# Patient Record
Sex: FEMALE | Race: WHITE | NOT HISPANIC OR LATINO | Employment: OTHER | URBAN - METROPOLITAN AREA
[De-identification: names, ages, dates, MRNs, and addresses within clinical notes are randomized per-mention and may not be internally consistent; named-entity substitution may affect disease eponyms.]

---

## 2017-12-08 ENCOUNTER — PATIENT OUTREACH (OUTPATIENT)
Dept: HEALTH INFORMATION MANAGEMENT | Facility: OTHER | Age: 72
End: 2017-12-08

## 2017-12-08 NOTE — PROGRESS NOTES
Outcome: NO ANSWER    Please transfer to Patient Outreach Team at [phone] when patient returns call.    [facility] Verified: yes    Attempt # 1

## 2017-12-20 NOTE — PROGRESS NOTES
Outcome: no answer-no vm  Please transfer to Patient Outreach Team at [phone] when patient returns call.        Attempt # 2

## 2018-10-19 ENCOUNTER — PATIENT OUTREACH (OUTPATIENT)
Dept: HEALTH INFORMATION MANAGEMENT | Facility: OTHER | Age: 73
End: 2018-10-19

## 2018-10-19 NOTE — PROGRESS NOTES
Outcome: no answer    Please transfer to Patient Outreach Team at [phone] when patient returns call.    [employer] Checked & Epic Updated:  yes    [facility] Verified: yes    Attempt # 1

## 2018-11-02 NOTE — PROGRESS NOTES
Outcome: no answer    Please transfer to Patient Outreach Team at [phone] when patient returns call.        Attempt # 2

## 2018-11-08 NOTE — PROGRESS NOTES
Outcome: Left Message    Please transfer to Patient Outreach Team at [phone] when patient returns call.      Attempt # 3

## 2021-01-11 DIAGNOSIS — Z23 NEED FOR VACCINATION: ICD-10-CM

## 2021-01-21 ENCOUNTER — IMMUNIZATION (OUTPATIENT)
Dept: FAMILY PLANNING/WOMEN'S HEALTH CLINIC | Facility: IMMUNIZATION CENTER | Age: 76
End: 2021-01-21
Attending: INTERNAL MEDICINE
Payer: MEDICARE

## 2021-01-21 DIAGNOSIS — Z23 NEED FOR VACCINATION: ICD-10-CM

## 2021-01-21 DIAGNOSIS — Z23 ENCOUNTER FOR VACCINATION: Primary | ICD-10-CM

## 2021-01-21 PROCEDURE — 0001A PFIZER SARS-COV-2 VACCINE: CPT

## 2021-01-21 PROCEDURE — 91300 PFIZER SARS-COV-2 VACCINE: CPT

## 2021-02-11 ENCOUNTER — IMMUNIZATION (OUTPATIENT)
Dept: FAMILY PLANNING/WOMEN'S HEALTH CLINIC | Facility: IMMUNIZATION CENTER | Age: 76
End: 2021-02-11
Attending: INTERNAL MEDICINE
Payer: MEDICARE

## 2021-02-11 DIAGNOSIS — Z23 ENCOUNTER FOR VACCINATION: Primary | ICD-10-CM

## 2021-02-11 PROCEDURE — 0002A PFIZER SARS-COV-2 VACCINE: CPT | Performed by: NURSE PRACTITIONER

## 2021-02-11 PROCEDURE — 91300 PFIZER SARS-COV-2 VACCINE: CPT | Performed by: NURSE PRACTITIONER

## 2021-08-26 ENCOUNTER — PATIENT MESSAGE (OUTPATIENT)
Dept: HEALTH INFORMATION MANAGEMENT | Facility: OTHER | Age: 76
End: 2021-08-26

## 2021-09-24 ENCOUNTER — TELEPHONE (OUTPATIENT)
Dept: HEALTH INFORMATION MANAGEMENT | Facility: OTHER | Age: 76
End: 2021-09-24

## 2021-09-24 NOTE — TELEPHONE ENCOUNTER
Outcome: Left Message to schedule Comprehensive Health Assessment.     Please transfer to Patient Outreach Team at [phone] when patient returns call.    [facility] Verified: yes    Attempt # 2

## 2022-01-01 ENCOUNTER — HOME CARE VISIT (OUTPATIENT)
Dept: HOSPICE | Facility: HOSPICE | Age: 77
End: 2022-01-01
Payer: MEDICARE

## 2022-01-01 ENCOUNTER — HOME CARE VISIT (OUTPATIENT)
Dept: HOME HEALTH SERVICES | Facility: HOME HEALTHCARE | Age: 77
End: 2022-01-01
Payer: MEDICARE

## 2022-01-01 ENCOUNTER — HOSPITAL ENCOUNTER (EMERGENCY)
Facility: MEDICAL CENTER | Age: 77
End: 2022-10-13
Attending: EMERGENCY MEDICINE
Payer: MEDICARE

## 2022-01-01 ENCOUNTER — HOSPITAL ENCOUNTER (OUTPATIENT)
Facility: MEDICAL CENTER | Age: 77
DRG: 640 | End: 2022-09-17
Attending: STUDENT IN AN ORGANIZED HEALTH CARE EDUCATION/TRAINING PROGRAM | Admitting: INTERNAL MEDICINE
Payer: MEDICARE

## 2022-01-01 ENCOUNTER — HOSPITAL ENCOUNTER (OUTPATIENT)
Dept: LAB | Facility: MEDICAL CENTER | Age: 77
End: 2022-09-07
Attending: STUDENT IN AN ORGANIZED HEALTH CARE EDUCATION/TRAINING PROGRAM
Payer: MEDICARE

## 2022-01-01 ENCOUNTER — APPOINTMENT (OUTPATIENT)
Dept: RADIOLOGY | Facility: MEDICAL CENTER | Age: 77
DRG: 643 | End: 2022-01-01
Attending: HOSPITALIST
Payer: MEDICARE

## 2022-01-01 ENCOUNTER — APPOINTMENT (OUTPATIENT)
Dept: RADIOLOGY | Facility: MEDICAL CENTER | Age: 77
DRG: 640 | End: 2022-01-01
Attending: HOSPITALIST
Payer: MEDICARE

## 2022-01-01 ENCOUNTER — OFFICE VISIT (OUTPATIENT)
Dept: MEDICAL GROUP | Facility: MEDICAL CENTER | Age: 77
End: 2022-01-01
Payer: MEDICARE

## 2022-01-01 ENCOUNTER — HOME HEALTH ADMISSION (OUTPATIENT)
Dept: HOME HEALTH SERVICES | Facility: HOME HEALTHCARE | Age: 77
End: 2022-01-01
Payer: MEDICARE

## 2022-01-01 ENCOUNTER — HOSPITAL ENCOUNTER (INPATIENT)
Facility: MEDICAL CENTER | Age: 77
LOS: 2 days | DRG: 643 | End: 2022-09-25
Attending: EMERGENCY MEDICINE | Admitting: INTERNAL MEDICINE
Payer: MEDICARE

## 2022-01-01 ENCOUNTER — TELEPHONE (OUTPATIENT)
Dept: CARDIOLOGY | Facility: MEDICAL CENTER | Age: 77
End: 2022-01-01

## 2022-01-01 ENCOUNTER — APPOINTMENT (OUTPATIENT)
Dept: RADIOLOGY | Facility: MEDICAL CENTER | Age: 77
DRG: 988 | End: 2022-01-01
Attending: INTERNAL MEDICINE
Payer: MEDICARE

## 2022-01-01 ENCOUNTER — APPOINTMENT (OUTPATIENT)
Dept: RADIOLOGY | Facility: MEDICAL CENTER | Age: 77
DRG: 988 | End: 2022-01-01
Attending: HOSPITALIST
Payer: MEDICARE

## 2022-01-01 ENCOUNTER — APPOINTMENT (OUTPATIENT)
Dept: RADIOLOGY | Facility: MEDICAL CENTER | Age: 77
DRG: 641 | End: 2022-01-01
Attending: EMERGENCY MEDICINE
Payer: MEDICARE

## 2022-01-01 ENCOUNTER — HOSPITAL ENCOUNTER (INPATIENT)
Facility: MEDICAL CENTER | Age: 77
LOS: 2 days | DRG: 641 | End: 2022-09-02
Attending: EMERGENCY MEDICINE | Admitting: HOSPITALIST
Payer: MEDICARE

## 2022-01-01 ENCOUNTER — APPOINTMENT (OUTPATIENT)
Dept: RADIOLOGY | Facility: MEDICAL CENTER | Age: 77
End: 2022-01-01
Attending: STUDENT IN AN ORGANIZED HEALTH CARE EDUCATION/TRAINING PROGRAM
Payer: MEDICARE

## 2022-01-01 ENCOUNTER — APPOINTMENT (OUTPATIENT)
Dept: RADIOLOGY | Facility: MEDICAL CENTER | Age: 77
End: 2022-01-01
Attending: EMERGENCY MEDICINE
Payer: MEDICARE

## 2022-01-01 ENCOUNTER — OFFICE VISIT (OUTPATIENT)
Dept: CARDIOLOGY | Facility: MEDICAL CENTER | Age: 77
End: 2022-01-01
Attending: INTERNAL MEDICINE
Payer: MEDICARE

## 2022-01-01 ENCOUNTER — HOSPITAL ENCOUNTER (INPATIENT)
Facility: MEDICAL CENTER | Age: 77
LOS: 11 days | DRG: 988 | End: 2022-10-06
Attending: HOSPITALIST | Admitting: HOSPITALIST
Payer: MEDICARE

## 2022-01-01 ENCOUNTER — HOSPITAL ENCOUNTER (OUTPATIENT)
Dept: LAB | Facility: MEDICAL CENTER | Age: 77
DRG: 640 | End: 2022-09-16
Attending: STUDENT IN AN ORGANIZED HEALTH CARE EDUCATION/TRAINING PROGRAM
Payer: MEDICARE

## 2022-01-01 ENCOUNTER — HOSPITAL ENCOUNTER (OUTPATIENT)
Facility: MEDICAL CENTER | Age: 77
End: 2022-12-20
Attending: EMERGENCY MEDICINE | Admitting: HOSPITALIST
Payer: MEDICARE

## 2022-01-01 ENCOUNTER — DOCUMENTATION (OUTPATIENT)
Dept: MEDICAL GROUP | Facility: PHYSICIAN GROUP | Age: 77
End: 2022-01-01
Payer: MEDICARE

## 2022-01-01 ENCOUNTER — PHARMACY VISIT (OUTPATIENT)
Dept: PHARMACY | Facility: MEDICAL CENTER | Age: 77
End: 2022-01-01
Payer: MEDICARE

## 2022-01-01 ENCOUNTER — HOSPICE ADMISSION (OUTPATIENT)
Dept: HOSPICE | Facility: HOSPICE | Age: 77
End: 2022-01-01
Payer: MEDICARE

## 2022-01-01 ENCOUNTER — APPOINTMENT (OUTPATIENT)
Dept: CARDIOLOGY | Facility: MEDICAL CENTER | Age: 77
DRG: 641 | End: 2022-01-01
Attending: HOSPITALIST
Payer: MEDICARE

## 2022-01-01 ENCOUNTER — APPOINTMENT (OUTPATIENT)
Dept: RADIOLOGY | Facility: MEDICAL CENTER | Age: 77
DRG: 643 | End: 2022-01-01
Attending: EMERGENCY MEDICINE
Payer: MEDICARE

## 2022-01-01 ENCOUNTER — APPOINTMENT (OUTPATIENT)
Dept: RADIOLOGY | Facility: MEDICAL CENTER | Age: 77
DRG: 640 | End: 2022-01-01
Attending: INTERNAL MEDICINE
Payer: MEDICARE

## 2022-01-01 ENCOUNTER — PATIENT OUTREACH (OUTPATIENT)
Dept: MEDICAL GROUP | Facility: MEDICAL CENTER | Age: 77
End: 2022-01-01

## 2022-01-01 ENCOUNTER — APPOINTMENT (OUTPATIENT)
Dept: RADIOLOGY | Facility: MEDICAL CENTER | Age: 77
DRG: 640 | End: 2022-01-01
Attending: FAMILY MEDICINE
Payer: MEDICARE

## 2022-01-01 ENCOUNTER — APPOINTMENT (OUTPATIENT)
Dept: RADIOLOGY | Facility: MEDICAL CENTER | Age: 77
DRG: 643 | End: 2022-01-01
Payer: MEDICARE

## 2022-01-01 ENCOUNTER — TELEPHONE (OUTPATIENT)
Dept: HEALTH INFORMATION MANAGEMENT | Facility: OTHER | Age: 77
End: 2022-01-01

## 2022-01-01 ENCOUNTER — APPOINTMENT (OUTPATIENT)
Dept: NEPHROLOGY | Facility: MEDICAL CENTER | Age: 77
End: 2022-01-01
Payer: MEDICARE

## 2022-01-01 ENCOUNTER — HOSPITAL ENCOUNTER (INPATIENT)
Facility: MEDICAL CENTER | Age: 77
LOS: 2 days | DRG: 640 | End: 2022-09-20
Attending: EMERGENCY MEDICINE | Admitting: INTERNAL MEDICINE
Payer: MEDICARE

## 2022-01-01 ENCOUNTER — APPOINTMENT (OUTPATIENT)
Dept: MEDICAL GROUP | Facility: MEDICAL CENTER | Age: 77
End: 2022-01-01
Payer: MEDICARE

## 2022-01-01 ENCOUNTER — HOME CARE VISIT (OUTPATIENT)
Dept: HOSPICE | Facility: HOSPICE | Age: 77
End: 2022-01-01

## 2022-01-01 ENCOUNTER — DOCUMENTATION (OUTPATIENT)
Dept: MEDICAL GROUP | Facility: MEDICAL CENTER | Age: 77
End: 2022-01-01

## 2022-01-01 ENCOUNTER — PHARMACY VISIT (OUTPATIENT)
Dept: PHARMACY | Facility: MEDICAL CENTER | Age: 77
End: 2022-01-01
Payer: COMMERCIAL

## 2022-01-01 ENCOUNTER — DOCUMENTATION (OUTPATIENT)
Dept: HEALTH INFORMATION MANAGEMENT | Facility: OTHER | Age: 77
End: 2022-01-01
Payer: MEDICARE

## 2022-01-01 ENCOUNTER — TELEPHONE (OUTPATIENT)
Dept: MEDICAL GROUP | Facility: MEDICAL CENTER | Age: 77
End: 2022-01-01

## 2022-01-01 ENCOUNTER — APPOINTMENT (OUTPATIENT)
Dept: RADIOLOGY | Facility: MEDICAL CENTER | Age: 77
DRG: 643 | End: 2022-01-01
Attending: INTERNAL MEDICINE
Payer: MEDICARE

## 2022-01-01 ENCOUNTER — PATIENT MESSAGE (OUTPATIENT)
Dept: HEALTH INFORMATION MANAGEMENT | Facility: OTHER | Age: 77
End: 2022-01-01

## 2022-01-01 ENCOUNTER — APPOINTMENT (OUTPATIENT)
Dept: RADIOLOGY | Facility: MEDICAL CENTER | Age: 77
DRG: 640 | End: 2022-01-01
Attending: EMERGENCY MEDICINE
Payer: MEDICARE

## 2022-01-01 ENCOUNTER — TELEPHONE (OUTPATIENT)
Dept: MEDICAL GROUP | Facility: MEDICAL CENTER | Age: 77
End: 2022-01-01
Payer: MEDICARE

## 2022-01-01 VITALS
WEIGHT: 171 LBS | TEMPERATURE: 98 F | HEIGHT: 66 IN | HEART RATE: 74 BPM | BODY MASS INDEX: 27.48 KG/M2 | OXYGEN SATURATION: 95 % | DIASTOLIC BLOOD PRESSURE: 78 MMHG | SYSTOLIC BLOOD PRESSURE: 126 MMHG

## 2022-01-01 VITALS — SYSTOLIC BLOOD PRESSURE: 125 MMHG | DIASTOLIC BLOOD PRESSURE: 73 MMHG | RESPIRATION RATE: 20 BRPM | HEART RATE: 102 BPM

## 2022-01-01 VITALS
OXYGEN SATURATION: 95 % | HEIGHT: 66 IN | BODY MASS INDEX: 26.52 KG/M2 | RESPIRATION RATE: 16 BRPM | TEMPERATURE: 97.6 F | DIASTOLIC BLOOD PRESSURE: 72 MMHG | HEART RATE: 75 BPM | SYSTOLIC BLOOD PRESSURE: 110 MMHG | WEIGHT: 165 LBS

## 2022-01-01 VITALS
RESPIRATION RATE: 20 BRPM | HEART RATE: 86 BPM | OXYGEN SATURATION: 94 % | DIASTOLIC BLOOD PRESSURE: 80 MMHG | SYSTOLIC BLOOD PRESSURE: 140 MMHG | TEMPERATURE: 98.6 F

## 2022-01-01 VITALS
HEIGHT: 66 IN | BODY MASS INDEX: 29.89 KG/M2 | DIASTOLIC BLOOD PRESSURE: 90 MMHG | HEART RATE: 86 BPM | RESPIRATION RATE: 16 BRPM | WEIGHT: 186 LBS | SYSTOLIC BLOOD PRESSURE: 140 MMHG | OXYGEN SATURATION: 96 %

## 2022-01-01 VITALS
DIASTOLIC BLOOD PRESSURE: 64 MMHG | HEART RATE: 74 BPM | RESPIRATION RATE: 18 BRPM | BODY MASS INDEX: 29.36 KG/M2 | HEIGHT: 64 IN | SYSTOLIC BLOOD PRESSURE: 139 MMHG | TEMPERATURE: 98.5 F | OXYGEN SATURATION: 99 % | WEIGHT: 171.96 LBS

## 2022-01-01 VITALS
RESPIRATION RATE: 16 BRPM | BODY MASS INDEX: 29.25 KG/M2 | DIASTOLIC BLOOD PRESSURE: 55 MMHG | OXYGEN SATURATION: 90 % | WEIGHT: 181.22 LBS | HEART RATE: 85 BPM | TEMPERATURE: 98.3 F | SYSTOLIC BLOOD PRESSURE: 102 MMHG

## 2022-01-01 VITALS
TEMPERATURE: 98.5 F | OXYGEN SATURATION: 96 % | DIASTOLIC BLOOD PRESSURE: 88 MMHG | HEART RATE: 89 BPM | RESPIRATION RATE: 16 BRPM | SYSTOLIC BLOOD PRESSURE: 142 MMHG

## 2022-01-01 VITALS
DIASTOLIC BLOOD PRESSURE: 66 MMHG | SYSTOLIC BLOOD PRESSURE: 120 MMHG | BODY MASS INDEX: 27.81 KG/M2 | TEMPERATURE: 96.6 F | OXYGEN SATURATION: 95 % | HEIGHT: 64 IN | HEART RATE: 87 BPM

## 2022-01-01 VITALS
RESPIRATION RATE: 18 BRPM | DIASTOLIC BLOOD PRESSURE: 80 MMHG | TEMPERATURE: 98.7 F | HEART RATE: 88 BPM | SYSTOLIC BLOOD PRESSURE: 120 MMHG | OXYGEN SATURATION: 93 %

## 2022-01-01 VITALS — RESPIRATION RATE: 20 BRPM | DIASTOLIC BLOOD PRESSURE: 92 MMHG | HEART RATE: 78 BPM | SYSTOLIC BLOOD PRESSURE: 144 MMHG

## 2022-01-01 VITALS
DIASTOLIC BLOOD PRESSURE: 78 MMHG | OXYGEN SATURATION: 93 % | SYSTOLIC BLOOD PRESSURE: 141 MMHG | HEIGHT: 66 IN | HEART RATE: 87 BPM | TEMPERATURE: 98.3 F | BODY MASS INDEX: 28.17 KG/M2 | WEIGHT: 175.27 LBS | RESPIRATION RATE: 16 BRPM

## 2022-01-01 VITALS — DIASTOLIC BLOOD PRESSURE: 76 MMHG | SYSTOLIC BLOOD PRESSURE: 134 MMHG | HEART RATE: 84 BPM | RESPIRATION RATE: 18 BRPM

## 2022-01-01 VITALS
TEMPERATURE: 98.6 F | RESPIRATION RATE: 18 BRPM | OXYGEN SATURATION: 94 % | SYSTOLIC BLOOD PRESSURE: 140 MMHG | HEART RATE: 78 BPM | DIASTOLIC BLOOD PRESSURE: 80 MMHG

## 2022-01-01 VITALS — DIASTOLIC BLOOD PRESSURE: 52 MMHG | HEART RATE: 88 BPM | RESPIRATION RATE: 14 BRPM | SYSTOLIC BLOOD PRESSURE: 96 MMHG

## 2022-01-01 VITALS
RESPIRATION RATE: 18 BRPM | HEART RATE: 88 BPM | TEMPERATURE: 97.6 F | DIASTOLIC BLOOD PRESSURE: 67 MMHG | HEIGHT: 66 IN | OXYGEN SATURATION: 93 % | WEIGHT: 186.73 LBS | BODY MASS INDEX: 30.01 KG/M2 | SYSTOLIC BLOOD PRESSURE: 139 MMHG

## 2022-01-01 VITALS
WEIGHT: 190.92 LBS | BODY MASS INDEX: 30.68 KG/M2 | DIASTOLIC BLOOD PRESSURE: 72 MMHG | RESPIRATION RATE: 18 BRPM | HEIGHT: 66 IN | HEART RATE: 83 BPM | OXYGEN SATURATION: 95 % | TEMPERATURE: 97.5 F | SYSTOLIC BLOOD PRESSURE: 142 MMHG

## 2022-01-01 VITALS — RESPIRATION RATE: 18 BRPM | SYSTOLIC BLOOD PRESSURE: 122 MMHG | HEART RATE: 93 BPM | DIASTOLIC BLOOD PRESSURE: 70 MMHG

## 2022-01-01 VITALS
TEMPERATURE: 97.2 F | HEIGHT: 64 IN | SYSTOLIC BLOOD PRESSURE: 146 MMHG | RESPIRATION RATE: 19 BRPM | WEIGHT: 162 LBS | BODY MASS INDEX: 27.66 KG/M2 | OXYGEN SATURATION: 94 % | HEART RATE: 82 BPM | DIASTOLIC BLOOD PRESSURE: 75 MMHG

## 2022-01-01 VITALS — RESPIRATION RATE: 20 BRPM | HEART RATE: 84 BPM | SYSTOLIC BLOOD PRESSURE: 120 MMHG | DIASTOLIC BLOOD PRESSURE: 60 MMHG

## 2022-01-01 VITALS
TEMPERATURE: 96.9 F | HEART RATE: 83 BPM | WEIGHT: 180 LBS | OXYGEN SATURATION: 96 % | SYSTOLIC BLOOD PRESSURE: 110 MMHG | HEIGHT: 66 IN | BODY MASS INDEX: 28.93 KG/M2 | DIASTOLIC BLOOD PRESSURE: 84 MMHG

## 2022-01-01 VITALS — RESPIRATION RATE: 20 BRPM

## 2022-01-01 VITALS — HEART RATE: 72 BPM | SYSTOLIC BLOOD PRESSURE: 158 MMHG | RESPIRATION RATE: 20 BRPM | DIASTOLIC BLOOD PRESSURE: 94 MMHG

## 2022-01-01 VITALS
RESPIRATION RATE: 18 BRPM | SYSTOLIC BLOOD PRESSURE: 110 MMHG | DIASTOLIC BLOOD PRESSURE: 60 MMHG | OXYGEN SATURATION: 95 % | TEMPERATURE: 98.5 F | HEART RATE: 69 BPM

## 2022-01-01 VITALS — RESPIRATION RATE: 16 BRPM

## 2022-01-01 DIAGNOSIS — Z00.00 HEALTHCARE MAINTENANCE: ICD-10-CM

## 2022-01-01 DIAGNOSIS — N18.6 ESRD (END STAGE RENAL DISEASE) (HCC): ICD-10-CM

## 2022-01-01 DIAGNOSIS — F51.01 PRIMARY INSOMNIA: ICD-10-CM

## 2022-01-01 DIAGNOSIS — R06.02 SHORTNESS OF BREATH: ICD-10-CM

## 2022-01-01 DIAGNOSIS — N85.8 UTERINE MASS: ICD-10-CM

## 2022-01-01 DIAGNOSIS — I50.9 ACUTE CONGESTIVE HEART FAILURE, UNSPECIFIED HEART FAILURE TYPE (HCC): ICD-10-CM

## 2022-01-01 DIAGNOSIS — I50.9 CONGESTIVE HEART FAILURE, UNSPECIFIED HF CHRONICITY, UNSPECIFIED HEART FAILURE TYPE (HCC): ICD-10-CM

## 2022-01-01 DIAGNOSIS — F41.9 ANXIETY: ICD-10-CM

## 2022-01-01 DIAGNOSIS — M79.89 LEG SWELLING: ICD-10-CM

## 2022-01-01 DIAGNOSIS — I48.0 PAROXYSMAL ATRIAL FIBRILLATION (HCC): ICD-10-CM

## 2022-01-01 DIAGNOSIS — I10 HYPERTENSION, UNSPECIFIED TYPE: ICD-10-CM

## 2022-01-01 DIAGNOSIS — N94.89 ENDOMETRIAL MASS: ICD-10-CM

## 2022-01-01 DIAGNOSIS — N18.4 ACUTE RENAL FAILURE SUPERIMPOSED ON STAGE 4 CHRONIC KIDNEY DISEASE, UNSPECIFIED ACUTE RENAL FAILURE TYPE (HCC): ICD-10-CM

## 2022-01-01 DIAGNOSIS — E55.9 VITAMIN D DEFICIENCY: ICD-10-CM

## 2022-01-01 DIAGNOSIS — E83.51 HYPOCALCEMIA: ICD-10-CM

## 2022-01-01 DIAGNOSIS — N17.9 AKI (ACUTE KIDNEY INJURY) (HCC): ICD-10-CM

## 2022-01-01 DIAGNOSIS — I48.91 NEW ONSET ATRIAL FIBRILLATION (HCC): ICD-10-CM

## 2022-01-01 DIAGNOSIS — R06.83 SNORING: ICD-10-CM

## 2022-01-01 DIAGNOSIS — R79.89 ELEVATED BRAIN NATRIURETIC PEPTIDE (BNP) LEVEL: ICD-10-CM

## 2022-01-01 DIAGNOSIS — R52 PAIN ASSOCIATED WITH TERMINAL ILLNESS: ICD-10-CM

## 2022-01-01 DIAGNOSIS — C54.9 MALIGNANT NEOPLASM OF BODY OF UTERUS, UNSPECIFIED SITE (HCC): ICD-10-CM

## 2022-01-01 DIAGNOSIS — J90 PLEURAL EFFUSION: ICD-10-CM

## 2022-01-01 DIAGNOSIS — N25.81 HYPERPARATHYROIDISM, SECONDARY (HCC): ICD-10-CM

## 2022-01-01 DIAGNOSIS — N17.8 ACUTE RENAL FAILURE WITH OTHER SPECIFIED PATHOLOGICAL KIDNEY LESION SUPERIMPOSED ON STAGE 4 CHRONIC KIDNEY DISEASE (HCC): ICD-10-CM

## 2022-01-01 DIAGNOSIS — C79.9 METASTATIC MALIGNANT NEOPLASM, UNSPECIFIED SITE (HCC): ICD-10-CM

## 2022-01-01 DIAGNOSIS — G93.40 ENCEPHALOPATHY ACUTE: ICD-10-CM

## 2022-01-01 DIAGNOSIS — N30.01 ACUTE CYSTITIS WITH HEMATURIA: ICD-10-CM

## 2022-01-01 DIAGNOSIS — N18.4 STAGE 4 CHRONIC KIDNEY DISEASE (HCC): ICD-10-CM

## 2022-01-01 DIAGNOSIS — D63.8 ANEMIA OF CHRONIC DISEASE: ICD-10-CM

## 2022-01-01 DIAGNOSIS — N18.5 CKD (CHRONIC KIDNEY DISEASE) STAGE 5, GFR LESS THAN 15 ML/MIN (HCC): ICD-10-CM

## 2022-01-01 DIAGNOSIS — Z09 HOSPITAL DISCHARGE FOLLOW-UP: ICD-10-CM

## 2022-01-01 DIAGNOSIS — E87.70 HYPERVOLEMIA, UNSPECIFIED HYPERVOLEMIA TYPE: ICD-10-CM

## 2022-01-01 DIAGNOSIS — R79.89 ELEVATED TROPONIN: ICD-10-CM

## 2022-01-01 DIAGNOSIS — N18.4 ACUTE RENAL FAILURE WITH OTHER SPECIFIED PATHOLOGICAL KIDNEY LESION SUPERIMPOSED ON STAGE 4 CHRONIC KIDNEY DISEASE (HCC): ICD-10-CM

## 2022-01-01 DIAGNOSIS — E87.1 HYPONATREMIA: ICD-10-CM

## 2022-01-01 DIAGNOSIS — R79.9 ABNORMAL FINDING OF BLOOD CHEMISTRY, UNSPECIFIED: ICD-10-CM

## 2022-01-01 DIAGNOSIS — R06.02 SOB (SHORTNESS OF BREATH): ICD-10-CM

## 2022-01-01 DIAGNOSIS — N18.6 END STAGE RENAL DISEASE (HCC): ICD-10-CM

## 2022-01-01 DIAGNOSIS — R42 LIGHTHEADEDNESS: ICD-10-CM

## 2022-01-01 DIAGNOSIS — I62.9 INTRACRANIAL HEMORRHAGE (HCC): ICD-10-CM

## 2022-01-01 DIAGNOSIS — N17.9 ACUTE RENAL FAILURE SUPERIMPOSED ON STAGE 4 CHRONIC KIDNEY DISEASE, UNSPECIFIED ACUTE RENAL FAILURE TYPE (HCC): ICD-10-CM

## 2022-01-01 DIAGNOSIS — N30.90 CYSTITIS: ICD-10-CM

## 2022-01-01 DIAGNOSIS — D69.6 THROMBOCYTOPENIA (HCC): ICD-10-CM

## 2022-01-01 LAB
25(OH)D3 SERPL-MCNC: 6 NG/ML (ref 30–100)
ABO + RH BLD: NORMAL
ABO GROUP BLD: NORMAL
ALBUMIN SERPL BCP-MCNC: 1.4 G/DL (ref 3.2–4.9)
ALBUMIN SERPL BCP-MCNC: 1.5 G/DL (ref 3.2–4.9)
ALBUMIN SERPL BCP-MCNC: 1.6 G/DL (ref 3.2–4.9)
ALBUMIN SERPL BCP-MCNC: 1.6 G/DL (ref 3.2–4.9)
ALBUMIN SERPL BCP-MCNC: 1.8 G/DL (ref 3.2–4.9)
ALBUMIN SERPL BCP-MCNC: 1.9 G/DL (ref 3.2–4.9)
ALBUMIN SERPL BCP-MCNC: 1.9 G/DL (ref 3.2–4.9)
ALBUMIN SERPL BCP-MCNC: 2 G/DL (ref 3.2–4.9)
ALBUMIN SERPL BCP-MCNC: 2.1 G/DL (ref 3.2–4.9)
ALBUMIN SERPL BCP-MCNC: 2.3 G/DL (ref 3.2–4.9)
ALBUMIN SERPL ELPH-MCNC: 2.16 G/DL (ref 3.75–5.01)
ALBUMIN/GLOB SERPL: 0.5 G/DL
ALBUMIN/GLOB SERPL: 0.6 G/DL
ALBUMIN/GLOB SERPL: 0.7 G/DL
ALBUMIN/GLOB SERPL: 0.8 G/DL
ALBUMIN/GLOB SERPL: ABNORMAL G/DL
ALP SERPL-CCNC: 63 U/L (ref 30–99)
ALP SERPL-CCNC: 63 U/L (ref 30–99)
ALP SERPL-CCNC: 66 U/L (ref 30–99)
ALP SERPL-CCNC: 66 U/L (ref 30–99)
ALP SERPL-CCNC: 69 U/L (ref 30–99)
ALP SERPL-CCNC: 71 U/L (ref 30–99)
ALP SERPL-CCNC: 76 U/L (ref 30–99)
ALP SERPL-CCNC: 76 U/L (ref 30–99)
ALP SERPL-CCNC: 77 U/L (ref 30–99)
ALP SERPL-CCNC: 80 U/L (ref 30–99)
ALP SERPL-CCNC: 81 U/L (ref 30–99)
ALP SERPL-CCNC: 91 U/L (ref 30–99)
ALP SERPL-CCNC: 99 U/L (ref 30–99)
ALPHA1 GLOB SERPL ELPH-MCNC: 0.35 G/DL (ref 0.19–0.46)
ALPHA2 GLOB SERPL ELPH-MCNC: 0.84 G/DL (ref 0.48–1.05)
ALT SERPL-CCNC: 10 U/L (ref 2–50)
ALT SERPL-CCNC: 11 U/L (ref 2–50)
ALT SERPL-CCNC: 11 U/L (ref 2–50)
ALT SERPL-CCNC: 12 U/L (ref 2–50)
ALT SERPL-CCNC: 13 U/L (ref 2–50)
ALT SERPL-CCNC: 14 U/L (ref 2–50)
ALT SERPL-CCNC: 15 U/L (ref 2–50)
ALT SERPL-CCNC: 9 U/L (ref 2–50)
ALT SERPL-CCNC: 9 U/L (ref 2–50)
AMORPH CRY #/AREA URNS HPF: PRESENT /HPF
ANA INTERPRETIVE COMMENT Q5143: ABNORMAL
ANA PATTERN Q5144: ABNORMAL
ANA TITER Q5145: ABNORMAL
ANION GAP SERPL CALC-SCNC: 10 MMOL/L (ref 7–16)
ANION GAP SERPL CALC-SCNC: 11 MMOL/L (ref 7–16)
ANION GAP SERPL CALC-SCNC: 11 MMOL/L (ref 7–16)
ANION GAP SERPL CALC-SCNC: 12 MMOL/L (ref 7–16)
ANION GAP SERPL CALC-SCNC: 13 MMOL/L (ref 7–16)
ANION GAP SERPL CALC-SCNC: 13 MMOL/L (ref 7–16)
ANION GAP SERPL CALC-SCNC: 14 MMOL/L (ref 7–16)
ANION GAP SERPL CALC-SCNC: 3 MMOL/L (ref 7–16)
ANION GAP SERPL CALC-SCNC: 5 MMOL/L (ref 7–16)
ANION GAP SERPL CALC-SCNC: 6 MMOL/L (ref 7–16)
ANION GAP SERPL CALC-SCNC: 6 MMOL/L (ref 7–16)
ANION GAP SERPL CALC-SCNC: 7 MMOL/L (ref 7–16)
ANION GAP SERPL CALC-SCNC: 7 MMOL/L (ref 7–16)
ANION GAP SERPL CALC-SCNC: 8 MMOL/L (ref 7–16)
ANION GAP SERPL CALC-SCNC: 8 MMOL/L (ref 7–16)
ANION GAP SERPL CALC-SCNC: 9 MMOL/L (ref 7–16)
ANTINUCLEAR ANTIBODY (ANA), HEP-2, IGG Q5142: DETECTED
APPEARANCE UR: ABNORMAL
APPEARANCE UR: CLEAR
APPEARANCE UR: CLEAR
APTT PPP: 27.5 SEC (ref 24.7–36)
AST SERPL-CCNC: 20 U/L (ref 12–45)
AST SERPL-CCNC: 21 U/L (ref 12–45)
AST SERPL-CCNC: 22 U/L (ref 12–45)
AST SERPL-CCNC: 22 U/L (ref 12–45)
AST SERPL-CCNC: 23 U/L (ref 12–45)
AST SERPL-CCNC: 24 U/L (ref 12–45)
AST SERPL-CCNC: 24 U/L (ref 12–45)
B-GLOBULIN SERPL ELPH-MCNC: 0.62 G/DL (ref 0.48–1.1)
BACTERIA #/AREA URNS HPF: ABNORMAL /HPF
BACTERIA #/AREA URNS HPF: NEGATIVE /HPF
BACTERIA BLD CULT: NORMAL
BACTERIA BLD CULT: NORMAL
BACTERIA UR CULT: ABNORMAL
BACTERIA UR CULT: ABNORMAL
BACTERIA UR CULT: NORMAL
BASOPHILS # BLD AUTO: 0.4 % (ref 0–1.8)
BASOPHILS # BLD AUTO: 0.5 % (ref 0–1.8)
BASOPHILS # BLD AUTO: 0.7 % (ref 0–1.8)
BASOPHILS # BLD AUTO: 0.9 % (ref 0–1.8)
BASOPHILS # BLD AUTO: 0.9 % (ref 0–1.8)
BASOPHILS # BLD AUTO: 1 % (ref 0–1.8)
BASOPHILS # BLD AUTO: 1.1 % (ref 0–1.8)
BASOPHILS # BLD AUTO: 1.2 % (ref 0–1.8)
BASOPHILS # BLD AUTO: 1.4 % (ref 0–1.8)
BASOPHILS # BLD: 0.04 K/UL (ref 0–0.12)
BASOPHILS # BLD: 0.05 K/UL (ref 0–0.12)
BASOPHILS # BLD: 0.06 K/UL (ref 0–0.12)
BASOPHILS # BLD: 0.07 K/UL (ref 0–0.12)
BASOPHILS # BLD: 0.08 K/UL (ref 0–0.12)
BILIRUB CONJ SERPL-MCNC: <0.2 MG/DL (ref 0.1–0.5)
BILIRUB INDIRECT SERPL-MCNC: ABNORMAL MG/DL (ref 0–1)
BILIRUB SERPL-MCNC: 0.2 MG/DL (ref 0.1–1.5)
BILIRUB SERPL-MCNC: 0.3 MG/DL (ref 0.1–1.5)
BILIRUB SERPL-MCNC: 0.3 MG/DL (ref 0.1–1.5)
BILIRUB SERPL-MCNC: <0.2 MG/DL (ref 0.1–1.5)
BILIRUB UR QL STRIP.AUTO: NEGATIVE
BLD GP AB SCN SERPL QL: NORMAL
BUN SERPL-MCNC: 11 MG/DL (ref 8–22)
BUN SERPL-MCNC: 14 MG/DL (ref 8–22)
BUN SERPL-MCNC: 15 MG/DL (ref 8–22)
BUN SERPL-MCNC: 18 MG/DL (ref 8–22)
BUN SERPL-MCNC: 18 MG/DL (ref 8–22)
BUN SERPL-MCNC: 19 MG/DL (ref 8–22)
BUN SERPL-MCNC: 22 MG/DL (ref 8–22)
BUN SERPL-MCNC: 36 MG/DL (ref 8–22)
BUN SERPL-MCNC: 37 MG/DL (ref 8–22)
BUN SERPL-MCNC: 38 MG/DL (ref 8–22)
BUN SERPL-MCNC: 39 MG/DL (ref 8–22)
BUN SERPL-MCNC: 48 MG/DL (ref 8–22)
BUN SERPL-MCNC: 48 MG/DL (ref 8–22)
BUN SERPL-MCNC: 49 MG/DL (ref 8–22)
BUN SERPL-MCNC: 49 MG/DL (ref 8–22)
BUN SERPL-MCNC: 50 MG/DL (ref 8–22)
BUN SERPL-MCNC: 52 MG/DL (ref 8–22)
BUN SERPL-MCNC: 53 MG/DL (ref 8–22)
BUN SERPL-MCNC: 54 MG/DL (ref 8–22)
BUN SERPL-MCNC: 54 MG/DL (ref 8–22)
BUN SERPL-MCNC: 55 MG/DL (ref 8–22)
BUN SERPL-MCNC: 57 MG/DL (ref 8–22)
BUN SERPL-MCNC: 58 MG/DL (ref 8–22)
BUN SERPL-MCNC: 64 MG/DL (ref 8–22)
BUN SERPL-MCNC: 64 MG/DL (ref 8–22)
BUN SERPL-MCNC: 66 MG/DL (ref 8–22)
BUN SERPL-MCNC: 9 MG/DL (ref 8–22)
C DIFF DNA SPEC QL NAA+PROBE: NEGATIVE
C DIFF TOX GENS STL QL NAA+PROBE: NEGATIVE
C3 SERPL-MCNC: 84.6 MG/DL (ref 87–200)
C4 SERPL-MCNC: 22.9 MG/DL (ref 19–52)
CA-I SERPL-SCNC: 1.02 MMOL/L (ref 1.1–1.3)
CA-I SERPL-SCNC: 1.02 MMOL/L (ref 1.1–1.3)
CALCIUM 24H UR-MCNC: <0.8 MG/DL
CALCIUM 24H UR-MRATE: ABNORMAL MG/D (ref 100–250)
CALCIUM ALBUM COR SERPL-MCNC: 9.6 MG/DL (ref 8.5–10.5)
CALCIUM SERPL-MCNC: 7 MG/DL (ref 8.5–10.5)
CALCIUM SERPL-MCNC: 7.1 MG/DL (ref 8.5–10.5)
CALCIUM SERPL-MCNC: 7.2 MG/DL (ref 8.4–10.2)
CALCIUM SERPL-MCNC: 7.2 MG/DL (ref 8.5–10.5)
CALCIUM SERPL-MCNC: 7.2 MG/DL (ref 8.5–10.5)
CALCIUM SERPL-MCNC: 7.3 MG/DL (ref 8.4–10.2)
CALCIUM SERPL-MCNC: 7.3 MG/DL (ref 8.4–10.2)
CALCIUM SERPL-MCNC: 7.3 MG/DL (ref 8.5–10.5)
CALCIUM SERPL-MCNC: 7.3 MG/DL (ref 8.5–10.5)
CALCIUM SERPL-MCNC: 7.4 MG/DL (ref 8.4–10.2)
CALCIUM SERPL-MCNC: 7.5 MG/DL (ref 8.5–10.5)
CALCIUM SERPL-MCNC: 7.6 MG/DL (ref 8.4–10.2)
CALCIUM SERPL-MCNC: 7.6 MG/DL (ref 8.4–10.2)
CALCIUM SERPL-MCNC: 7.7 MG/DL (ref 8.4–10.2)
CALCIUM SERPL-MCNC: 7.7 MG/DL (ref 8.4–10.2)
CALCIUM SERPL-MCNC: 7.8 MG/DL (ref 8.4–10.2)
CALCIUM SERPL-MCNC: 7.9 MG/DL (ref 8.4–10.2)
CALCIUM SERPL-MCNC: 8 MG/DL (ref 8.4–10.2)
CALCIUM SERPL-MCNC: 8.1 MG/DL (ref 8.5–10.5)
CALCIUM SERPL-MCNC: 8.2 MG/DL (ref 8.5–10.5)
CALCIUM/CREAT 24H UR: <8 MG/G (ref 20–300)
CANCER AG125 SERPL-ACNC: 301 U/ML (ref 0–35)
CHLORIDE SERPL-SCNC: 100 MMOL/L (ref 96–112)
CHLORIDE SERPL-SCNC: 103 MMOL/L (ref 96–112)
CHLORIDE SERPL-SCNC: 104 MMOL/L (ref 96–112)
CHLORIDE SERPL-SCNC: 87 MMOL/L (ref 96–112)
CHLORIDE SERPL-SCNC: 87 MMOL/L (ref 96–112)
CHLORIDE SERPL-SCNC: 88 MMOL/L (ref 96–112)
CHLORIDE SERPL-SCNC: 88 MMOL/L (ref 96–112)
CHLORIDE SERPL-SCNC: 89 MMOL/L (ref 96–112)
CHLORIDE SERPL-SCNC: 89 MMOL/L (ref 96–112)
CHLORIDE SERPL-SCNC: 90 MMOL/L (ref 96–112)
CHLORIDE SERPL-SCNC: 91 MMOL/L (ref 96–112)
CHLORIDE SERPL-SCNC: 92 MMOL/L (ref 96–112)
CHLORIDE SERPL-SCNC: 93 MMOL/L (ref 96–112)
CHLORIDE SERPL-SCNC: 94 MMOL/L (ref 96–112)
CHLORIDE SERPL-SCNC: 95 MMOL/L (ref 96–112)
CHLORIDE SERPL-SCNC: 96 MMOL/L (ref 96–112)
CHLORIDE SERPL-SCNC: 97 MMOL/L (ref 96–112)
CHLORIDE SERPL-SCNC: 98 MMOL/L (ref 96–112)
CHOLEST SERPL-MCNC: 358 MG/DL (ref 100–199)
CO2 SERPL-SCNC: 14 MMOL/L (ref 20–33)
CO2 SERPL-SCNC: 16 MMOL/L (ref 20–33)
CO2 SERPL-SCNC: 17 MMOL/L (ref 20–33)
CO2 SERPL-SCNC: 18 MMOL/L (ref 20–33)
CO2 SERPL-SCNC: 19 MMOL/L (ref 20–33)
CO2 SERPL-SCNC: 20 MMOL/L (ref 20–33)
CO2 SERPL-SCNC: 22 MMOL/L (ref 20–33)
CO2 SERPL-SCNC: 23 MMOL/L (ref 20–33)
CO2 SERPL-SCNC: 24 MMOL/L (ref 20–33)
CO2 SERPL-SCNC: 27 MMOL/L (ref 20–33)
CO2 SERPL-SCNC: 27 MMOL/L (ref 20–33)
CO2 SERPL-SCNC: 28 MMOL/L (ref 20–33)
CO2 SERPL-SCNC: 28 MMOL/L (ref 20–33)
CO2 SERPL-SCNC: 30 MMOL/L (ref 20–33)
CO2 SERPL-SCNC: 31 MMOL/L (ref 20–33)
COLLECT DURATION TIME SPEC: ABNORMAL HRS
COLOR UR: ABNORMAL
COLOR UR: ABNORMAL
COLOR UR: YELLOW
CORTIS SERPL-MCNC: 8.5 UG/DL (ref 0–23)
CREAT 24H UR-MCNC: 95 MG/DL
CREAT 24H UR-MRATE: ABNORMAL MG/D (ref 500–1400)
CREAT SERPL-MCNC: 1.77 MG/DL (ref 0.5–1.4)
CREAT SERPL-MCNC: 1.9 MG/DL (ref 0.5–1.4)
CREAT SERPL-MCNC: 2.27 MG/DL (ref 0.5–1.4)
CREAT SERPL-MCNC: 2.31 MG/DL (ref 0.5–1.4)
CREAT SERPL-MCNC: 2.33 MG/DL (ref 0.5–1.4)
CREAT SERPL-MCNC: 2.35 MG/DL (ref 0.5–1.4)
CREAT SERPL-MCNC: 2.39 MG/DL (ref 0.5–1.4)
CREAT SERPL-MCNC: 2.4 MG/DL (ref 0.5–1.4)
CREAT SERPL-MCNC: 2.42 MG/DL (ref 0.5–1.4)
CREAT SERPL-MCNC: 2.53 MG/DL (ref 0.5–1.4)
CREAT SERPL-MCNC: 2.56 MG/DL (ref 0.5–1.4)
CREAT SERPL-MCNC: 2.59 MG/DL (ref 0.5–1.4)
CREAT SERPL-MCNC: 2.68 MG/DL (ref 0.5–1.4)
CREAT SERPL-MCNC: 2.71 MG/DL (ref 0.5–1.4)
CREAT SERPL-MCNC: 2.73 MG/DL (ref 0.5–1.4)
CREAT SERPL-MCNC: 2.87 MG/DL (ref 0.5–1.4)
CREAT SERPL-MCNC: 3.13 MG/DL (ref 0.5–1.4)
CREAT SERPL-MCNC: 3.34 MG/DL (ref 0.5–1.4)
CREAT SERPL-MCNC: 3.53 MG/DL (ref 0.5–1.4)
CREAT SERPL-MCNC: 3.65 MG/DL (ref 0.5–1.4)
CREAT SERPL-MCNC: 3.7 MG/DL (ref 0.5–1.4)
CREAT SERPL-MCNC: 3.83 MG/DL (ref 0.5–1.4)
CREAT SERPL-MCNC: 3.93 MG/DL (ref 0.5–1.4)
CREAT SERPL-MCNC: 3.95 MG/DL (ref 0.5–1.4)
CREAT SERPL-MCNC: 3.96 MG/DL (ref 0.5–1.4)
CREAT SERPL-MCNC: 3.96 MG/DL (ref 0.5–1.4)
CREAT SERPL-MCNC: 3.98 MG/DL (ref 0.5–1.4)
CREAT SERPL-MCNC: 4.08 MG/DL (ref 0.5–1.4)
CREAT SERPL-MCNC: 4.09 MG/DL (ref 0.5–1.4)
CREAT SERPL-MCNC: 4.16 MG/DL (ref 0.5–1.4)
CREAT UR-MCNC: 117.35 MG/DL
CREAT UR-MCNC: 124.83 MG/DL
CREAT UR-MCNC: 132.06 MG/DL
CREAT UR-MCNC: 82.55 MG/DL
CYTOPLASMIC PATTERN TITER Q5148: ABNORMAL
D DIMER PPP IA.FEU-MCNC: 11.52 UG/ML (FEU) (ref 0–0.5)
D DIMER PPP IA.FEU-MCNC: 3.67 UG/ML (FEU) (ref 0–0.5)
DSDNA AB TITR SER CLIF: 11 IU (ref 0–24)
EKG IMPRESSION: NORMAL
ENA JO1 AB TITR SER: 2 AU/ML (ref 0–40)
ENA SCL70 IGG SER QL: 1 AU/ML (ref 0–40)
ENA SM IGG SER-ACNC: 2 AU/ML (ref 0–40)
ENA SS-B IGG SER IA-ACNC: 0 AU/ML (ref 0–40)
EOSINOPHIL # BLD AUTO: 0 K/UL (ref 0–0.51)
EOSINOPHIL # BLD AUTO: 0.1 K/UL (ref 0–0.51)
EOSINOPHIL # BLD AUTO: 0.12 K/UL (ref 0–0.51)
EOSINOPHIL # BLD AUTO: 0.13 K/UL (ref 0–0.51)
EOSINOPHIL # BLD AUTO: 0.14 K/UL (ref 0–0.51)
EOSINOPHIL # BLD AUTO: 0.15 K/UL (ref 0–0.51)
EOSINOPHIL # BLD AUTO: 0.17 K/UL (ref 0–0.51)
EOSINOPHIL # BLD AUTO: 0.18 K/UL (ref 0–0.51)
EOSINOPHIL # BLD AUTO: 0.2 K/UL (ref 0–0.51)
EOSINOPHIL # BLD AUTO: 0.2 K/UL (ref 0–0.51)
EOSINOPHIL # BLD AUTO: 0.21 K/UL (ref 0–0.51)
EOSINOPHIL # BLD AUTO: 0.28 K/UL (ref 0–0.51)
EOSINOPHIL NFR BLD: 0 % (ref 0–6.9)
EOSINOPHIL NFR BLD: 1 % (ref 0–6.9)
EOSINOPHIL NFR BLD: 1.8 % (ref 0–6.9)
EOSINOPHIL NFR BLD: 2.2 % (ref 0–6.9)
EOSINOPHIL NFR BLD: 2.6 % (ref 0–6.9)
EOSINOPHIL NFR BLD: 2.7 % (ref 0–6.9)
EOSINOPHIL NFR BLD: 2.8 % (ref 0–6.9)
EOSINOPHIL NFR BLD: 2.8 % (ref 0–6.9)
EOSINOPHIL NFR BLD: 3.2 % (ref 0–6.9)
EOSINOPHIL NFR BLD: 3.6 % (ref 0–6.9)
EOSINOPHIL NFR BLD: 3.9 % (ref 0–6.9)
EOSINOPHIL NFR BLD: 3.9 % (ref 0–6.9)
EPI CELLS #/AREA URNS HPF: ABNORMAL /HPF
EPI CELLS #/AREA URNS HPF: NEGATIVE /HPF
ERYTHROCYTE [DISTWIDTH] IN BLOOD BY AUTOMATED COUNT: 38.7 FL (ref 35.9–50)
ERYTHROCYTE [DISTWIDTH] IN BLOOD BY AUTOMATED COUNT: 39.2 FL (ref 35.9–50)
ERYTHROCYTE [DISTWIDTH] IN BLOOD BY AUTOMATED COUNT: 40 FL (ref 35.9–50)
ERYTHROCYTE [DISTWIDTH] IN BLOOD BY AUTOMATED COUNT: 40.5 FL (ref 35.9–50)
ERYTHROCYTE [DISTWIDTH] IN BLOOD BY AUTOMATED COUNT: 41 FL (ref 35.9–50)
ERYTHROCYTE [DISTWIDTH] IN BLOOD BY AUTOMATED COUNT: 41.4 FL (ref 35.9–50)
ERYTHROCYTE [DISTWIDTH] IN BLOOD BY AUTOMATED COUNT: 41.5 FL (ref 35.9–50)
ERYTHROCYTE [DISTWIDTH] IN BLOOD BY AUTOMATED COUNT: 42 FL (ref 35.9–50)
ERYTHROCYTE [DISTWIDTH] IN BLOOD BY AUTOMATED COUNT: 42.4 FL (ref 35.9–50)
ERYTHROCYTE [DISTWIDTH] IN BLOOD BY AUTOMATED COUNT: 43.4 FL (ref 35.9–50)
ERYTHROCYTE [DISTWIDTH] IN BLOOD BY AUTOMATED COUNT: 43.9 FL (ref 35.9–50)
ERYTHROCYTE [DISTWIDTH] IN BLOOD BY AUTOMATED COUNT: 43.9 FL (ref 35.9–50)
ERYTHROCYTE [DISTWIDTH] IN BLOOD BY AUTOMATED COUNT: 44.5 FL (ref 35.9–50)
ERYTHROCYTE [DISTWIDTH] IN BLOOD BY AUTOMATED COUNT: 45 FL (ref 35.9–50)
ERYTHROCYTE [DISTWIDTH] IN BLOOD BY AUTOMATED COUNT: 45.1 FL (ref 35.9–50)
ERYTHROCYTE [DISTWIDTH] IN BLOOD BY AUTOMATED COUNT: 45.2 FL (ref 35.9–50)
ERYTHROCYTE [DISTWIDTH] IN BLOOD BY AUTOMATED COUNT: 46.5 FL (ref 35.9–50)
ERYTHROCYTE [DISTWIDTH] IN BLOOD BY AUTOMATED COUNT: 46.8 FL (ref 35.9–50)
EST. AVERAGE GLUCOSE BLD GHB EST-MCNC: 114 MG/DL
FASTING STATUS PATIENT QL REPORTED: NORMAL
FERRITIN SERPL-MCNC: 400 NG/ML (ref 10–291)
FIBRINOGEN PPP-MCNC: 426 MG/DL (ref 215–460)
FIBRINOGEN PPP-MCNC: 446 MG/DL (ref 215–460)
GAMMA GLOB SERPL ELPH-MCNC: 1.14 G/DL (ref 0.62–1.51)
GAMMA INTERFERON BACKGROUND BLD IA-ACNC: 0.21 IU/ML
GFR SERPLBLD CREATININE-BSD FMLA CKD-EPI: 10 ML/MIN/1.73 M 2
GFR SERPLBLD CREATININE-BSD FMLA CKD-EPI: 11 ML/MIN/1.73 M 2
GFR SERPLBLD CREATININE-BSD FMLA CKD-EPI: 12 ML/MIN/1.73 M 2
GFR SERPLBLD CREATININE-BSD FMLA CKD-EPI: 12 ML/MIN/1.73 M 2
GFR SERPLBLD CREATININE-BSD FMLA CKD-EPI: 13 ML/MIN/1.73 M 2
GFR SERPLBLD CREATININE-BSD FMLA CKD-EPI: 13 ML/MIN/1.73 M 2
GFR SERPLBLD CREATININE-BSD FMLA CKD-EPI: 15 ML/MIN/1.73 M 2
GFR SERPLBLD CREATININE-BSD FMLA CKD-EPI: 16 ML/MIN/1.73 M 2
GFR SERPLBLD CREATININE-BSD FMLA CKD-EPI: 17 ML/MIN/1.73 M 2
GFR SERPLBLD CREATININE-BSD FMLA CKD-EPI: 17 ML/MIN/1.73 M 2
GFR SERPLBLD CREATININE-BSD FMLA CKD-EPI: 18 ML/MIN/1.73 M 2
GFR SERPLBLD CREATININE-BSD FMLA CKD-EPI: 18 ML/MIN/1.73 M 2
GFR SERPLBLD CREATININE-BSD FMLA CKD-EPI: 19 ML/MIN/1.73 M 2
GFR SERPLBLD CREATININE-BSD FMLA CKD-EPI: 19 ML/MIN/1.73 M 2
GFR SERPLBLD CREATININE-BSD FMLA CKD-EPI: 20 ML/MIN/1.73 M 2
GFR SERPLBLD CREATININE-BSD FMLA CKD-EPI: 21 ML/MIN/1.73 M 2
GFR SERPLBLD CREATININE-BSD FMLA CKD-EPI: 27 ML/MIN/1.73 M 2
GFR SERPLBLD CREATININE-BSD FMLA CKD-EPI: 29 ML/MIN/1.73 M 2
GLOBULIN SER CALC-MCNC: 2.4 G/DL (ref 1.9–3.5)
GLOBULIN SER CALC-MCNC: 2.6 G/DL (ref 1.9–3.5)
GLOBULIN SER CALC-MCNC: 2.8 G/DL (ref 1.9–3.5)
GLOBULIN SER CALC-MCNC: 2.9 G/DL (ref 1.9–3.5)
GLOBULIN SER CALC-MCNC: 3 G/DL (ref 1.9–3.5)
GLOBULIN SER CALC-MCNC: 3 G/DL (ref 1.9–3.5)
GLOBULIN SER CALC-MCNC: 3.4 G/DL (ref 1.9–3.5)
GLOBULIN SER CALC-MCNC: 3.5 G/DL (ref 1.9–3.5)
GLOBULIN SER CALC-MCNC: 3.5 G/DL (ref 1.9–3.5)
GLOBULIN SER CALC-MCNC: 3.6 G/DL (ref 1.9–3.5)
GLOBULIN SER CALC-MCNC: 3.6 G/DL (ref 1.9–3.5)
GLOBULIN SER CALC-MCNC: ABNORMAL G/DL (ref 1.9–3.5)
GLUCOSE BLD STRIP.AUTO-MCNC: 105 MG/DL (ref 65–99)
GLUCOSE BLD STRIP.AUTO-MCNC: 130 MG/DL (ref 65–99)
GLUCOSE SERPL-MCNC: 103 MG/DL (ref 65–99)
GLUCOSE SERPL-MCNC: 105 MG/DL (ref 65–99)
GLUCOSE SERPL-MCNC: 107 MG/DL (ref 65–99)
GLUCOSE SERPL-MCNC: 107 MG/DL (ref 65–99)
GLUCOSE SERPL-MCNC: 117 MG/DL (ref 65–99)
GLUCOSE SERPL-MCNC: 117 MG/DL (ref 65–99)
GLUCOSE SERPL-MCNC: 123 MG/DL (ref 65–99)
GLUCOSE SERPL-MCNC: 151 MG/DL (ref 65–99)
GLUCOSE SERPL-MCNC: 80 MG/DL (ref 65–99)
GLUCOSE SERPL-MCNC: 82 MG/DL (ref 65–99)
GLUCOSE SERPL-MCNC: 83 MG/DL (ref 65–99)
GLUCOSE SERPL-MCNC: 84 MG/DL (ref 65–99)
GLUCOSE SERPL-MCNC: 84 MG/DL (ref 65–99)
GLUCOSE SERPL-MCNC: 86 MG/DL (ref 65–99)
GLUCOSE SERPL-MCNC: 87 MG/DL (ref 65–99)
GLUCOSE SERPL-MCNC: 88 MG/DL (ref 65–99)
GLUCOSE SERPL-MCNC: 90 MG/DL (ref 65–99)
GLUCOSE SERPL-MCNC: 90 MG/DL (ref 65–99)
GLUCOSE SERPL-MCNC: 92 MG/DL (ref 65–99)
GLUCOSE SERPL-MCNC: 94 MG/DL (ref 65–99)
GLUCOSE SERPL-MCNC: 94 MG/DL (ref 65–99)
GLUCOSE SERPL-MCNC: 95 MG/DL (ref 65–99)
GLUCOSE SERPL-MCNC: 96 MG/DL (ref 65–99)
GLUCOSE SERPL-MCNC: 97 MG/DL (ref 65–99)
GLUCOSE SERPL-MCNC: 98 MG/DL (ref 65–99)
GLUCOSE UR STRIP.AUTO-MCNC: 100 MG/DL
GLUCOSE UR STRIP.AUTO-MCNC: NEGATIVE MG/DL
HAPTOGLOB SERPL-MCNC: 181 MG/DL (ref 30–200)
HAV IGM SERPL QL IA: NORMAL
HBA1C MFR BLD: 5.6 % (ref 4–5.6)
HBV CORE IGM SER QL: NORMAL
HBV SURFACE AB SERPL IA-ACNC: <3.5 MIU/ML (ref 0–10)
HBV SURFACE AG SER QL: NORMAL
HCT VFR BLD AUTO: 22.2 % (ref 37–47)
HCT VFR BLD AUTO: 22.3 % (ref 37–47)
HCT VFR BLD AUTO: 22.5 % (ref 37–47)
HCT VFR BLD AUTO: 23.2 % (ref 37–47)
HCT VFR BLD AUTO: 23.4 % (ref 37–47)
HCT VFR BLD AUTO: 24.2 % (ref 37–47)
HCT VFR BLD AUTO: 24.2 % (ref 37–47)
HCT VFR BLD AUTO: 24.8 % (ref 37–47)
HCT VFR BLD AUTO: 25.3 % (ref 37–47)
HCT VFR BLD AUTO: 26.7 % (ref 37–47)
HCT VFR BLD AUTO: 27.6 % (ref 37–47)
HCT VFR BLD AUTO: 31.3 % (ref 37–47)
HCT VFR BLD AUTO: 31.4 % (ref 37–47)
HCT VFR BLD AUTO: 32.4 % (ref 37–47)
HCT VFR BLD AUTO: 32.6 % (ref 37–47)
HCT VFR BLD AUTO: 32.7 % (ref 37–47)
HCT VFR BLD AUTO: 32.8 % (ref 37–47)
HCT VFR BLD AUTO: 33.6 % (ref 37–47)
HCV AB SER QL: NORMAL
HDLC SERPL-MCNC: 47 MG/DL
HGB BLD-MCNC: 11 G/DL (ref 12–16)
HGB BLD-MCNC: 11 G/DL (ref 12–16)
HGB BLD-MCNC: 11.1 G/DL (ref 12–16)
HGB BLD-MCNC: 11.1 G/DL (ref 12–16)
HGB BLD-MCNC: 11.4 G/DL (ref 12–16)
HGB BLD-MCNC: 11.6 G/DL (ref 12–16)
HGB BLD-MCNC: 12.2 G/DL (ref 12–16)
HGB BLD-MCNC: 7.5 G/DL (ref 12–16)
HGB BLD-MCNC: 7.6 G/DL (ref 12–16)
HGB BLD-MCNC: 7.8 G/DL (ref 12–16)
HGB BLD-MCNC: 7.8 G/DL (ref 12–16)
HGB BLD-MCNC: 8 G/DL (ref 12–16)
HGB BLD-MCNC: 8.5 G/DL (ref 12–16)
HGB BLD-MCNC: 8.5 G/DL (ref 12–16)
HGB BLD-MCNC: 8.7 G/DL (ref 12–16)
HGB BLD-MCNC: 8.8 G/DL (ref 12–16)
HGB BLD-MCNC: 9.6 G/DL (ref 12–16)
HGB BLD-MCNC: 9.7 G/DL (ref 12–16)
HGB RETIC QN AUTO: 35.4 PG/CELL (ref 29–35)
HIV 1+2 AB+HIV1 P24 AG SERPL QL IA: NORMAL
HYALINE CASTS #/AREA URNS LPF: ABNORMAL /LPF
IMM GRANULOCYTES # BLD AUTO: 0.01 K/UL (ref 0–0.11)
IMM GRANULOCYTES # BLD AUTO: 0.01 K/UL (ref 0–0.11)
IMM GRANULOCYTES # BLD AUTO: 0.02 K/UL (ref 0–0.11)
IMM GRANULOCYTES # BLD AUTO: 0.03 K/UL (ref 0–0.11)
IMM GRANULOCYTES # BLD AUTO: 0.03 K/UL (ref 0–0.11)
IMM GRANULOCYTES # BLD AUTO: 0.05 K/UL (ref 0–0.11)
IMM GRANULOCYTES # BLD AUTO: 0.05 K/UL (ref 0–0.11)
IMM GRANULOCYTES # BLD AUTO: 0.06 K/UL (ref 0–0.11)
IMM GRANULOCYTES NFR BLD AUTO: 0.2 % (ref 0–0.9)
IMM GRANULOCYTES NFR BLD AUTO: 0.2 % (ref 0–0.9)
IMM GRANULOCYTES NFR BLD AUTO: 0.3 % (ref 0–0.9)
IMM GRANULOCYTES NFR BLD AUTO: 0.4 % (ref 0–0.9)
IMM GRANULOCYTES NFR BLD AUTO: 0.5 % (ref 0–0.9)
IMM GRANULOCYTES NFR BLD AUTO: 0.6 % (ref 0–0.9)
IMM GRANULOCYTES NFR BLD AUTO: 0.9 % (ref 0–0.9)
IMM RETICS NFR: 4.8 % (ref 9.3–17.4)
INR PPP: 1.17 (ref 0.87–1.13)
INR PPP: 1.19 (ref 0.87–1.13)
INTERPRETATION SERPL IFE-IMP: ABNORMAL
IRON SATN MFR SERPL: 30 % (ref 15–55)
IRON SERPL-MCNC: 42 UG/DL (ref 40–170)
KETONES UR STRIP.AUTO-MCNC: ABNORMAL MG/DL
KETONES UR STRIP.AUTO-MCNC: NEGATIVE MG/DL
LACTATE SERPL-SCNC: 0.6 MMOL/L (ref 0.5–2)
LACTATE SERPL-SCNC: 1.6 MMOL/L (ref 0.5–2)
LDH SERPL L TO P-CCNC: 309 U/L (ref 107–266)
LDH SERPL L TO P-CCNC: 319 U/L (ref 107–266)
LDLC SERPL CALC-MCNC: 268 MG/DL
LEUKOCYTE ESTERASE UR QL STRIP.AUTO: ABNORMAL
LV EJECT FRACT  99904: 65
LV EJECT FRACT MOD 2C 99903: 78.63
LV EJECT FRACT MOD 4C 99902: 71.82
LV EJECT FRACT MOD BP 99901: 75.71
LYMPHOCYTES # BLD AUTO: 0.7 K/UL (ref 1–4.8)
LYMPHOCYTES # BLD AUTO: 1.07 K/UL (ref 1–4.8)
LYMPHOCYTES # BLD AUTO: 1.13 K/UL (ref 1–4.8)
LYMPHOCYTES # BLD AUTO: 1.14 K/UL (ref 1–4.8)
LYMPHOCYTES # BLD AUTO: 1.28 K/UL (ref 1–4.8)
LYMPHOCYTES # BLD AUTO: 1.3 K/UL (ref 1–4.8)
LYMPHOCYTES # BLD AUTO: 1.35 K/UL (ref 1–4.8)
LYMPHOCYTES # BLD AUTO: 1.45 K/UL (ref 1–4.8)
LYMPHOCYTES # BLD AUTO: 1.53 K/UL (ref 1–4.8)
LYMPHOCYTES # BLD AUTO: 1.79 K/UL (ref 1–4.8)
LYMPHOCYTES # BLD AUTO: 1.87 K/UL (ref 1–4.8)
LYMPHOCYTES # BLD AUTO: 2.15 K/UL (ref 1–4.8)
LYMPHOCYTES NFR BLD: 15.5 % (ref 22–41)
LYMPHOCYTES NFR BLD: 18.1 % (ref 22–41)
LYMPHOCYTES NFR BLD: 18.1 % (ref 22–41)
LYMPHOCYTES NFR BLD: 20.6 % (ref 22–41)
LYMPHOCYTES NFR BLD: 22.2 % (ref 22–41)
LYMPHOCYTES NFR BLD: 22.5 % (ref 22–41)
LYMPHOCYTES NFR BLD: 23.6 % (ref 22–41)
LYMPHOCYTES NFR BLD: 25.5 % (ref 22–41)
LYMPHOCYTES NFR BLD: 28.4 % (ref 22–41)
LYMPHOCYTES NFR BLD: 28.5 % (ref 22–41)
LYMPHOCYTES NFR BLD: 31.7 % (ref 22–41)
LYMPHOCYTES NFR BLD: 6.4 % (ref 22–41)
M TB IFN-G BLD-IMP: NEGATIVE
M TB IFN-G CD4+ BCKGRND COR BLD-ACNC: 0 IU/ML
MAGNESIUM SERPL-MCNC: 1.7 MG/DL (ref 1.5–2.5)
MAGNESIUM SERPL-MCNC: 1.9 MG/DL (ref 1.5–2.5)
MAGNESIUM SERPL-MCNC: 2 MG/DL (ref 1.5–2.5)
MAGNESIUM SERPL-MCNC: 2.1 MG/DL (ref 1.5–2.5)
MAGNESIUM SERPL-MCNC: 2.2 MG/DL (ref 1.5–2.5)
MAGNESIUM SERPL-MCNC: 2.3 MG/DL (ref 1.5–2.5)
MCH RBC QN AUTO: 28.7 PG (ref 27–33)
MCH RBC QN AUTO: 28.8 PG (ref 27–33)
MCH RBC QN AUTO: 28.9 PG (ref 27–33)
MCH RBC QN AUTO: 28.9 PG (ref 27–33)
MCH RBC QN AUTO: 29 PG (ref 27–33)
MCH RBC QN AUTO: 29 PG (ref 27–33)
MCH RBC QN AUTO: 29.2 PG (ref 27–33)
MCH RBC QN AUTO: 29.3 PG (ref 27–33)
MCH RBC QN AUTO: 29.3 PG (ref 27–33)
MCH RBC QN AUTO: 29.4 PG (ref 27–33)
MCH RBC QN AUTO: 29.5 PG (ref 27–33)
MCH RBC QN AUTO: 29.6 PG (ref 27–33)
MCHC RBC AUTO-ENTMCNC: 33.6 G/DL (ref 33.6–35)
MCHC RBC AUTO-ENTMCNC: 33.6 G/DL (ref 33.6–35)
MCHC RBC AUTO-ENTMCNC: 33.7 G/DL (ref 33.6–35)
MCHC RBC AUTO-ENTMCNC: 33.8 G/DL (ref 33.6–35)
MCHC RBC AUTO-ENTMCNC: 33.9 G/DL (ref 33.6–35)
MCHC RBC AUTO-ENTMCNC: 34.2 G/DL (ref 33.6–35)
MCHC RBC AUTO-ENTMCNC: 34.4 G/DL (ref 33.6–35)
MCHC RBC AUTO-ENTMCNC: 34.8 G/DL (ref 33.6–35)
MCHC RBC AUTO-ENTMCNC: 35 G/DL (ref 33.6–35)
MCHC RBC AUTO-ENTMCNC: 35.1 G/DL (ref 33.6–35)
MCHC RBC AUTO-ENTMCNC: 35.5 G/DL (ref 33.6–35)
MCHC RBC AUTO-ENTMCNC: 35.5 G/DL (ref 33.6–35)
MCHC RBC AUTO-ENTMCNC: 35.8 G/DL (ref 33.6–35)
MCHC RBC AUTO-ENTMCNC: 36 G/DL (ref 33.6–35)
MCHC RBC AUTO-ENTMCNC: 36.3 G/DL (ref 33.6–35)
MCV RBC AUTO: 80.2 FL (ref 81.4–97.8)
MCV RBC AUTO: 80.8 FL (ref 81.4–97.8)
MCV RBC AUTO: 81.1 FL (ref 81.4–97.8)
MCV RBC AUTO: 82.2 FL (ref 81.4–97.8)
MCV RBC AUTO: 82.5 FL (ref 81.4–97.8)
MCV RBC AUTO: 82.6 FL (ref 81.4–97.8)
MCV RBC AUTO: 82.6 FL (ref 81.4–97.8)
MCV RBC AUTO: 82.9 FL (ref 81.4–97.8)
MCV RBC AUTO: 83.2 FL (ref 81.4–97.8)
MCV RBC AUTO: 83.2 FL (ref 81.4–97.8)
MCV RBC AUTO: 83.9 FL (ref 81.4–97.8)
MCV RBC AUTO: 84.9 FL (ref 81.4–97.8)
MCV RBC AUTO: 85.6 FL (ref 81.4–97.8)
MCV RBC AUTO: 86 FL (ref 81.4–97.8)
MCV RBC AUTO: 86.9 FL (ref 81.4–97.8)
MCV RBC AUTO: 87.2 FL (ref 81.4–97.8)
MCV RBC AUTO: 87.5 FL (ref 81.4–97.8)
MCV RBC AUTO: 87.5 FL (ref 81.4–97.8)
MICRO URNS: ABNORMAL
MICROALBUMIN UR-MCNC: >440 MG/DL
MICROALBUMIN/CREAT UR: NORMAL MG/G (ref 0–30)
MITOGEN IGNF BCKGRD COR BLD-ACNC: >10 IU/ML
MONOCLON BAND OBS SERPL: ABNORMAL
MONOCLONAL PROTEIN NL11656: ABNORMAL G/DL
MONOCYTES # BLD AUTO: 0.51 K/UL (ref 0–0.85)
MONOCYTES # BLD AUTO: 0.54 K/UL (ref 0–0.85)
MONOCYTES # BLD AUTO: 0.54 K/UL (ref 0–0.85)
MONOCYTES # BLD AUTO: 0.57 K/UL (ref 0–0.85)
MONOCYTES # BLD AUTO: 0.59 K/UL (ref 0–0.85)
MONOCYTES # BLD AUTO: 0.64 K/UL (ref 0–0.85)
MONOCYTES # BLD AUTO: 0.64 K/UL (ref 0–0.85)
MONOCYTES # BLD AUTO: 0.67 K/UL (ref 0–0.85)
MONOCYTES # BLD AUTO: 0.75 K/UL (ref 0–0.85)
MONOCYTES # BLD AUTO: 0.75 K/UL (ref 0–0.85)
MONOCYTES # BLD AUTO: 0.77 K/UL (ref 0–0.85)
MONOCYTES # BLD AUTO: 0.83 K/UL (ref 0–0.85)
MONOCYTES NFR BLD AUTO: 10.2 % (ref 0–13.4)
MONOCYTES NFR BLD AUTO: 11.2 % (ref 0–13.4)
MONOCYTES NFR BLD AUTO: 11.8 % (ref 0–13.4)
MONOCYTES NFR BLD AUTO: 12.5 % (ref 0–13.4)
MONOCYTES NFR BLD AUTO: 14.1 % (ref 0–13.4)
MONOCYTES NFR BLD AUTO: 14.2 % (ref 0–13.4)
MONOCYTES NFR BLD AUTO: 4.9 % (ref 0–13.4)
MONOCYTES NFR BLD AUTO: 6.5 % (ref 0–13.4)
MONOCYTES NFR BLD AUTO: 8.2 % (ref 0–13.4)
MONOCYTES NFR BLD AUTO: 8.6 % (ref 0–13.4)
MONOCYTES NFR BLD AUTO: 9.8 % (ref 0–13.4)
MONOCYTES NFR BLD AUTO: 9.9 % (ref 0–13.4)
MUCOUS THREADS #/AREA URNS HPF: ABNORMAL /HPF
MYELOPEROXIDASE AB SER-ACNC: 0 AU/ML (ref 0–19)
NEUTROPHILS # BLD AUTO: 2.89 K/UL (ref 2–7.15)
NEUTROPHILS # BLD AUTO: 2.93 K/UL (ref 2–7.15)
NEUTROPHILS # BLD AUTO: 2.99 K/UL (ref 2–7.15)
NEUTROPHILS # BLD AUTO: 3.08 K/UL (ref 2–7.15)
NEUTROPHILS # BLD AUTO: 3.25 K/UL (ref 2–7.15)
NEUTROPHILS # BLD AUTO: 3.63 K/UL (ref 2–7.15)
NEUTROPHILS # BLD AUTO: 4.12 K/UL (ref 2–7.15)
NEUTROPHILS # BLD AUTO: 4.17 K/UL (ref 2–7.15)
NEUTROPHILS # BLD AUTO: 4.32 K/UL (ref 2–7.15)
NEUTROPHILS # BLD AUTO: 4.96 K/UL (ref 2–7.15)
NEUTROPHILS # BLD AUTO: 8.79 K/UL (ref 2–7.15)
NEUTROPHILS # BLD AUTO: 9.66 K/UL (ref 2–7.15)
NEUTROPHILS NFR BLD: 48.9 % (ref 44–72)
NEUTROPHILS NFR BLD: 54.4 % (ref 44–72)
NEUTROPHILS NFR BLD: 56.4 % (ref 44–72)
NEUTROPHILS NFR BLD: 57.3 % (ref 44–72)
NEUTROPHILS NFR BLD: 60 % (ref 44–72)
NEUTROPHILS NFR BLD: 60.8 % (ref 44–72)
NEUTROPHILS NFR BLD: 63.8 % (ref 44–72)
NEUTROPHILS NFR BLD: 66.2 % (ref 44–72)
NEUTROPHILS NFR BLD: 68.8 % (ref 44–72)
NEUTROPHILS NFR BLD: 69.7 % (ref 44–72)
NEUTROPHILS NFR BLD: 76.1 % (ref 44–72)
NEUTROPHILS NFR BLD: 87.8 % (ref 44–72)
NITRITE UR QL STRIP.AUTO: NEGATIVE
NRBC # BLD AUTO: 0 K/UL
NRBC BLD-RTO: 0 /100 WBC
NT-PROBNP SERPL IA-MCNC: 1708 PG/ML (ref 0–125)
NT-PROBNP SERPL IA-MCNC: 2404 PG/ML (ref 0–125)
NT-PROBNP SERPL IA-MCNC: 4806 PG/ML (ref 0–125)
NUCLEAR IGG SER QL IA: DETECTED
OSMOLALITY SERPL: 267 MOSM/KG H2O (ref 278–298)
OSMOLALITY UR: 231 MOSM/KG H2O (ref 300–900)
OSMOLALITY UR: 297 MOSM/KG H2O (ref 300–900)
OSMOLALITY UR: 307 MOSM/KG H2O (ref 300–900)
PATHOLOGY CONSULT NOTE: NORMAL
PATHOLOGY CONSULT NOTE: NORMAL
PATHOLOGY STUDY: ABNORMAL
PH UR STRIP.AUTO: 6 [PH] (ref 5–8)
PH UR STRIP.AUTO: 6.5 [PH] (ref 5–8)
PH UR STRIP.AUTO: 7.5 [PH] (ref 5–8)
PHOSPHATE SERPL-MCNC: 3 MG/DL (ref 2.5–4.5)
PHOSPHATE SERPL-MCNC: 3.4 MG/DL (ref 2.5–4.5)
PHOSPHATE SERPL-MCNC: 4.3 MG/DL (ref 2.5–4.5)
PHOSPHATE SERPL-MCNC: 4.7 MG/DL (ref 2.5–4.5)
PHOSPHATE SERPL-MCNC: 4.7 MG/DL (ref 2.5–4.5)
PHOSPHATE SERPL-MCNC: 5.1 MG/DL (ref 2.5–4.5)
PHOSPHATE SERPL-MCNC: 5.1 MG/DL (ref 2.5–4.5)
PHOSPHATE SERPL-MCNC: 5.4 MG/DL (ref 2.5–4.5)
PHOSPHATE SERPL-MCNC: 5.7 MG/DL (ref 2.5–4.5)
PLA2R IGG SERPL QL IF: NORMAL
PLATELET # BLD AUTO: 113 K/UL (ref 164–446)
PLATELET # BLD AUTO: 156 K/UL (ref 164–446)
PLATELET # BLD AUTO: 170 K/UL (ref 164–446)
PLATELET # BLD AUTO: 60 K/UL (ref 164–446)
PLATELET # BLD AUTO: 68 K/UL (ref 164–446)
PLATELET # BLD AUTO: 70 K/UL (ref 164–446)
PLATELET # BLD AUTO: 71 K/UL (ref 164–446)
PLATELET # BLD AUTO: 72 K/UL (ref 164–446)
PLATELET # BLD AUTO: 72 K/UL (ref 164–446)
PLATELET # BLD AUTO: 76 K/UL (ref 164–446)
PLATELET # BLD AUTO: 76 K/UL (ref 164–446)
PLATELET # BLD AUTO: 77 K/UL (ref 164–446)
PLATELET # BLD AUTO: 78 K/UL (ref 164–446)
PLATELET # BLD AUTO: 81 K/UL (ref 164–446)
PLATELET # BLD AUTO: 87 K/UL (ref 164–446)
PMV BLD AUTO: 10 FL (ref 9–12.9)
PMV BLD AUTO: 10.1 FL (ref 9–12.9)
PMV BLD AUTO: 10.2 FL (ref 9–12.9)
PMV BLD AUTO: 10.3 FL (ref 9–12.9)
PMV BLD AUTO: 10.4 FL (ref 9–12.9)
PMV BLD AUTO: 10.4 FL (ref 9–12.9)
PMV BLD AUTO: 10.5 FL (ref 9–12.9)
PMV BLD AUTO: 10.6 FL (ref 9–12.9)
PMV BLD AUTO: 10.6 FL (ref 9–12.9)
PMV BLD AUTO: 10.8 FL (ref 9–12.9)
PMV BLD AUTO: 10.9 FL (ref 9–12.9)
PMV BLD AUTO: 8.8 FL (ref 9–12.9)
PMV BLD AUTO: 9.4 FL (ref 9–12.9)
PMV BLD AUTO: 9.5 FL (ref 9–12.9)
PMV BLD AUTO: 9.7 FL (ref 9–12.9)
PMV BLD AUTO: 9.8 FL (ref 9–12.9)
PMV BLD AUTO: 9.9 FL (ref 9–12.9)
PMV BLD AUTO: 9.9 FL (ref 9–12.9)
POTASSIUM SERPL-SCNC: 3.6 MMOL/L (ref 3.6–5.5)
POTASSIUM SERPL-SCNC: 3.8 MMOL/L (ref 3.6–5.5)
POTASSIUM SERPL-SCNC: 3.9 MMOL/L (ref 3.6–5.5)
POTASSIUM SERPL-SCNC: 4 MMOL/L (ref 3.6–5.5)
POTASSIUM SERPL-SCNC: 4.2 MMOL/L (ref 3.6–5.5)
POTASSIUM SERPL-SCNC: 4.3 MMOL/L (ref 3.6–5.5)
POTASSIUM SERPL-SCNC: 4.3 MMOL/L (ref 3.6–5.5)
POTASSIUM SERPL-SCNC: 4.4 MMOL/L (ref 3.6–5.5)
POTASSIUM SERPL-SCNC: 4.5 MMOL/L (ref 3.6–5.5)
POTASSIUM SERPL-SCNC: 4.6 MMOL/L (ref 3.6–5.5)
POTASSIUM SERPL-SCNC: 4.8 MMOL/L (ref 3.6–5.5)
POTASSIUM SERPL-SCNC: 4.9 MMOL/L (ref 3.6–5.5)
POTASSIUM SERPL-SCNC: 4.9 MMOL/L (ref 3.6–5.5)
POTASSIUM SERPL-SCNC: 5 MMOL/L (ref 3.6–5.5)
POTASSIUM SERPL-SCNC: 5.1 MMOL/L (ref 3.6–5.5)
POTASSIUM SERPL-SCNC: 5.1 MMOL/L (ref 3.6–5.5)
POTASSIUM SERPL-SCNC: 5.2 MMOL/L (ref 3.6–5.5)
POTASSIUM SERPL-SCNC: 5.3 MMOL/L (ref 3.6–5.5)
POTASSIUM SERPL-SCNC: 5.3 MMOL/L (ref 3.6–5.5)
PROCALCITONIN SERPL-MCNC: 0.15 NG/ML
PROT SERPL-MCNC: 3.8 G/DL (ref 6–8.2)
PROT SERPL-MCNC: 4 G/DL (ref 6–8.2)
PROT SERPL-MCNC: 4.1 G/DL (ref 6–8.2)
PROT SERPL-MCNC: 4.3 G/DL (ref 6–8.2)
PROT SERPL-MCNC: 4.4 G/DL (ref 6–8.2)
PROT SERPL-MCNC: 4.8 G/DL (ref 6–8.2)
PROT SERPL-MCNC: 5 G/DL (ref 6–8.2)
PROT SERPL-MCNC: 5 G/DL (ref 6–8.2)
PROT SERPL-MCNC: 5.1 G/DL (ref 6.3–8.2)
PROT SERPL-MCNC: 5.4 G/DL (ref 6–8.2)
PROT SERPL-MCNC: 5.7 G/DL (ref 6–8.2)
PROT SERPL-MCNC: 5.8 G/DL (ref 6–8.2)
PROT SERPL-MCNC: 5.9 G/DL (ref 6–8.2)
PROT SERPL-MCNC: 5.9 G/DL (ref 6–8.2)
PROT UR QL STRIP: >=1000 MG/DL
PROT UR QL STRIP: >=1000 MG/DL
PROT UR QL STRIP: >=300 MG/DL
PROT UR-MCNC: >600 MG/DL (ref 0–15)
PROTEINASE3 AB SER-ACNC: 1 AU/ML (ref 0–19)
PROTHROMBIN TIME: 14.4 SEC (ref 12–14.6)
PROTHROMBIN TIME: 15 SEC (ref 12–14.6)
PTH-INTACT SERPL-MCNC: 76.5 PG/ML (ref 14–72)
QFT TB2 - NIL TBQ2: -0.01 IU/ML
RBC # BLD AUTO: 2.55 M/UL (ref 4.2–5.4)
RBC # BLD AUTO: 2.59 M/UL (ref 4.2–5.4)
RBC # BLD AUTO: 2.65 M/UL (ref 4.2–5.4)
RBC # BLD AUTO: 2.69 M/UL (ref 4.2–5.4)
RBC # BLD AUTO: 2.72 M/UL (ref 4.2–5.4)
RBC # BLD AUTO: 2.91 M/UL (ref 4.2–5.4)
RBC # BLD AUTO: 2.91 M/UL (ref 4.2–5.4)
RBC # BLD AUTO: 2.98 M/UL (ref 4.2–5.4)
RBC # BLD AUTO: 2.99 M/UL (ref 4.2–5.4)
RBC # BLD AUTO: 3.25 M/UL (ref 4.2–5.4)
RBC # BLD AUTO: 3.34 M/UL (ref 4.2–5.4)
RBC # BLD AUTO: 3.75 M/UL (ref 4.2–5.4)
RBC # BLD AUTO: 3.8 M/UL (ref 4.2–5.4)
RBC # BLD AUTO: 3.81 M/UL (ref 4.2–5.4)
RBC # BLD AUTO: 3.86 M/UL (ref 4.2–5.4)
RBC # BLD AUTO: 3.91 M/UL (ref 4.2–5.4)
RBC # BLD AUTO: 4.04 M/UL (ref 4.2–5.4)
RBC # BLD AUTO: 4.16 M/UL (ref 4.2–5.4)
RBC # URNS HPF: >150 /HPF
RBC # URNS HPF: >150 /HPF
RBC # URNS HPF: ABNORMAL /HPF
RBC UR QL AUTO: ABNORMAL
RENAL EPI CELLS #/AREA URNS HPF: ABNORMAL /HPF
RETICS # AUTO: 0.05 M/UL (ref 0.04–0.06)
RETICS/RBC NFR: 1.4 % (ref 0.8–2.1)
RH BLD: NORMAL
SIGNIFICANT IND 70042: ABNORMAL
SIGNIFICANT IND 70042: NORMAL
SITE SITE: ABNORMAL
SITE SITE: NORMAL
SODIUM SERPL-SCNC: 115 MMOL/L (ref 135–145)
SODIUM SERPL-SCNC: 116 MMOL/L (ref 135–145)
SODIUM SERPL-SCNC: 117 MMOL/L (ref 135–145)
SODIUM SERPL-SCNC: 118 MMOL/L (ref 135–145)
SODIUM SERPL-SCNC: 119 MMOL/L (ref 135–145)
SODIUM SERPL-SCNC: 120 MMOL/L (ref 135–145)
SODIUM SERPL-SCNC: 120 MMOL/L (ref 135–145)
SODIUM SERPL-SCNC: 121 MMOL/L (ref 135–145)
SODIUM SERPL-SCNC: 121 MMOL/L (ref 135–145)
SODIUM SERPL-SCNC: 122 MMOL/L (ref 135–145)
SODIUM SERPL-SCNC: 124 MMOL/L (ref 135–145)
SODIUM SERPL-SCNC: 125 MMOL/L (ref 135–145)
SODIUM SERPL-SCNC: 126 MMOL/L (ref 135–145)
SODIUM SERPL-SCNC: 126 MMOL/L (ref 135–145)
SODIUM SERPL-SCNC: 127 MMOL/L (ref 135–145)
SODIUM SERPL-SCNC: 128 MMOL/L (ref 135–145)
SODIUM SERPL-SCNC: 129 MMOL/L (ref 135–145)
SODIUM SERPL-SCNC: 130 MMOL/L (ref 135–145)
SODIUM SERPL-SCNC: 131 MMOL/L (ref 135–145)
SODIUM SERPL-SCNC: 132 MMOL/L (ref 135–145)
SODIUM SERPL-SCNC: 133 MMOL/L (ref 135–145)
SODIUM UR-SCNC: <20 MMOL/L
SODIUM UR-SCNC: <20 MMOL/L
SOURCE SOURCE: ABNORMAL
SOURCE SOURCE: NORMAL
SP GR UR STRIP.AUTO: 1.01
SP GR UR STRIP.AUTO: 1.02
SP GR UR STRIP.AUTO: >=1.045
SPECIMEN VOL ?TM UR: ABNORMAL ML
SSA52 R0ENA AB IGG Q0420: 148 AU/ML (ref 0–40)
SSA60 R0ENA AB IGG Q0419: 0 AU/ML (ref 0–40)
T4 FREE SERPL-MCNC: 1.2 NG/DL (ref 0.93–1.7)
T4 FREE SERPL-MCNC: 1.21 NG/DL (ref 0.93–1.7)
TIBC SERPL-MCNC: 139 UG/DL (ref 250–450)
TRIGL SERPL-MCNC: 213 MG/DL (ref 0–149)
TROPONIN T SERPL-MCNC: 28 NG/L (ref 6–19)
TROPONIN T SERPL-MCNC: 30 NG/L (ref 6–19)
TROPONIN T SERPL-MCNC: 30 NG/L (ref 6–19)
TROPONIN T SERPL-MCNC: 32 NG/L (ref 6–19)
TROPONIN T SERPL-MCNC: 33 NG/L (ref 6–19)
TROPONIN T SERPL-MCNC: 36 NG/L (ref 6–19)
TROPONIN T SERPL-MCNC: 69 NG/L (ref 6–19)
TSH SERPL DL<=0.005 MIU/L-ACNC: 6.18 UIU/ML (ref 0.38–5.33)
TSH SERPL DL<=0.005 MIU/L-ACNC: 7.25 UIU/ML (ref 0.38–5.33)
U1 SNRNP IGG SER QL: 32 UNITS (ref 0–19)
UFH PPP CHRO-ACNC: >1.1 IU/ML
UIBC SERPL-MCNC: 97 UG/DL (ref 110–370)
UNIDENT CRYS URNS QL MICRO: ABNORMAL /HPF
UROBILINOGEN UR STRIP.AUTO-MCNC: 0.2 MG/DL
VWF CP ACT/NOR PPP CHRO: 78 % (ref 40–130)
WBC # BLD AUTO: 11 K/UL (ref 4.8–10.8)
WBC # BLD AUTO: 11.6 K/UL (ref 4.8–10.8)
WBC # BLD AUTO: 5.1 K/UL (ref 4.8–10.8)
WBC # BLD AUTO: 5.3 K/UL (ref 4.8–10.8)
WBC # BLD AUTO: 5.3 K/UL (ref 4.8–10.8)
WBC # BLD AUTO: 5.4 K/UL (ref 4.8–10.8)
WBC # BLD AUTO: 5.4 K/UL (ref 4.8–10.8)
WBC # BLD AUTO: 5.5 K/UL (ref 4.8–10.8)
WBC # BLD AUTO: 5.9 K/UL (ref 4.8–10.8)
WBC # BLD AUTO: 5.9 K/UL (ref 4.8–10.8)
WBC # BLD AUTO: 6.5 K/UL (ref 4.8–10.8)
WBC # BLD AUTO: 7.2 K/UL (ref 4.8–10.8)
WBC # BLD AUTO: 7.5 K/UL (ref 4.8–10.8)
WBC # BLD AUTO: 7.5 K/UL (ref 4.8–10.8)
WBC # BLD AUTO: 7.6 K/UL (ref 4.8–10.8)
WBC #/AREA URNS HPF: ABNORMAL /HPF

## 2022-01-01 PROCEDURE — 93005 ELECTROCARDIOGRAM TRACING: CPT | Performed by: EMERGENCY MEDICINE

## 2022-01-01 PROCEDURE — 700102 HCHG RX REV CODE 250 W/ 637 OVERRIDE(OP): Performed by: HOSPITALIST

## 2022-01-01 PROCEDURE — S9126 HOSPICE CARE, IN THE HOME, P: HCPCS

## 2022-01-01 PROCEDURE — 84484 ASSAY OF TROPONIN QUANT: CPT

## 2022-01-01 PROCEDURE — 84443 ASSAY THYROID STIM HORMONE: CPT

## 2022-01-01 PROCEDURE — G0378 HOSPITAL OBSERVATION PER HR: HCPCS

## 2022-01-01 PROCEDURE — 36415 COLL VENOUS BLD VENIPUNCTURE: CPT

## 2022-01-01 PROCEDURE — 83880 ASSAY OF NATRIURETIC PEPTIDE: CPT

## 2022-01-01 PROCEDURE — 770020 HCHG ROOM/CARE - TELE (206)

## 2022-01-01 PROCEDURE — 88346 IMFLUOR 1ST 1ANTB STAIN PX: CPT

## 2022-01-01 PROCEDURE — 700101 HCHG RX REV CODE 250: Performed by: OBSTETRICS & GYNECOLOGY

## 2022-01-01 PROCEDURE — 84100 ASSAY OF PHOSPHORUS: CPT

## 2022-01-01 PROCEDURE — 700111 HCHG RX REV CODE 636 W/ 250 OVERRIDE (IP): Performed by: NURSE PRACTITIONER

## 2022-01-01 PROCEDURE — A9270 NON-COVERED ITEM OR SERVICE: HCPCS | Performed by: INTERNAL MEDICINE

## 2022-01-01 PROCEDURE — 700102 HCHG RX REV CODE 250 W/ 637 OVERRIDE(OP): Performed by: INTERNAL MEDICINE

## 2022-01-01 PROCEDURE — 80048 BASIC METABOLIC PNL TOTAL CA: CPT

## 2022-01-01 PROCEDURE — 84295 ASSAY OF SERUM SODIUM: CPT | Mod: 91

## 2022-01-01 PROCEDURE — 82043 UR ALBUMIN QUANTITATIVE: CPT

## 2022-01-01 PROCEDURE — 86900 BLOOD TYPING SEROLOGIC ABO: CPT

## 2022-01-01 PROCEDURE — A9270 NON-COVERED ITEM OR SERVICE: HCPCS | Performed by: NURSE PRACTITIONER

## 2022-01-01 PROCEDURE — 88342 IMHCHEM/IMCYTCHM 1ST ANTB: CPT

## 2022-01-01 PROCEDURE — 99239 HOSP IP/OBS DSCHRG MGMT >30: CPT | Performed by: HOSPITALIST

## 2022-01-01 PROCEDURE — 83735 ASSAY OF MAGNESIUM: CPT

## 2022-01-01 PROCEDURE — 85397 CLOTTING FUNCT ACTIVITY: CPT

## 2022-01-01 PROCEDURE — 85025 COMPLETE CBC W/AUTO DIFF WBC: CPT

## 2022-01-01 PROCEDURE — 700111 HCHG RX REV CODE 636 W/ 250 OVERRIDE (IP): Performed by: EMERGENCY MEDICINE

## 2022-01-01 PROCEDURE — 05HY33Z INSERTION OF INFUSION DEVICE INTO UPPER VEIN, PERCUTANEOUS APPROACH: ICD-10-PCS | Performed by: STUDENT IN AN ORGANIZED HEALTH CARE EDUCATION/TRAINING PROGRAM

## 2022-01-01 PROCEDURE — 80053 COMPREHEN METABOLIC PANEL: CPT

## 2022-01-01 PROCEDURE — 86901 BLOOD TYPING SEROLOGIC RH(D): CPT

## 2022-01-01 PROCEDURE — 90935 HEMODIALYSIS ONE EVALUATION: CPT

## 2022-01-01 PROCEDURE — 99233 SBSQ HOSP IP/OBS HIGH 50: CPT | Performed by: NURSE PRACTITIONER

## 2022-01-01 PROCEDURE — 99498 ADVNCD CARE PLAN ADDL 30 MIN: CPT | Performed by: NURSE PRACTITIONER

## 2022-01-01 PROCEDURE — 99214 OFFICE O/P EST MOD 30 MIN: CPT | Performed by: STUDENT IN AN ORGANIZED HEALTH CARE EDUCATION/TRAINING PROGRAM

## 2022-01-01 PROCEDURE — 81001 URINALYSIS AUTO W/SCOPE: CPT

## 2022-01-01 PROCEDURE — 84155 ASSAY OF PROTEIN SERUM: CPT

## 2022-01-01 PROCEDURE — 99232 SBSQ HOSP IP/OBS MODERATE 35: CPT | Performed by: NURSE PRACTITIONER

## 2022-01-01 PROCEDURE — 85384 FIBRINOGEN ACTIVITY: CPT

## 2022-01-01 PROCEDURE — A9270 NON-COVERED ITEM OR SERVICE: HCPCS | Performed by: HOSPITALIST

## 2022-01-01 PROCEDURE — 82570 ASSAY OF URINE CREATININE: CPT

## 2022-01-01 PROCEDURE — 700102 HCHG RX REV CODE 250 W/ 637 OVERRIDE(OP): Performed by: NURSE PRACTITIONER

## 2022-01-01 PROCEDURE — G0299 HHS/HOSPICE OF RN EA 15 MIN: HCPCS

## 2022-01-01 PROCEDURE — 94760 N-INVAS EAR/PLS OXIMETRY 1: CPT

## 2022-01-01 PROCEDURE — 770004 HCHG ROOM/CARE - ONCOLOGY PRIVATE *

## 2022-01-01 PROCEDURE — 71045 X-RAY EXAM CHEST 1 VIEW: CPT

## 2022-01-01 PROCEDURE — 86038 ANTINUCLEAR ANTIBODIES: CPT

## 2022-01-01 PROCEDURE — 96376 TX/PRO/DX INJ SAME DRUG ADON: CPT

## 2022-01-01 PROCEDURE — 0JH63XZ INSERTION OF TUNNELED VASCULAR ACCESS DEVICE INTO CHEST SUBCUTANEOUS TISSUE AND FASCIA, PERCUTANEOUS APPROACH: ICD-10-PCS | Performed by: STUDENT IN AN ORGANIZED HEALTH CARE EDUCATION/TRAINING PROGRAM

## 2022-01-01 PROCEDURE — 88360 TUMOR IMMUNOHISTOCHEM/MANUAL: CPT

## 2022-01-01 PROCEDURE — 88341 IMHCHEM/IMCYTCHM EA ADD ANTB: CPT | Mod: 91

## 2022-01-01 PROCEDURE — 665036 HSPC NOTICE OF ELECTION NOE

## 2022-01-01 PROCEDURE — 82533 TOTAL CORTISOL: CPT

## 2022-01-01 PROCEDURE — 86255 FLUORESCENT ANTIBODY SCREEN: CPT

## 2022-01-01 PROCEDURE — 99285 EMERGENCY DEPT VISIT HI MDM: CPT

## 2022-01-01 PROCEDURE — 82330 ASSAY OF CALCIUM: CPT

## 2022-01-01 PROCEDURE — 700102 HCHG RX REV CODE 250 W/ 637 OVERRIDE(OP): Performed by: EMERGENCY MEDICINE

## 2022-01-01 PROCEDURE — 700111 HCHG RX REV CODE 636 W/ 250 OVERRIDE (IP)

## 2022-01-01 PROCEDURE — A9270 NON-COVERED ITEM OR SERVICE: HCPCS | Performed by: FAMILY MEDICINE

## 2022-01-01 PROCEDURE — 96374 THER/PROPH/DIAG INJ IV PUSH: CPT

## 2022-01-01 PROCEDURE — 36558 INSERT TUNNELED CV CATH: CPT

## 2022-01-01 PROCEDURE — 700105 HCHG RX REV CODE 258: Performed by: INTERNAL MEDICINE

## 2022-01-01 PROCEDURE — 93306 TTE W/DOPPLER COMPLETE: CPT | Mod: 26 | Performed by: INTERNAL MEDICINE

## 2022-01-01 PROCEDURE — 84300 ASSAY OF URINE SODIUM: CPT

## 2022-01-01 PROCEDURE — 700111 HCHG RX REV CODE 636 W/ 250 OVERRIDE (IP): Performed by: INTERNAL MEDICINE

## 2022-01-01 PROCEDURE — 82306 VITAMIN D 25 HYDROXY: CPT

## 2022-01-01 PROCEDURE — 99497 ADVNCD CARE PLAN 30 MIN: CPT | Performed by: NURSE PRACTITIONER

## 2022-01-01 PROCEDURE — 87389 HIV-1 AG W/HIV-1&-2 AB AG IA: CPT

## 2022-01-01 PROCEDURE — 82340 ASSAY OF CALCIUM IN URINE: CPT

## 2022-01-01 PROCEDURE — 86706 HEP B SURFACE ANTIBODY: CPT

## 2022-01-01 PROCEDURE — 700111 HCHG RX REV CODE 636 W/ 250 OVERRIDE (IP): Performed by: HOSPITALIST

## 2022-01-01 PROCEDURE — 81288 MLH1 GENE: CPT

## 2022-01-01 PROCEDURE — 99232 SBSQ HOSP IP/OBS MODERATE 35: CPT | Performed by: HOSPITALIST

## 2022-01-01 PROCEDURE — 76856 US EXAM PELVIC COMPLETE: CPT

## 2022-01-01 PROCEDURE — 82728 ASSAY OF FERRITIN: CPT

## 2022-01-01 PROCEDURE — 99233 SBSQ HOSP IP/OBS HIGH 50: CPT | Performed by: FAMILY MEDICINE

## 2022-01-01 PROCEDURE — 83010 ASSAY OF HAPTOGLOBIN QUANT: CPT

## 2022-01-01 PROCEDURE — 86304 IMMUNOASSAY TUMOR CA 125: CPT

## 2022-01-01 PROCEDURE — 82962 GLUCOSE BLOOD TEST: CPT

## 2022-01-01 PROCEDURE — 83605 ASSAY OF LACTIC ACID: CPT

## 2022-01-01 PROCEDURE — 84156 ASSAY OF PROTEIN URINE: CPT

## 2022-01-01 PROCEDURE — 700102 HCHG RX REV CODE 250 W/ 637 OVERRIDE(OP): Performed by: FAMILY MEDICINE

## 2022-01-01 PROCEDURE — 99223 1ST HOSP IP/OBS HIGH 75: CPT | Mod: AI | Performed by: HOSPITALIST

## 2022-01-01 PROCEDURE — G0493 RN CARE EA 15 MIN HH/HOSPICE: HCPCS

## 2022-01-01 PROCEDURE — 99233 SBSQ HOSP IP/OBS HIGH 50: CPT | Performed by: INTERNAL MEDICINE

## 2022-01-01 PROCEDURE — 85027 COMPLETE CBC AUTOMATED: CPT

## 2022-01-01 PROCEDURE — 83516 IMMUNOASSAY NONANTIBODY: CPT

## 2022-01-01 PROCEDURE — 88300 SURGICAL PATH GROSS: CPT

## 2022-01-01 PROCEDURE — G0155 HHCP-SVS OF CSW,EA 15 MIN: HCPCS

## 2022-01-01 PROCEDURE — 665001 SOC-HOME HEALTH

## 2022-01-01 PROCEDURE — 83930 ASSAY OF BLOOD OSMOLALITY: CPT

## 2022-01-01 PROCEDURE — 88348 ELECTRON MICROSCOPY DX: CPT

## 2022-01-01 PROCEDURE — 85379 FIBRIN DEGRADATION QUANT: CPT

## 2022-01-01 PROCEDURE — 80061 LIPID PANEL: CPT

## 2022-01-01 PROCEDURE — 76775 US EXAM ABDO BACK WALL LIM: CPT

## 2022-01-01 PROCEDURE — 70450 CT HEAD/BRAIN W/O DYE: CPT

## 2022-01-01 PROCEDURE — RXMED WILLOW AMBULATORY MEDICATION CHARGE: Performed by: NURSE PRACTITIONER

## 2022-01-01 PROCEDURE — 85610 PROTHROMBIN TIME: CPT

## 2022-01-01 PROCEDURE — 07BD3ZX EXCISION OF AORTIC LYMPHATIC, PERCUTANEOUS APPROACH, DIAGNOSTIC: ICD-10-PCS | Performed by: RADIOLOGY

## 2022-01-01 PROCEDURE — 83935 ASSAY OF URINE OSMOLALITY: CPT

## 2022-01-01 PROCEDURE — 84295 ASSAY OF SERUM SODIUM: CPT

## 2022-01-01 PROCEDURE — 87040 BLOOD CULTURE FOR BACTERIA: CPT | Mod: 91

## 2022-01-01 PROCEDURE — 99223 1ST HOSP IP/OBS HIGH 75: CPT | Mod: AI | Performed by: INTERNAL MEDICINE

## 2022-01-01 PROCEDURE — 85046 RETICYTE/HGB CONCENTRATE: CPT

## 2022-01-01 PROCEDURE — G0180 MD CERTIFICATION HHA PATIENT: HCPCS | Performed by: STUDENT IN AN ORGANIZED HEALTH CARE EDUCATION/TRAINING PROGRAM

## 2022-01-01 PROCEDURE — 84145 PROCALCITONIN (PCT): CPT

## 2022-01-01 PROCEDURE — 96375 TX/PRO/DX INJ NEW DRUG ADDON: CPT

## 2022-01-01 PROCEDURE — 99224 PR SUBSEQUENT OBSERVATION CARE,LEVEL I: CPT | Mod: GW | Performed by: INTERNAL MEDICINE

## 2022-01-01 PROCEDURE — 74176 CT ABD & PELVIS W/O CONTRAST: CPT

## 2022-01-01 PROCEDURE — 80076 HEPATIC FUNCTION PANEL: CPT

## 2022-01-01 PROCEDURE — RXMED WILLOW AMBULATORY MEDICATION CHARGE: Performed by: INTERNAL MEDICINE

## 2022-01-01 PROCEDURE — 700105 HCHG RX REV CODE 258: Performed by: NURSE PRACTITIONER

## 2022-01-01 PROCEDURE — 83550 IRON BINDING TEST: CPT

## 2022-01-01 PROCEDURE — 83615 LACTATE (LD) (LDH) ENZYME: CPT

## 2022-01-01 PROCEDURE — 99232 SBSQ HOSP IP/OBS MODERATE 35: CPT | Performed by: INTERNAL MEDICINE

## 2022-01-01 PROCEDURE — 99233 SBSQ HOSP IP/OBS HIGH 50: CPT | Mod: 25 | Performed by: NURSE PRACTITIONER

## 2022-01-01 PROCEDURE — 99225 PR SUBSEQUENT OBSERVATION CARE,LEVEL II: CPT | Mod: GW | Performed by: NURSE PRACTITIONER

## 2022-01-01 PROCEDURE — 87086 URINE CULTURE/COLONY COUNT: CPT

## 2022-01-01 PROCEDURE — 90935 HEMODIALYSIS ONE EVALUATION: CPT | Performed by: INTERNAL MEDICINE

## 2022-01-01 PROCEDURE — 88305 TISSUE EXAM BY PATHOLOGIST: CPT

## 2022-01-01 PROCEDURE — 84439 ASSAY OF FREE THYROXINE: CPT

## 2022-01-01 PROCEDURE — 700111 HCHG RX REV CODE 636 W/ 250 OVERRIDE (IP): Performed by: STUDENT IN AN ORGANIZED HEALTH CARE EDUCATION/TRAINING PROGRAM

## 2022-01-01 PROCEDURE — 93005 ELECTROCARDIOGRAM TRACING: CPT | Performed by: INTERNAL MEDICINE

## 2022-01-01 PROCEDURE — 88313 SPECIAL STAINS GROUP 2: CPT | Mod: 91

## 2022-01-01 PROCEDURE — 99152 MOD SED SAME PHYS/QHP 5/>YRS: CPT

## 2022-01-01 PROCEDURE — 76700 US EXAM ABDOM COMPLETE: CPT

## 2022-01-01 PROCEDURE — G0153 HHCP-SVS OF S/L PATH,EA 15MN: HCPCS

## 2022-01-01 PROCEDURE — A9567 TECHNETIUM TC-99M AEROSOL: HCPCS

## 2022-01-01 PROCEDURE — 0TB03ZX EXCISION OF RIGHT KIDNEY, PERCUTANEOUS APPROACH, DIAGNOSTIC: ICD-10-PCS | Performed by: RADIOLOGY

## 2022-01-01 PROCEDURE — 99223 1ST HOSP IP/OBS HIGH 75: CPT | Performed by: INTERNAL MEDICINE

## 2022-01-01 PROCEDURE — 84165 PROTEIN E-PHORESIS SERUM: CPT

## 2022-01-01 PROCEDURE — 99239 HOSP IP/OBS DSCHRG MGMT >30: CPT | Performed by: INTERNAL MEDICINE

## 2022-01-01 PROCEDURE — 99204 OFFICE O/P NEW MOD 45 MIN: CPT | Performed by: INTERNAL MEDICINE

## 2022-01-01 PROCEDURE — 86480 TB TEST CELL IMMUN MEASURE: CPT

## 2022-01-01 PROCEDURE — 99204 OFFICE O/P NEW MOD 45 MIN: CPT | Performed by: STUDENT IN AN ORGANIZED HEALTH CARE EDUCATION/TRAINING PROGRAM

## 2022-01-01 PROCEDURE — 88360 TUMOR IMMUNOHISTOCHEM/MANUAL: CPT | Mod: 91

## 2022-01-01 PROCEDURE — 86225 DNA ANTIBODY NATIVE: CPT

## 2022-01-01 PROCEDURE — 99217 PR OBSERVATION CARE DISCHARGE: CPT | Mod: GW | Performed by: INTERNAL MEDICINE

## 2022-01-01 PROCEDURE — 99284 EMERGENCY DEPT VISIT MOD MDM: CPT

## 2022-01-01 PROCEDURE — 80074 ACUTE HEPATITIS PANEL: CPT

## 2022-01-01 PROCEDURE — 93005 ELECTROCARDIOGRAM TRACING: CPT

## 2022-01-01 PROCEDURE — 93970 EXTREMITY STUDY: CPT

## 2022-01-01 PROCEDURE — 86160 COMPLEMENT ANTIGEN: CPT

## 2022-01-01 PROCEDURE — 80048 BASIC METABOLIC PNL TOTAL CA: CPT | Mod: 91

## 2022-01-01 PROCEDURE — 87077 CULTURE AEROBIC IDENTIFY: CPT

## 2022-01-01 PROCEDURE — 83036 HEMOGLOBIN GLYCOSYLATED A1C: CPT

## 2022-01-01 PROCEDURE — 99213 OFFICE O/P EST LOW 20 MIN: CPT | Performed by: STUDENT IN AN ORGANIZED HEALTH CARE EDUCATION/TRAINING PROGRAM

## 2022-01-01 PROCEDURE — 86039 ANTINUCLEAR ANTIBODIES (ANA): CPT

## 2022-01-01 PROCEDURE — 86850 RBC ANTIBODY SCREEN: CPT

## 2022-01-01 PROCEDURE — 85730 THROMBOPLASTIN TIME PARTIAL: CPT

## 2022-01-01 PROCEDURE — 88350 IMFLUOR EA ADDL 1ANTB STN PX: CPT | Mod: 91

## 2022-01-01 PROCEDURE — 93306 TTE W/DOPPLER COMPLETE: CPT

## 2022-01-01 PROCEDURE — 90935 HEMODIALYSIS ONE EVALUATION: CPT | Mod: GZ | Performed by: INTERNAL MEDICINE

## 2022-01-01 PROCEDURE — A9270 NON-COVERED ITEM OR SERVICE: HCPCS | Performed by: EMERGENCY MEDICINE

## 2022-01-01 PROCEDURE — 99220 PR INITIAL OBSERVATION CARE,LEVL III: CPT | Mod: GW | Performed by: HOSPITALIST

## 2022-01-01 PROCEDURE — G0156 HHCP-SVS OF AIDE,EA 15 MIN: HCPCS

## 2022-01-01 PROCEDURE — 83970 ASSAY OF PARATHORMONE: CPT

## 2022-01-01 PROCEDURE — 83540 ASSAY OF IRON: CPT

## 2022-01-01 PROCEDURE — 5A1D70Z PERFORMANCE OF URINARY FILTRATION, INTERMITTENT, LESS THAN 6 HOURS PER DAY: ICD-10-PCS | Performed by: INTERNAL MEDICINE

## 2022-01-01 PROCEDURE — 87186 SC STD MICRODIL/AGAR DIL: CPT

## 2022-01-01 PROCEDURE — 93010 ELECTROCARDIOGRAM REPORT: CPT | Performed by: INTERNAL MEDICINE

## 2022-01-01 PROCEDURE — 86235 NUCLEAR ANTIGEN ANTIBODY: CPT | Mod: 91

## 2022-01-01 PROCEDURE — 87493 C DIFF AMPLIFIED PROBE: CPT

## 2022-01-01 PROCEDURE — 85520 HEPARIN ASSAY: CPT

## 2022-01-01 RX ORDER — HEPARIN SODIUM 1000 [USP'U]/ML
INJECTION, SOLUTION INTRAVENOUS; SUBCUTANEOUS
Status: COMPLETED
Start: 2022-01-01 | End: 2022-01-01

## 2022-01-01 RX ORDER — BISACODYL 10 MG
10 SUPPOSITORY, RECTAL RECTAL
Status: DISCONTINUED | OUTPATIENT
Start: 2022-01-01 | End: 2022-01-01 | Stop reason: HOSPADM

## 2022-01-01 RX ORDER — FERROUS SULFATE 325(65) MG
325 TABLET ORAL
Qty: 30 TABLET | Refills: 0 | Status: SHIPPED | OUTPATIENT
Start: 2022-01-01 | End: 2022-01-01

## 2022-01-01 RX ORDER — VITAMIN B COMPLEX
1000 TABLET ORAL DAILY
Status: CANCELLED | OUTPATIENT
Start: 2022-01-01

## 2022-01-01 RX ORDER — VITAMIN B COMPLEX
1000 TABLET ORAL DAILY
Status: DISCONTINUED | OUTPATIENT
Start: 2022-01-01 | End: 2022-01-01 | Stop reason: HOSPADM

## 2022-01-01 RX ORDER — OXYCODONE HCL 20 MG/ML
5 CONCENTRATE, ORAL ORAL
Qty: 120 ML | Refills: 0 | Status: ON HOLD | OUTPATIENT
Start: 2022-01-01 | End: 2022-01-01

## 2022-01-01 RX ORDER — ACETAMINOPHEN 325 MG/1
650 TABLET ORAL EVERY 6 HOURS PRN
Status: DISCONTINUED | OUTPATIENT
Start: 2022-01-01 | End: 2022-01-01 | Stop reason: HOSPADM

## 2022-01-01 RX ORDER — AMOXICILLIN 250 MG
2 CAPSULE ORAL 2 TIMES DAILY
Status: DISCONTINUED | OUTPATIENT
Start: 2022-01-01 | End: 2022-01-01 | Stop reason: HOSPADM

## 2022-01-01 RX ORDER — ONDANSETRON 2 MG/ML
4 INJECTION INTRAMUSCULAR; INTRAVENOUS EVERY 4 HOURS PRN
Status: CANCELLED | OUTPATIENT
Start: 2022-01-01

## 2022-01-01 RX ORDER — SODIUM CHLORIDE 1 G/1
2 TABLET ORAL ONCE
Status: COMPLETED | OUTPATIENT
Start: 2022-01-01 | End: 2022-01-01

## 2022-01-01 RX ORDER — CARVEDILOL 3.12 MG/1
3.12 TABLET ORAL 2 TIMES DAILY WITH MEALS
Status: CANCELLED | OUTPATIENT
Start: 2022-01-01

## 2022-01-01 RX ORDER — SODIUM BICARBONATE 650 MG/1
325 TABLET ORAL 2 TIMES DAILY
Status: CANCELLED | OUTPATIENT
Start: 2022-01-01

## 2022-01-01 RX ORDER — SODIUM BICARBONATE 325 MG/1
325 TABLET ORAL 2 TIMES DAILY
Qty: 180 TABLET | Refills: 2 | Status: ON HOLD | OUTPATIENT
Start: 2022-01-01 | End: 2022-01-01

## 2022-01-01 RX ORDER — BISACODYL 10 MG
SUPPOSITORY, RECTAL RECTAL
Qty: 5 SUPPOSITORY | Refills: 10 | Status: ON HOLD | OUTPATIENT
Start: 2022-01-01 | End: 2022-01-01

## 2022-01-01 RX ORDER — IBUPROFEN 200 MG
950 CAPSULE ORAL DAILY
Status: DISCONTINUED | OUTPATIENT
Start: 2022-01-01 | End: 2022-01-01 | Stop reason: HOSPADM

## 2022-01-01 RX ORDER — CARVEDILOL 3.12 MG/1
3.12 TABLET ORAL 2 TIMES DAILY WITH MEALS
Qty: 60 TABLET | Refills: 0 | Status: SHIPPED | OUTPATIENT
Start: 2022-01-01 | End: 2022-01-01 | Stop reason: SDUPTHER

## 2022-01-01 RX ORDER — ACETAMINOPHEN 500 MG
500 TABLET ORAL EVERY 4 HOURS
Qty: 84 TABLET | Refills: 10 | Status: ON HOLD | OUTPATIENT
Start: 2022-01-01 | End: 2022-01-01

## 2022-01-01 RX ORDER — CARVEDILOL 3.12 MG/1
3.12 TABLET ORAL 2 TIMES DAILY WITH MEALS
Qty: 60 TABLET | Refills: 0 | Status: SHIPPED | OUTPATIENT
Start: 2022-01-01 | End: 2022-01-01

## 2022-01-01 RX ORDER — SODIUM CHLORIDE, SODIUM LACTATE, POTASSIUM CHLORIDE, AND CALCIUM CHLORIDE .6; .31; .03; .02 G/100ML; G/100ML; G/100ML; G/100ML
500 INJECTION, SOLUTION INTRAVENOUS ONCE
Status: COMPLETED | OUTPATIENT
Start: 2022-01-01 | End: 2022-01-01

## 2022-01-01 RX ORDER — LABETALOL HYDROCHLORIDE 5 MG/ML
10 INJECTION, SOLUTION INTRAVENOUS EVERY 4 HOURS PRN
Status: DISCONTINUED | OUTPATIENT
Start: 2022-01-01 | End: 2022-01-01 | Stop reason: HOSPADM

## 2022-01-01 RX ORDER — FLUMAZENIL 0.1 MG/ML
INJECTION INTRAVENOUS
Status: COMPLETED
Start: 2022-01-01 | End: 2022-01-01

## 2022-01-01 RX ORDER — LIDOCAINE HYDROCHLORIDE AND EPINEPHRINE BITARTRATE 20; .01 MG/ML; MG/ML
INJECTION, SOLUTION SUBCUTANEOUS
Status: DISPENSED
Start: 2022-01-01 | End: 2022-01-01

## 2022-01-01 RX ORDER — AMOXICILLIN 250 MG
2 CAPSULE ORAL 2 TIMES DAILY
Qty: 56 TABLET | Refills: 10 | Status: ON HOLD | OUTPATIENT
Start: 2022-01-01 | End: 2022-12-22

## 2022-01-01 RX ORDER — POTASSIUM CHLORIDE 20 MEQ/1
40 TABLET, EXTENDED RELEASE ORAL DAILY
Status: DISCONTINUED | OUTPATIENT
Start: 2022-01-01 | End: 2022-01-01

## 2022-01-01 RX ORDER — POLYETHYLENE GLYCOL 3350 17 G/17G
1 POWDER, FOR SOLUTION ORAL
Status: DISCONTINUED | OUTPATIENT
Start: 2022-01-01 | End: 2022-01-01 | Stop reason: HOSPADM

## 2022-01-01 RX ORDER — SODIUM BICARBONATE 650 MG/1
325 TABLET ORAL 2 TIMES DAILY
Status: DISCONTINUED | OUTPATIENT
Start: 2022-01-01 | End: 2022-01-01 | Stop reason: HOSPADM

## 2022-01-01 RX ORDER — FERROUS SULFATE 325(65) MG
325 TABLET ORAL
Qty: 30 TABLET | Refills: 0 | OUTPATIENT
Start: 2022-01-01

## 2022-01-01 RX ORDER — MORPHINE SULFATE 4 MG/ML
2 INJECTION INTRAVENOUS
Status: DISCONTINUED | OUTPATIENT
Start: 2022-01-01 | End: 2022-01-01

## 2022-01-01 RX ORDER — LIDOCAINE HYDROCHLORIDE 10 MG/ML
INJECTION, SOLUTION EPIDURAL; INFILTRATION; INTRACAUDAL; PERINEURAL
Status: COMPLETED
Start: 2022-01-01 | End: 2022-01-01

## 2022-01-01 RX ORDER — METOPROLOL SUCCINATE 25 MG/1
25 TABLET, EXTENDED RELEASE ORAL
Status: DISCONTINUED | OUTPATIENT
Start: 2022-01-01 | End: 2022-01-01

## 2022-01-01 RX ORDER — CEFTRIAXONE 2 G/1
2 INJECTION, POWDER, FOR SOLUTION INTRAMUSCULAR; INTRAVENOUS ONCE
Status: COMPLETED | OUTPATIENT
Start: 2022-01-01 | End: 2022-01-01

## 2022-01-01 RX ORDER — ONDANSETRON 2 MG/ML
4 INJECTION INTRAMUSCULAR; INTRAVENOUS EVERY 6 HOURS PRN
Status: ACTIVE | OUTPATIENT
Start: 2022-01-01 | End: 2022-01-01

## 2022-01-01 RX ORDER — FUROSEMIDE 80 MG
80 TABLET ORAL 2 TIMES DAILY
Qty: 28 TABLET | Refills: 10 | Status: ON HOLD | OUTPATIENT
Start: 2022-01-01 | End: 2022-01-01

## 2022-01-01 RX ORDER — LORAZEPAM 2 MG/ML
1 CONCENTRATE ORAL
Qty: 360 ML | Refills: 0
Start: 2022-01-01 | End: 2022-01-01

## 2022-01-01 RX ORDER — MIDAZOLAM HYDROCHLORIDE 1 MG/ML
INJECTION INTRAMUSCULAR; INTRAVENOUS
Status: COMPLETED
Start: 2022-01-01 | End: 2022-01-01

## 2022-01-01 RX ORDER — FERROUS SULFATE 325(65) MG
325 TABLET ORAL
Status: DISCONTINUED | OUTPATIENT
Start: 2022-01-01 | End: 2022-01-01 | Stop reason: HOSPADM

## 2022-01-01 RX ORDER — LORAZEPAM 2 MG/ML
0.5 CONCENTRATE ORAL EVERY 4 HOURS PRN
Qty: 120 ML | Refills: 0 | Status: ON HOLD | OUTPATIENT
Start: 2022-01-01 | End: 2022-01-01

## 2022-01-01 RX ORDER — OXYCODONE HCL 20 MG/ML
15 CONCENTRATE, ORAL ORAL EVERY 4 HOURS
Qty: 240 ML | Refills: 0
Start: 2022-01-01 | End: 2022-01-01

## 2022-01-01 RX ORDER — SULFAMETHOXAZOLE AND TRIMETHOPRIM 800; 160 MG/1; MG/1
0.5 TABLET ORAL 2 TIMES DAILY
Qty: 3 TABLET | Refills: 0 | Status: SHIPPED | OUTPATIENT
Start: 2022-01-01 | End: 2022-01-01 | Stop reason: SDUPTHER

## 2022-01-01 RX ORDER — ACETAMINOPHEN 325 MG/1
650 TABLET ORAL EVERY 6 HOURS PRN
Status: CANCELLED | OUTPATIENT
Start: 2022-01-01

## 2022-01-01 RX ORDER — MIDAZOLAM HYDROCHLORIDE 1 MG/ML
.5-2 INJECTION INTRAMUSCULAR; INTRAVENOUS PRN
Status: ACTIVE | OUTPATIENT
Start: 2022-01-01 | End: 2022-01-01

## 2022-01-01 RX ORDER — HEPARIN SODIUM (PORCINE) LOCK FLUSH IV SOLN 100 UNIT/ML 100 UNIT/ML
SOLUTION INTRAVENOUS
Status: DISPENSED
Start: 2022-01-01 | End: 2022-01-01

## 2022-01-01 RX ORDER — FUROSEMIDE 10 MG/ML
40 INJECTION INTRAMUSCULAR; INTRAVENOUS 2 TIMES DAILY
Status: DISCONTINUED | OUTPATIENT
Start: 2022-01-01 | End: 2022-01-01

## 2022-01-01 RX ORDER — TRAZODONE HYDROCHLORIDE 100 MG/1
100 TABLET ORAL
Qty: 90 TABLET | Refills: 2 | Status: SHIPPED | OUTPATIENT
Start: 2022-01-01 | End: 2022-01-01

## 2022-01-01 RX ORDER — ACETAMINOPHEN 650 MG/1
650 SUPPOSITORY RECTAL EVERY 6 HOURS
Qty: 5 SUPPOSITORY | Refills: 10 | Status: ON HOLD | OUTPATIENT
Start: 2022-01-01 | End: 2022-01-01

## 2022-01-01 RX ORDER — HEPARIN SODIUM 1000 [USP'U]/ML
3200 INJECTION, SOLUTION INTRAVENOUS; SUBCUTANEOUS
Status: DISCONTINUED | OUTPATIENT
Start: 2022-01-01 | End: 2022-01-01 | Stop reason: HOSPADM

## 2022-01-01 RX ORDER — FERROUS SULFATE 325(65) MG
325 TABLET ORAL
Qty: 30 TABLET | Refills: 0 | Status: SHIPPED | OUTPATIENT
Start: 2022-01-01 | End: 2022-01-01 | Stop reason: SDUPTHER

## 2022-01-01 RX ORDER — MORPHINE SULFATE 15 MG/1
7.5 TABLET ORAL ONCE
Status: COMPLETED | OUTPATIENT
Start: 2022-01-01 | End: 2022-01-01

## 2022-01-01 RX ORDER — SODIUM CHLORIDE 9 MG/ML
250 INJECTION, SOLUTION INTRAVENOUS
Status: DISCONTINUED | OUTPATIENT
Start: 2022-01-01 | End: 2022-01-01 | Stop reason: HOSPADM

## 2022-01-01 RX ORDER — FUROSEMIDE 10 MG/ML
80 INJECTION INTRAMUSCULAR; INTRAVENOUS
Status: DISCONTINUED | OUTPATIENT
Start: 2022-01-01 | End: 2022-01-01 | Stop reason: HOSPADM

## 2022-01-01 RX ORDER — CARVEDILOL 3.12 MG/1
3.12 TABLET ORAL 2 TIMES DAILY WITH MEALS
Qty: 60 TABLET | Refills: 0 | Status: ON HOLD | OUTPATIENT
Start: 2022-01-01 | End: 2022-01-01

## 2022-01-01 RX ORDER — ATROPINE SULFATE 10 MG/ML
2 SOLUTION/ DROPS OPHTHALMIC EVERY 4 HOURS PRN
Status: DISCONTINUED | OUTPATIENT
Start: 2022-01-01 | End: 2022-01-01 | Stop reason: HOSPADM

## 2022-01-01 RX ORDER — MELATONIN
Qty: 90 TABLET | Refills: 1 | Status: SHIPPED | OUTPATIENT
Start: 2022-01-01 | End: 2022-01-01

## 2022-01-01 RX ORDER — FUROSEMIDE 10 MG/ML
40 INJECTION INTRAMUSCULAR; INTRAVENOUS ONCE
Status: COMPLETED | OUTPATIENT
Start: 2022-01-01 | End: 2022-01-01

## 2022-01-01 RX ORDER — CARVEDILOL 3.12 MG/1
3.12 TABLET ORAL 2 TIMES DAILY WITH MEALS
Status: DISCONTINUED | OUTPATIENT
Start: 2022-01-01 | End: 2022-01-01 | Stop reason: HOSPADM

## 2022-01-01 RX ORDER — ONDANSETRON 2 MG/ML
4 INJECTION INTRAMUSCULAR; INTRAVENOUS EVERY 4 HOURS PRN
Status: DISCONTINUED | OUTPATIENT
Start: 2022-01-01 | End: 2022-01-01 | Stop reason: HOSPADM

## 2022-01-01 RX ORDER — OXYCODONE HCL 20 MG/ML
15 CONCENTRATE, ORAL ORAL
Qty: 240 ML | Refills: 0
Start: 2022-01-01 | End: 2022-01-01

## 2022-01-01 RX ORDER — CALCIUM ACETATE 667 MG/1
667 TABLET ORAL
Qty: 180 TABLET | Refills: 0 | Status: ON HOLD | OUTPATIENT
Start: 2022-01-01 | End: 2022-01-01

## 2022-01-01 RX ORDER — ONDANSETRON 4 MG/1
4 TABLET, ORALLY DISINTEGRATING ORAL EVERY 4 HOURS PRN
Status: CANCELLED | OUTPATIENT
Start: 2022-01-01

## 2022-01-01 RX ORDER — VITAMIN B COMPLEX
1000 TABLET ORAL DAILY
Refills: 1 | Status: DISCONTINUED | OUTPATIENT
Start: 2022-01-01 | End: 2022-01-01 | Stop reason: HOSPADM

## 2022-01-01 RX ORDER — TRAZODONE HYDROCHLORIDE 50 MG/1
100 TABLET ORAL NIGHTLY
Status: DISCONTINUED | OUTPATIENT
Start: 2022-01-01 | End: 2022-01-01 | Stop reason: HOSPADM

## 2022-01-01 RX ORDER — MORPHINE SULFATE 4 MG/ML
1-5 INJECTION INTRAVENOUS
Status: DISCONTINUED | OUTPATIENT
Start: 2022-01-01 | End: 2022-01-01

## 2022-01-01 RX ORDER — FUROSEMIDE 10 MG/ML
40 INJECTION INTRAMUSCULAR; INTRAVENOUS DAILY
Status: DISCONTINUED | OUTPATIENT
Start: 2022-01-01 | End: 2022-01-01 | Stop reason: HOSPADM

## 2022-01-01 RX ORDER — LORAZEPAM 0.5 MG/1
0.5 TABLET ORAL EVERY 4 HOURS PRN
Status: DISCONTINUED | OUTPATIENT
Start: 2022-01-01 | End: 2022-01-01

## 2022-01-01 RX ORDER — NALOXONE HYDROCHLORIDE 0.4 MG/ML
INJECTION, SOLUTION INTRAMUSCULAR; INTRAVENOUS; SUBCUTANEOUS
Status: COMPLETED
Start: 2022-01-01 | End: 2022-01-01

## 2022-01-01 RX ORDER — SODIUM BICARBONATE 650 MG/1
325 TABLET ORAL 2 TIMES DAILY
Status: DISCONTINUED | OUTPATIENT
Start: 2022-01-01 | End: 2022-01-01

## 2022-01-01 RX ORDER — OXYCODONE HCL 20 MG/ML
15 CONCENTRATE, ORAL ORAL EVERY 4 HOURS
Qty: 240 ML | Refills: 0
Start: 2022-01-01 | End: 2023-12-20

## 2022-01-01 RX ORDER — ONDANSETRON 4 MG/1
4 TABLET, ORALLY DISINTEGRATING ORAL EVERY 4 HOURS
Qty: 84 TABLET | Refills: 10 | Status: ON HOLD | OUTPATIENT
Start: 2022-01-01 | End: 2022-01-01

## 2022-01-01 RX ORDER — SODIUM CHLORIDE 1 G/1
1 TABLET ORAL 3 TIMES DAILY
Qty: 90 TABLET | Refills: 1 | Status: SHIPPED | OUTPATIENT
Start: 2022-01-01 | End: 2022-01-01

## 2022-01-01 RX ORDER — TRAZODONE HYDROCHLORIDE 100 MG/1
100 TABLET ORAL
Qty: 14 TABLET | Refills: 10 | Status: ON HOLD | OUTPATIENT
Start: 2022-01-01 | End: 2022-01-01

## 2022-01-01 RX ORDER — SODIUM BICARBONATE 650 MG/1
1300 TABLET ORAL 2 TIMES DAILY
Status: DISCONTINUED | OUTPATIENT
Start: 2022-01-01 | End: 2022-01-01

## 2022-01-01 RX ORDER — ONDANSETRON 4 MG/1
4 TABLET, ORALLY DISINTEGRATING ORAL EVERY 4 HOURS PRN
Status: DISCONTINUED | OUTPATIENT
Start: 2022-01-01 | End: 2022-01-01 | Stop reason: HOSPADM

## 2022-01-01 RX ORDER — CARVEDILOL 3.12 MG/1
3.12 TABLET ORAL 2 TIMES DAILY WITH MEALS
Status: DISCONTINUED | OUTPATIENT
Start: 2022-01-01 | End: 2022-01-01

## 2022-01-01 RX ORDER — HEPARIN SODIUM 5000 [USP'U]/ML
5000 INJECTION, SOLUTION INTRAVENOUS; SUBCUTANEOUS EVERY 8 HOURS
Status: DISCONTINUED | OUTPATIENT
Start: 2022-01-01 | End: 2022-01-01

## 2022-01-01 RX ORDER — SODIUM CHLORIDE 9 MG/ML
500 INJECTION, SOLUTION INTRAVENOUS
Status: ACTIVE | OUTPATIENT
Start: 2022-01-01 | End: 2022-01-01

## 2022-01-01 RX ORDER — FUROSEMIDE 10 MG/ML
40 INJECTION INTRAMUSCULAR; INTRAVENOUS
Status: DISCONTINUED | OUTPATIENT
Start: 2022-01-01 | End: 2022-01-01

## 2022-01-01 RX ORDER — SODIUM CHLORIDE 1 G/1
1 TABLET ORAL 3 TIMES DAILY
Qty: 90 TABLET | Refills: 1 | Status: SHIPPED | OUTPATIENT
Start: 2022-01-01 | End: 2022-01-01 | Stop reason: SDUPTHER

## 2022-01-01 RX ORDER — CALCIUM ACETATE 667 MG/1
667 TABLET ORAL
Qty: 180 TABLET | Refills: 0 | Status: SHIPPED | OUTPATIENT
Start: 2022-01-01 | End: 2022-01-01 | Stop reason: SDUPTHER

## 2022-01-01 RX ORDER — CALCIUM ACETATE 667 MG/1
667 TABLET ORAL
Qty: 60 TABLET | Refills: 0 | OUTPATIENT
Start: 2022-01-01

## 2022-01-01 RX ORDER — TRAZODONE HYDROCHLORIDE 100 MG/1
100 TABLET ORAL NIGHTLY
Qty: 90 TABLET | Refills: 2 | Status: SHIPPED | OUTPATIENT
Start: 2022-01-01 | End: 2022-01-01 | Stop reason: SDUPTHER

## 2022-01-01 RX ORDER — FUROSEMIDE 10 MG/ML
20 INJECTION INTRAMUSCULAR; INTRAVENOUS
Status: DISCONTINUED | OUTPATIENT
Start: 2022-01-01 | End: 2022-01-01

## 2022-01-01 RX ORDER — CALCIUM ACETATE 667 MG/1
667 TABLET ORAL
Status: DISCONTINUED | OUTPATIENT
Start: 2022-01-01 | End: 2022-01-01 | Stop reason: HOSPADM

## 2022-01-01 RX ORDER — TRAZODONE HYDROCHLORIDE 100 MG/1
100 TABLET ORAL NIGHTLY
Status: DISCONTINUED | OUTPATIENT
Start: 2022-01-01 | End: 2022-01-01 | Stop reason: HOSPADM

## 2022-01-01 RX ORDER — FUROSEMIDE 10 MG/ML
20 INJECTION INTRAMUSCULAR; INTRAVENOUS ONCE
Status: COMPLETED | OUTPATIENT
Start: 2022-01-01 | End: 2022-01-01

## 2022-01-01 RX ORDER — FUROSEMIDE 80 MG
80 TABLET ORAL 2 TIMES DAILY
Qty: 30 TABLET | Refills: 0 | OUTPATIENT
Start: 2022-01-01

## 2022-01-01 RX ORDER — FUROSEMIDE 80 MG
80 TABLET ORAL 2 TIMES DAILY
Qty: 30 TABLET | Refills: 0 | Status: SHIPPED | OUTPATIENT
Start: 2022-01-01 | End: 2022-01-01

## 2022-01-01 RX ORDER — FUROSEMIDE 40 MG/1
80 TABLET ORAL
Status: DISCONTINUED | OUTPATIENT
Start: 2022-01-01 | End: 2022-01-01 | Stop reason: HOSPADM

## 2022-01-01 RX ORDER — ONDANSETRON 2 MG/ML
4 INJECTION INTRAMUSCULAR; INTRAVENOUS PRN
Status: ACTIVE | OUTPATIENT
Start: 2022-01-01 | End: 2022-01-01

## 2022-01-01 RX ORDER — FUROSEMIDE 80 MG
80 TABLET ORAL 2 TIMES DAILY
Qty: 30 TABLET | Refills: 0 | Status: SHIPPED | OUTPATIENT
Start: 2022-01-01 | End: 2022-01-01 | Stop reason: SDUPTHER

## 2022-01-01 RX ORDER — POTASSIUM CHLORIDE 20 MEQ/1
40 TABLET, EXTENDED RELEASE ORAL DAILY
Status: DISCONTINUED | OUTPATIENT
Start: 2022-01-01 | End: 2022-01-01 | Stop reason: HOSPADM

## 2022-01-01 RX ORDER — TRAZODONE HYDROCHLORIDE 100 MG/1
100 TABLET ORAL NIGHTLY
COMMUNITY
End: 2022-01-01 | Stop reason: SDUPTHER

## 2022-01-01 RX ORDER — SODIUM CHLORIDE, SODIUM LACTATE, POTASSIUM CHLORIDE, CALCIUM CHLORIDE 600; 310; 30; 20 MG/100ML; MG/100ML; MG/100ML; MG/100ML
INJECTION, SOLUTION INTRAVENOUS CONTINUOUS
Status: DISCONTINUED | OUTPATIENT
Start: 2022-01-01 | End: 2022-01-01

## 2022-01-01 RX ORDER — CARVEDILOL 3.12 MG/1
3.12 TABLET ORAL 2 TIMES DAILY WITH MEALS
Qty: 28 TABLET | Refills: 10 | Status: ON HOLD | OUTPATIENT
Start: 2022-01-01 | End: 2022-01-01

## 2022-01-01 RX ORDER — SULFAMETHOXAZOLE AND TRIMETHOPRIM 800; 160 MG/1; MG/1
0.5 TABLET ORAL 2 TIMES DAILY
Qty: 3 TABLET | Refills: 0 | Status: ON HOLD | OUTPATIENT
Start: 2022-01-01 | End: 2022-01-01

## 2022-01-01 RX ORDER — LORAZEPAM 2 MG/ML
1 CONCENTRATE ORAL
Qty: 360 ML | Refills: 0
Start: 2022-01-01 | End: 2023-12-20

## 2022-01-01 RX ORDER — OXYCODONE HCL 20 MG/ML
15 CONCENTRATE, ORAL ORAL
Qty: 240 ML | Refills: 0
Start: 2022-01-01 | End: 2023-12-20

## 2022-01-01 RX ORDER — MORPHINE SULFATE 15 MG/1
7.5 TABLET ORAL EVERY 8 HOURS PRN
Qty: 10 TABLET | Refills: 0 | Status: SHIPPED | OUTPATIENT
Start: 2022-01-01 | End: 2022-01-01

## 2022-01-01 RX ORDER — CARVEDILOL 3.12 MG/1
3.12 TABLET ORAL 2 TIMES DAILY WITH MEALS
Qty: 60 TABLET | Refills: 0 | OUTPATIENT
Start: 2022-01-01 | End: 2022-01-01

## 2022-01-01 RX ORDER — LORAZEPAM 2 MG/ML
1 CONCENTRATE ORAL EVERY 4 HOURS
Qty: 360 ML | Refills: 0
Start: 2022-01-01 | End: 2023-12-20

## 2022-01-01 RX ORDER — TRAZODONE HYDROCHLORIDE 50 MG/1
100 TABLET ORAL NIGHTLY
Status: CANCELLED | OUTPATIENT
Start: 2022-01-01

## 2022-01-01 RX ADMIN — Medication 667 MG: at 17:25

## 2022-01-01 RX ADMIN — CARVEDILOL 3.12 MG: 6.25 TABLET, FILM COATED ORAL at 17:21

## 2022-01-01 RX ADMIN — FERROUS SULFATE TAB 325 MG (65 MG ELEMENTAL FE) 325 MG: 325 (65 FE) TAB at 09:01

## 2022-01-01 RX ADMIN — CEFTRIAXONE SODIUM 2 G: 2 INJECTION, POWDER, FOR SOLUTION INTRAMUSCULAR; INTRAVENOUS at 22:39

## 2022-01-01 RX ADMIN — CARVEDILOL 3.12 MG: 6.25 TABLET, FILM COATED ORAL at 09:06

## 2022-01-01 RX ADMIN — FUROSEMIDE 20 MG: 10 INJECTION, SOLUTION INTRAMUSCULAR; INTRAVENOUS at 05:46

## 2022-01-01 RX ADMIN — ONDANSETRON 4 MG: 2 INJECTION INTRAMUSCULAR; INTRAVENOUS at 06:36

## 2022-01-01 RX ADMIN — Medication 667 MG: at 17:52

## 2022-01-01 RX ADMIN — TRAZODONE HYDROCHLORIDE 100 MG: 100 TABLET ORAL at 21:00

## 2022-01-01 RX ADMIN — TRAZODONE HYDROCHLORIDE 100 MG: 50 TABLET ORAL at 20:57

## 2022-01-01 RX ADMIN — Medication 667 MG: at 13:46

## 2022-01-01 RX ADMIN — CARVEDILOL 3.12 MG: 6.25 TABLET, FILM COATED ORAL at 07:42

## 2022-01-01 RX ADMIN — Medication 1000 UNITS: at 05:07

## 2022-01-01 RX ADMIN — Medication 1000 UNITS: at 05:44

## 2022-01-01 RX ADMIN — MORPHINE SULFATE 2 MG: 4 INJECTION, SOLUTION INTRAMUSCULAR; INTRAVENOUS at 01:52

## 2022-01-01 RX ADMIN — Medication 667 MG: at 07:50

## 2022-01-01 RX ADMIN — HEPARIN SODIUM 3200 UNITS: 1000 INJECTION, SOLUTION INTRAVENOUS; SUBCUTANEOUS at 11:21

## 2022-01-01 RX ADMIN — CARVEDILOL 3.12 MG: 6.25 TABLET, FILM COATED ORAL at 18:31

## 2022-01-01 RX ADMIN — MIDAZOLAM HYDROCHLORIDE 2 MG: 1 INJECTION, SOLUTION INTRAMUSCULAR; INTRAVENOUS at 14:17

## 2022-01-01 RX ADMIN — SODIUM BICARBONATE 325 MG: 650 TABLET ORAL at 06:13

## 2022-01-01 RX ADMIN — Medication 667 MG: at 12:04

## 2022-01-01 RX ADMIN — TRAZODONE HYDROCHLORIDE 100 MG: 50 TABLET ORAL at 00:46

## 2022-01-01 RX ADMIN — CARVEDILOL 3.12 MG: 3.12 TABLET, FILM COATED ORAL at 17:33

## 2022-01-01 RX ADMIN — MORPHINE SULFATE 1 MG: 4 INJECTION INTRAVENOUS at 15:35

## 2022-01-01 RX ADMIN — POLYETHYLENE GLYCOL 3350, SODIUM SULFATE ANHYDROUS, SODIUM BICARBONATE, SODIUM CHLORIDE, POTASSIUM CHLORIDE 4 L: 236; 22.74; 6.74; 5.86; 2.97 POWDER, FOR SOLUTION ORAL at 14:53

## 2022-01-01 RX ADMIN — SODIUM BICARBONATE 1300 MG: 650 TABLET ORAL at 05:19

## 2022-01-01 RX ADMIN — POTASSIUM CHLORIDE 40 MEQ: 1500 TABLET, EXTENDED RELEASE ORAL at 05:34

## 2022-01-01 RX ADMIN — CARVEDILOL 3.12 MG: 3.12 TABLET, FILM COATED ORAL at 08:06

## 2022-01-01 RX ADMIN — FUROSEMIDE 80 MG: 10 INJECTION, SOLUTION INTRAMUSCULAR; INTRAVENOUS at 09:12

## 2022-01-01 RX ADMIN — TRAZODONE HYDROCHLORIDE 100 MG: 50 TABLET ORAL at 20:46

## 2022-01-01 RX ADMIN — MORPHINE SULFATE 2 MG: 4 INJECTION, SOLUTION INTRAMUSCULAR; INTRAVENOUS at 13:03

## 2022-01-01 RX ADMIN — MORPHINE SULFATE 2 MG: 4 INJECTION, SOLUTION INTRAMUSCULAR; INTRAVENOUS at 00:44

## 2022-01-01 RX ADMIN — FERROUS SULFATE TAB 325 MG (65 MG ELEMENTAL FE) 325 MG: 325 (65 FE) TAB at 08:27

## 2022-01-01 RX ADMIN — MORPHINE SULFATE 2 MG: 4 INJECTION, SOLUTION INTRAMUSCULAR; INTRAVENOUS at 07:14

## 2022-01-01 RX ADMIN — MORPHINE SULFATE 2 MG: 4 INJECTION, SOLUTION INTRAMUSCULAR; INTRAVENOUS at 15:32

## 2022-01-01 RX ADMIN — CARVEDILOL 3.12 MG: 3.12 TABLET, FILM COATED ORAL at 17:59

## 2022-01-01 RX ADMIN — Medication 667 MG: at 08:44

## 2022-01-01 RX ADMIN — ACETAMINOPHEN 650 MG: 325 TABLET, FILM COATED ORAL at 05:25

## 2022-01-01 RX ADMIN — Medication 667 MG: at 17:00

## 2022-01-01 RX ADMIN — Medication 1000 UNITS: at 06:16

## 2022-01-01 RX ADMIN — ONDANSETRON 4 MG: 2 INJECTION INTRAMUSCULAR; INTRAVENOUS at 13:08

## 2022-01-01 RX ADMIN — ONDANSETRON 4 MG: 2 INJECTION INTRAMUSCULAR; INTRAVENOUS at 19:19

## 2022-01-01 RX ADMIN — MIDAZOLAM HYDROCHLORIDE 0.5 MG: 2 INJECTION, SOLUTION INTRAMUSCULAR; INTRAVENOUS at 10:47

## 2022-01-01 RX ADMIN — SODIUM BICARBONATE 325 MG: 650 TABLET ORAL at 17:59

## 2022-01-01 RX ADMIN — FUROSEMIDE 80 MG: 10 INJECTION, SOLUTION INTRAMUSCULAR; INTRAVENOUS at 06:28

## 2022-01-01 RX ADMIN — MIDAZOLAM HYDROCHLORIDE 2 MG: 1 INJECTION, SOLUTION INTRAMUSCULAR; INTRAVENOUS at 11:39

## 2022-01-01 RX ADMIN — Medication 1000 UNITS: at 06:06

## 2022-01-01 RX ADMIN — MIDAZOLAM HYDROCHLORIDE 2 MG: 1 INJECTION INTRAMUSCULAR; INTRAVENOUS at 11:39

## 2022-01-01 RX ADMIN — MORPHINE SULFATE 2 MG: 4 INJECTION, SOLUTION INTRAMUSCULAR; INTRAVENOUS at 09:10

## 2022-01-01 RX ADMIN — FUROSEMIDE 20 MG: 10 INJECTION, SOLUTION INTRAMUSCULAR; INTRAVENOUS at 18:44

## 2022-01-01 RX ADMIN — ONDANSETRON 4 MG: 4 TABLET, ORALLY DISINTEGRATING ORAL at 18:41

## 2022-01-01 RX ADMIN — FUROSEMIDE 80 MG: 10 INJECTION, SOLUTION INTRAMUSCULAR; INTRAVENOUS at 05:07

## 2022-01-01 RX ADMIN — CARVEDILOL 3.12 MG: 3.12 TABLET, FILM COATED ORAL at 18:01

## 2022-01-01 RX ADMIN — MORPHINE SULFATE 2 MG: 4 INJECTION, SOLUTION INTRAMUSCULAR; INTRAVENOUS at 21:41

## 2022-01-01 RX ADMIN — LORAZEPAM 0.5 MG: 0.5 TABLET ORAL at 12:09

## 2022-01-01 RX ADMIN — Medication 667 MG: at 17:48

## 2022-01-01 RX ADMIN — CARVEDILOL 3.12 MG: 6.25 TABLET, FILM COATED ORAL at 08:26

## 2022-01-01 RX ADMIN — Medication 1000 UNITS: at 05:19

## 2022-01-01 RX ADMIN — Medication 667 MG: at 17:31

## 2022-01-01 RX ADMIN — MORPHINE SULFATE 2 MG: 4 INJECTION, SOLUTION INTRAMUSCULAR; INTRAVENOUS at 18:55

## 2022-01-01 RX ADMIN — FUROSEMIDE 40 MG: 10 INJECTION, SOLUTION INTRAMUSCULAR; INTRAVENOUS at 22:40

## 2022-01-01 RX ADMIN — METOPROLOL TARTRATE 25 MG: 25 TABLET, FILM COATED ORAL at 17:52

## 2022-01-01 RX ADMIN — Medication 667 MG: at 12:01

## 2022-01-01 RX ADMIN — HEPARIN SODIUM 5000 UNITS: 5000 INJECTION, SOLUTION INTRAVENOUS; SUBCUTANEOUS at 22:45

## 2022-01-01 RX ADMIN — APIXABAN 5 MG: 5 TABLET, FILM COATED ORAL at 06:06

## 2022-01-01 RX ADMIN — CEFTRIAXONE SODIUM 2 G: 2 INJECTION, POWDER, FOR SOLUTION INTRAMUSCULAR; INTRAVENOUS at 23:32

## 2022-01-01 RX ADMIN — FUROSEMIDE 80 MG: 10 INJECTION, SOLUTION INTRAMUSCULAR; INTRAVENOUS at 05:10

## 2022-01-01 RX ADMIN — TRAZODONE HYDROCHLORIDE 100 MG: 100 TABLET ORAL at 20:28

## 2022-01-01 RX ADMIN — FENTANYL CITRATE 50 MCG: 50 INJECTION, SOLUTION INTRAMUSCULAR; INTRAVENOUS at 14:21

## 2022-01-01 RX ADMIN — Medication 667 MG: at 18:01

## 2022-01-01 RX ADMIN — TRAZODONE HYDROCHLORIDE 100 MG: 100 TABLET ORAL at 20:30

## 2022-01-01 RX ADMIN — FUROSEMIDE 40 MG: 10 INJECTION, SOLUTION INTRAMUSCULAR; INTRAVENOUS at 11:13

## 2022-01-01 RX ADMIN — Medication 667 MG: at 17:51

## 2022-01-01 RX ADMIN — Medication 1000 UNITS: at 06:08

## 2022-01-01 RX ADMIN — CARVEDILOL 3.12 MG: 6.25 TABLET, FILM COATED ORAL at 20:35

## 2022-01-01 RX ADMIN — SODIUM BICARBONATE 1300 MG: 650 TABLET ORAL at 17:33

## 2022-01-01 RX ADMIN — MORPHINE SULFATE 2 MG: 4 INJECTION, SOLUTION INTRAMUSCULAR; INTRAVENOUS at 00:59

## 2022-01-01 RX ADMIN — TRAZODONE HYDROCHLORIDE 100 MG: 100 TABLET ORAL at 20:39

## 2022-01-01 RX ADMIN — APIXABAN 5 MG: 5 TABLET, FILM COATED ORAL at 18:20

## 2022-01-01 RX ADMIN — Medication 950 MG: at 17:00

## 2022-01-01 RX ADMIN — APIXABAN 5 MG: 5 TABLET, FILM COATED ORAL at 05:25

## 2022-01-01 RX ADMIN — SODIUM BICARBONATE 1300 MG: 650 TABLET ORAL at 17:52

## 2022-01-01 RX ADMIN — TRAZODONE HYDROCHLORIDE 100 MG: 100 TABLET ORAL at 21:15

## 2022-01-01 RX ADMIN — FUROSEMIDE 20 MG: 10 INJECTION, SOLUTION INTRAMUSCULAR; INTRAVENOUS at 11:03

## 2022-01-01 RX ADMIN — CARVEDILOL 3.12 MG: 3.12 TABLET, FILM COATED ORAL at 07:58

## 2022-01-01 RX ADMIN — SODIUM BICARBONATE 1300 MG: 650 TABLET ORAL at 17:47

## 2022-01-01 RX ADMIN — MORPHINE SULFATE 2 MG: 4 INJECTION, SOLUTION INTRAMUSCULAR; INTRAVENOUS at 08:40

## 2022-01-01 RX ADMIN — CARVEDILOL 3.12 MG: 3.12 TABLET, FILM COATED ORAL at 17:50

## 2022-01-01 RX ADMIN — SODIUM BICARBONATE 1300 MG: 650 TABLET ORAL at 17:51

## 2022-01-01 RX ADMIN — MORPHINE SULFATE 2 MG: 4 INJECTION, SOLUTION INTRAMUSCULAR; INTRAVENOUS at 13:39

## 2022-01-01 RX ADMIN — APIXABAN 2.5 MG: 2.5 TABLET, FILM COATED ORAL at 18:15

## 2022-01-01 RX ADMIN — Medication 667 MG: at 07:58

## 2022-01-01 RX ADMIN — MIDAZOLAM HYDROCHLORIDE 0.5 MG: 1 INJECTION INTRAMUSCULAR; INTRAVENOUS at 10:47

## 2022-01-01 RX ADMIN — SODIUM BICARBONATE 1300 MG: 650 TABLET ORAL at 06:16

## 2022-01-01 RX ADMIN — METOPROLOL TARTRATE 25 MG: 25 TABLET, FILM COATED ORAL at 17:25

## 2022-01-01 RX ADMIN — CARVEDILOL 3.12 MG: 3.12 TABLET, FILM COATED ORAL at 17:00

## 2022-01-01 RX ADMIN — LIDOCAINE HYDROCHLORIDE: 10 INJECTION, SOLUTION EPIDURAL; INFILTRATION; INTRACAUDAL; PERINEURAL at 10:48

## 2022-01-01 RX ADMIN — CARVEDILOL 3.12 MG: 3.12 TABLET, FILM COATED ORAL at 07:52

## 2022-01-01 RX ADMIN — APIXABAN 5 MG: 5 TABLET, FILM COATED ORAL at 17:47

## 2022-01-01 RX ADMIN — FERROUS SULFATE TAB 325 MG (65 MG ELEMENTAL FE) 325 MG: 325 (65 FE) TAB at 07:58

## 2022-01-01 RX ADMIN — APIXABAN 2.5 MG: 2.5 TABLET, FILM COATED ORAL at 18:31

## 2022-01-01 RX ADMIN — APIXABAN 2.5 MG: 2.5 TABLET, FILM COATED ORAL at 06:30

## 2022-01-01 RX ADMIN — HEPARIN SODIUM 3200 UNITS: 1000 INJECTION, SOLUTION INTRAVENOUS; SUBCUTANEOUS at 12:17

## 2022-01-01 RX ADMIN — MORPHINE SULFATE 2 MG: 4 INJECTION, SOLUTION INTRAMUSCULAR; INTRAVENOUS at 10:57

## 2022-01-01 RX ADMIN — FERROUS SULFATE TAB 325 MG (65 MG ELEMENTAL FE) 325 MG: 325 (65 FE) TAB at 08:44

## 2022-01-01 RX ADMIN — FUROSEMIDE 40 MG: 10 INJECTION, SOLUTION INTRAMUSCULAR; INTRAVENOUS at 05:34

## 2022-01-01 RX ADMIN — MORPHINE SULFATE 2 MG: 4 INJECTION, SOLUTION INTRAMUSCULAR; INTRAVENOUS at 09:51

## 2022-01-01 RX ADMIN — TRAZODONE HYDROCHLORIDE 100 MG: 100 TABLET ORAL at 20:54

## 2022-01-01 RX ADMIN — SODIUM BICARBONATE 1300 MG: 650 TABLET ORAL at 18:21

## 2022-01-01 RX ADMIN — CARVEDILOL 3.12 MG: 3.12 TABLET, FILM COATED ORAL at 18:20

## 2022-01-01 RX ADMIN — SODIUM CHLORIDE, POTASSIUM CHLORIDE, SODIUM LACTATE AND CALCIUM CHLORIDE 500 ML: 600; 310; 30; 20 INJECTION, SOLUTION INTRAVENOUS at 19:37

## 2022-01-01 RX ADMIN — METOPROLOL TARTRATE 25 MG: 25 TABLET, FILM COATED ORAL at 17:31

## 2022-01-01 RX ADMIN — Medication 1000 UNITS: at 06:30

## 2022-01-01 RX ADMIN — SENNOSIDES AND DOCUSATE SODIUM 2 TABLET: 50; 8.6 TABLET ORAL at 05:46

## 2022-01-01 RX ADMIN — Medication 950 MG: at 05:34

## 2022-01-01 RX ADMIN — APIXABAN 5 MG: 5 TABLET, FILM COATED ORAL at 06:16

## 2022-01-01 RX ADMIN — Medication 667 MG: at 18:31

## 2022-01-01 RX ADMIN — FUROSEMIDE 40 MG: 10 INJECTION, SOLUTION INTRAMUSCULAR; INTRAVENOUS at 17:21

## 2022-01-01 RX ADMIN — FERROUS SULFATE TAB 325 MG (65 MG ELEMENTAL FE) 325 MG: 325 (65 FE) TAB at 10:58

## 2022-01-01 RX ADMIN — FENTANYL CITRATE 50 MCG: 50 INJECTION, SOLUTION INTRAMUSCULAR; INTRAVENOUS at 11:39

## 2022-01-01 RX ADMIN — MORPHINE SULFATE 2 MG: 4 INJECTION, SOLUTION INTRAMUSCULAR; INTRAVENOUS at 10:53

## 2022-01-01 RX ADMIN — Medication 1000 UNITS: at 05:25

## 2022-01-01 RX ADMIN — MORPHINE SULFATE 2 MG: 4 INJECTION, SOLUTION INTRAMUSCULAR; INTRAVENOUS at 03:53

## 2022-01-01 RX ADMIN — Medication 1000 UNITS: at 08:00

## 2022-01-01 RX ADMIN — SODIUM BICARBONATE 650 MG TABLET 325 MG: at 18:31

## 2022-01-01 RX ADMIN — MORPHINE SULFATE 2 MG: 4 INJECTION, SOLUTION INTRAMUSCULAR; INTRAVENOUS at 17:26

## 2022-01-01 RX ADMIN — CARVEDILOL 3.12 MG: 3.12 TABLET, FILM COATED ORAL at 09:12

## 2022-01-01 RX ADMIN — SODIUM BICARBONATE 650 MG TABLET 325 MG: at 06:29

## 2022-01-01 RX ADMIN — CARVEDILOL 3.12 MG: 6.25 TABLET, FILM COATED ORAL at 18:15

## 2022-01-01 RX ADMIN — FUROSEMIDE 40 MG: 10 INJECTION, SOLUTION INTRAMUSCULAR; INTRAVENOUS at 06:17

## 2022-01-01 RX ADMIN — TRAZODONE HYDROCHLORIDE 100 MG: 100 TABLET ORAL at 21:01

## 2022-01-01 RX ADMIN — Medication 1000 UNITS: at 06:13

## 2022-01-01 RX ADMIN — SODIUM CHLORIDE, POTASSIUM CHLORIDE, SODIUM LACTATE AND CALCIUM CHLORIDE: 600; 310; 30; 20 INJECTION, SOLUTION INTRAVENOUS at 19:37

## 2022-01-01 RX ADMIN — FENTANYL CITRATE 50 MCG: 50 INJECTION, SOLUTION INTRAMUSCULAR; INTRAVENOUS at 14:17

## 2022-01-01 RX ADMIN — MORPHINE SULFATE 7.5 MG: 15 TABLET ORAL at 20:00

## 2022-01-01 RX ADMIN — CARVEDILOL 3.12 MG: 3.12 TABLET, FILM COATED ORAL at 17:47

## 2022-01-01 RX ADMIN — SODIUM BICARBONATE 650 MG TABLET 325 MG: at 18:15

## 2022-01-01 RX ADMIN — TRAZODONE HYDROCHLORIDE 100 MG: 50 TABLET ORAL at 00:48

## 2022-01-01 RX ADMIN — MORPHINE SULFATE 2 MG: 4 INJECTION, SOLUTION INTRAMUSCULAR; INTRAVENOUS at 19:20

## 2022-01-01 RX ADMIN — SODIUM BICARBONATE 650 MG TABLET 325 MG: at 05:44

## 2022-01-01 RX ADMIN — TRAZODONE HYDROCHLORIDE 100 MG: 50 TABLET ORAL at 20:35

## 2022-01-01 RX ADMIN — MORPHINE SULFATE 2 MG: 4 INJECTION, SOLUTION INTRAMUSCULAR; INTRAVENOUS at 22:26

## 2022-01-01 RX ADMIN — APIXABAN 5 MG: 5 TABLET, FILM COATED ORAL at 05:19

## 2022-01-01 RX ADMIN — APIXABAN 5 MG: 5 TABLET, FILM COATED ORAL at 17:25

## 2022-01-01 RX ADMIN — FUROSEMIDE 80 MG: 10 INJECTION, SOLUTION INTRAMUSCULAR; INTRAVENOUS at 06:06

## 2022-01-01 RX ADMIN — FERROUS SULFATE TAB 325 MG (65 MG ELEMENTAL FE) 325 MG: 325 (65 FE) TAB at 07:50

## 2022-01-01 RX ADMIN — FUROSEMIDE 40 MG: 10 INJECTION, SOLUTION INTRAMUSCULAR; INTRAVENOUS at 05:17

## 2022-01-01 RX ADMIN — MORPHINE SULFATE 2 MG: 4 INJECTION, SOLUTION INTRAMUSCULAR; INTRAVENOUS at 07:35

## 2022-01-01 RX ADMIN — HEPARIN SODIUM 5000 UNITS: 5000 INJECTION, SOLUTION INTRAVENOUS; SUBCUTANEOUS at 05:46

## 2022-01-01 RX ADMIN — MORPHINE SULFATE 2 MG: 4 INJECTION, SOLUTION INTRAMUSCULAR; INTRAVENOUS at 16:02

## 2022-01-01 RX ADMIN — APIXABAN 2.5 MG: 2.5 TABLET, FILM COATED ORAL at 05:44

## 2022-01-01 RX ADMIN — Medication 1000 UNITS: at 05:34

## 2022-01-01 RX ADMIN — HEPARIN SODIUM 3200 UNITS: 1000 INJECTION, SOLUTION INTRAVENOUS; SUBCUTANEOUS at 10:35

## 2022-01-01 RX ADMIN — MORPHINE SULFATE 2 MG: 4 INJECTION, SOLUTION INTRAMUSCULAR; INTRAVENOUS at 05:29

## 2022-01-01 RX ADMIN — SODIUM BICARBONATE 1300 MG: 650 TABLET ORAL at 06:06

## 2022-01-01 RX ADMIN — SODIUM CHLORIDE 2 G: 1 TABLET ORAL at 20:46

## 2022-01-01 RX ADMIN — METOPROLOL TARTRATE 25 MG: 25 TABLET, FILM COATED ORAL at 17:00

## 2022-01-01 RX ADMIN — CARVEDILOL 3.12 MG: 6.25 TABLET, FILM COATED ORAL at 08:54

## 2022-01-01 RX ADMIN — Medication 667 MG: at 11:58

## 2022-01-01 RX ADMIN — FENTANYL CITRATE 25 MCG: 50 INJECTION, SOLUTION INTRAMUSCULAR; INTRAVENOUS at 11:19

## 2022-01-01 RX ADMIN — FUROSEMIDE 40 MG: 10 INJECTION, SOLUTION INTRAMUSCULAR; INTRAVENOUS at 06:16

## 2022-01-01 RX ADMIN — FUROSEMIDE 40 MG: 10 INJECTION, SOLUTION INTRAMUSCULAR; INTRAVENOUS at 14:42

## 2022-01-01 RX ADMIN — TRAZODONE HYDROCHLORIDE 100 MG: 100 TABLET ORAL at 20:19

## 2022-01-01 RX ADMIN — FUROSEMIDE 80 MG: 10 INJECTION, SOLUTION INTRAMUSCULAR; INTRAVENOUS at 05:19

## 2022-01-01 RX ADMIN — TRAZODONE HYDROCHLORIDE 100 MG: 100 TABLET ORAL at 20:31

## 2022-01-01 RX ADMIN — HEPARIN SODIUM 3200 UNITS: 1000 INJECTION, SOLUTION INTRAVENOUS; SUBCUTANEOUS at 13:46

## 2022-01-01 RX ADMIN — CARVEDILOL 3.12 MG: 3.12 TABLET, FILM COATED ORAL at 08:00

## 2022-01-01 RX ADMIN — CARVEDILOL 3.12 MG: 3.12 TABLET, FILM COATED ORAL at 08:44

## 2022-01-01 RX ADMIN — FUROSEMIDE 80 MG: 40 TABLET ORAL at 13:14

## 2022-01-01 RX ADMIN — FENTANYL CITRATE 50 MCG: 50 INJECTION, SOLUTION INTRAMUSCULAR; INTRAVENOUS at 10:47

## 2022-01-01 RX ADMIN — MORPHINE SULFATE 2 MG: 4 INJECTION, SOLUTION INTRAMUSCULAR; INTRAVENOUS at 04:14

## 2022-01-01 RX ADMIN — MORPHINE SULFATE 2 MG: 4 INJECTION, SOLUTION INTRAMUSCULAR; INTRAVENOUS at 22:15

## 2022-01-01 RX ADMIN — Medication 667 MG: at 12:18

## 2022-01-01 RX ADMIN — Medication 1000 UNITS: at 05:09

## 2022-01-01 RX ADMIN — MIDAZOLAM HYDROCHLORIDE 2 MG: 1 INJECTION INTRAMUSCULAR; INTRAVENOUS at 14:17

## 2022-01-01 RX ADMIN — ONDANSETRON 4 MG: 2 INJECTION INTRAMUSCULAR; INTRAVENOUS at 17:03

## 2022-01-01 RX ADMIN — MORPHINE SULFATE 2 MG: 4 INJECTION, SOLUTION INTRAMUSCULAR; INTRAVENOUS at 14:01

## 2022-01-01 RX ADMIN — CARVEDILOL 3.12 MG: 3.12 TABLET, FILM COATED ORAL at 08:27

## 2022-01-01 RX ADMIN — Medication 667 MG: at 08:27

## 2022-01-01 RX ADMIN — ACETAMINOPHEN 650 MG: 325 TABLET, FILM COATED ORAL at 00:15

## 2022-01-01 RX ADMIN — FUROSEMIDE 80 MG: 10 INJECTION, SOLUTION INTRAMUSCULAR; INTRAVENOUS at 06:07

## 2022-01-01 SDOH — ECONOMIC STABILITY: HOUSING INSECURITY: HOME SAFETY: STAIRS IN HOUSE. PATIENT DOES NOT USE

## 2022-01-01 SDOH — ECONOMIC STABILITY: TRANSPORTATION INSECURITY
IN THE PAST 12 MONTHS, HAS THE LACK OF TRANSPORTATION KEPT YOU FROM MEDICAL APPOINTMENTS OR FROM GETTING MEDICATIONS?: NO

## 2022-01-01 SDOH — ECONOMIC STABILITY: HOUSING INSECURITY: EVIDENCE OF SMOKING MATERIAL: 0

## 2022-01-01 SDOH — ECONOMIC STABILITY: FOOD INSECURITY: WITHIN THE PAST 12 MONTHS, THE FOOD YOU BOUGHT JUST DIDN'T LAST AND YOU DIDN'T HAVE MONEY TO GET MORE.: NEVER TRUE

## 2022-01-01 SDOH — ECONOMIC STABILITY: TRANSPORTATION INSECURITY
IN THE PAST 12 MONTHS, HAS LACK OF TRANSPORTATION KEPT YOU FROM MEETINGS, WORK, OR FROM GETTING THINGS NEEDED FOR DAILY LIVING?: NO

## 2022-01-01 SDOH — ECONOMIC STABILITY: FOOD INSECURITY: WITHIN THE PAST 12 MONTHS, YOU WORRIED THAT YOUR FOOD WOULD RUN OUT BEFORE YOU GOT MONEY TO BUY MORE.: NEVER TRUE

## 2022-01-01 SDOH — ECONOMIC STABILITY: GENERAL

## 2022-01-01 SDOH — ECONOMIC STABILITY: HOUSING INSECURITY: HOME SAFETY: EDUCATED PT/FAMILY ON KEEPING CLEAR PATHWAYS;PT'S BED HAS BEEN MOVED TO 1ST FLOOR

## 2022-01-01 SDOH — ECONOMIC STABILITY: INCOME INSECURITY: HOW HARD IS IT FOR YOU TO PAY FOR THE VERY BASICS LIKE FOOD, HOUSING, MEDICAL CARE, AND HEATING?: NOT HARD AT ALL

## 2022-01-01 ASSESSMENT — ENCOUNTER SYMPTOMS
DENIES PAIN: 1
FOCAL WEAKNESS: 0
EYE PAIN: 0
LOWEST PAIN SEVERITY IN PAST 24 HOURS: 2/10
PALPITATIONS: 0
MYALGIAS: 0
HEADACHES: 0
PAIN: 1
SHORTNESS OF BREATH: 0
FEVER: 0
SENSORY CHANGE: 0
FEVER: 0
EYE PAIN: 0
SPUTUM PRODUCTION: 0
SORE THROAT: 0
SHORTNESS OF BREATH: 0
LAST BOWEL MOVEMENT: 66423
MYALGIAS: 0
COUGH: 0
NERVOUS/ANXIOUS: 0
ABDOMINAL PAIN: 0
BACK PAIN: 0
PALPITATIONS: 0
ABDOMINAL PAIN: 0
LOWEST PAIN SEVERITY IN PAST 24 HOURS: 0/10
BLOOD IN STOOL: 0
CLAUDICATION: 0
WEAKNESS: 1
RESPIRATORY NEGATIVE: 1
LOSS OF CONSCIOUSNESS: 0
PAIN: 1
FEVER: 0
ABDOMINAL PAIN: 0
DENIES PAIN: 1
STOOL FREQUENCY: DAILY
MUSCULOSKELETAL NEGATIVE: 1
PAIN LOCATION: GENERALIZED
PAIN LOCATION: GENERALIZED
FEVER: 0
WEIGHT LOSS: 0
SHORTNESS OF BREATH: 0
GASTROINTESTINAL NEGATIVE: 1
DENIES PAIN: 1
SHORTNESS OF BREATH: 0
DYSPNEA ON EXERTION: 1
CARDIOVASCULAR NEGATIVE: 1
CHILLS: 0
FEVER: 0
CARDIOVASCULAR NEGATIVE: 1
SHORTNESS OF BREATH: 0
PAIN: PATIENT DENIES ANY PAIN AT THIS TIME.
DEPRESSION: 0
ABDOMINAL PAIN: 0
CHANGE IN LEVEL OF CONSCIOUSNESS: 1
PERSON REPORTING PAIN: PATIENT
BACK PAIN: 0
ORTHOPNEA: 1
HYPOTENSION: 1
DYSPNEA ON EXERTION: 1
COUGH: 0
STOOL FREQUENCY: LESS THAN DAILY
SPEECH CHANGE: 0
DYSPNEA ON EXERTION: 1
GASTROINTESTINAL NEGATIVE: 1
DYSPNEA ACTIVITY LEVEL: AFTER AMBULATING MORE THAN 20 FT
PAIN LOCATION - PAIN SEVERITY: 2/10
VOMITING: 0
STRIDOR: 0
ABDOMINAL PAIN: 0
ORTHOPNEA: 0
NAUSEA: 1
GASTROINTESTINAL NEGATIVE: 1
HEADACHES: 0
SHORTNESS OF BREATH: 1
PSYCHIATRIC NEGATIVE: 1
PALPITATIONS: 0
HIGHEST PAIN SEVERITY IN PAST 24 HOURS: 6/10
EYES NEGATIVE: 1
DYSPNEA ON EXERTION: 1
SHORTNESS OF BREATH: 0
FLANK PAIN: 0
HIGHEST PAIN SEVERITY IN PAST 24 HOURS: 0/10
HEADACHES: 0
FOCAL WEAKNESS: 0
COUGH: 0
DENIES PAIN: 1
SHORTNESS OF BREATH: 1
SHORTNESS OF BREATH: 1
SHORTNESS OF BREATH: 0
DEPRESSION: 0
SHORTNESS OF BREATH: 1
FEVER: 0
CHILLS: 0
EYES NEGATIVE: 1
SLEEP QUALITY: FAIR
SORE THROAT: 0
HEARTBURN: 0
PERSON REPORTING PAIN: PATIENT
MUSCULOSKELETAL NEGATIVE: 1
SORE THROAT: 0
HIGHEST PAIN SEVERITY IN PAST 24 HOURS: 0/10
CHILLS: 0
HEMOPTYSIS: 0
COUGH: 0
EYES NEGATIVE: 1
SORE THROAT: 0
SLEEP QUALITY: ADEQUATE
NEUROLOGICAL NEGATIVE: 1
MUSCLE WEAKNESS: 1
ABDOMINAL PAIN: 0
VOMITING: 0
PAIN LOCATION: GENERALIZED
VOMITING: DENIES
NAUSEA: DENIES
HIGHEST PAIN SEVERITY IN PAST 24 HOURS: 2/10
PAIN LOCATION - PAIN SEVERITY: 3/10
BLURRED VISION: 0
PAIN: 1
APPETITE LEVEL: FAIR
SORE THROAT: 0
PSYCHIATRIC NEGATIVE: 1
EYE PAIN: 0
RESPIRATORY NEGATIVE: 1
SHORTNESS OF BREATH: 1
SUBJECTIVE PAIN PROGRESSION: WAXING AND WANING
BACK PAIN: 0
DYSPNEA ACTIVITY LEVEL: AFTER AMBULATING MORE THAN 20 FT
COUGH: 0
NAUSEA: 0
MUSCLE WEAKNESS: 1
CONSTITUTIONAL NEGATIVE: 1
BLURRED VISION: 0
PND: 0
FATIGUES EASILY: 1
ORTHOPNEA: 1
PERSON REPORTING PAIN: PATIENT
HEARTBURN: 0
FATIGUES EASILY: 1
PND: 1
CARDIOVASCULAR NEGATIVE: 1
FATIGUES EASILY: 1
FEVER: 0
DYSPNEA ACTIVITY LEVEL: AFTER AMBULATING 10 - 20 FT
CHILLS: 0
LAST BOWEL MOVEMENT: 66430
SHORTNESS OF BREATH: 1
HEARTBURN: 0
HIGHEST PAIN SEVERITY IN PAST 24 HOURS: 0/10
PERSON REPORTING PAIN: PATIENT
PALPITATIONS: 0
BLURRED VISION: 0
STOOL FREQUENCY: DAILY
WHEEZING: 0
VOMITING: 0
SLEEP QUALITY: FAIR
SUBJECTIVE PAIN PROGRESSION: UNCHANGED
VOMITING: DENIES
PALPITATIONS: 0
FATIGUES EASILY: 1
NEUROLOGICAL NEGATIVE: 1
MEMORY LOSS: 0
SEIZURES: 0
TINGLING: 0
PAIN: 1
BACK PAIN: 0
PAIN LOCATION - PAIN SEVERITY: 0/10
VOMITING: DENIES
DIAPHORESIS: 0
DOUBLE VISION: 0
CHILLS: 0
VOMITING: 0
DEPRESSION: 0
HEADACHES: 0
STOOL FREQUENCY: LESS THAN DAILY
FEVER: 0
LAST BOWEL MOVEMENT: 66443
COUGH: 0
NEUROLOGICAL NEGATIVE: 1
NECK PAIN: 0
CHILLS: 0
VOMITING: 0
SHORTNESS OF BREATH: 0
PALPITATIONS: 0
PAIN SEVERITY GOAL: 0/10
SHORTNESS OF BREATH: 1
MUSCLE WEAKNESS: 1
CONSTIPATION: 0
LAST BOWEL MOVEMENT: 66458
FATIGUES EASILY: 1
DOUBLE VISION: 0
GASTROINTESTINAL NEGATIVE: 1
DIZZINESS: 0
LOSS OF CONSCIOUSNESS: 0
SHORTNESS OF BREATH: 0
WEAKNESS: 1
DIZZINESS: 1
DIZZINESS: 0
SUBJECTIVE PAIN PROGRESSION: UNCHANGED
MUSCLE WEAKNESS: 1
FEVER: 0
PALPITATIONS: 0
DENIES PAIN: 1
PAIN SEVERITY GOAL: 0/10
SHORTNESS OF BREATH: 1
WEAKNESS: 1
SUBJECTIVE PAIN PROGRESSION: UNCHANGED
CONSTIPATION: 1
FEVER: 0
FEVER: 0
SORE THROAT: 0
FEVER: 0
PAIN SEVERITY GOAL: 0/10
DENIES PAIN: 1
FATIGUES EASILY: 1
COUGH: 0
DYSPNEA ON EXERTION: 1
LAST BOWEL MOVEMENT: 66451
LOWEST PAIN SEVERITY IN PAST 24 HOURS: 0/10
NAUSEA: DENIES
SHORTNESS OF BREATH: 1
CHILLS: 0
LOWER EXTREMITY EDEMA: 1
GASTROINTESTINAL NEGATIVE: 1
NAUSEA: 0
PALPITATIONS: 0
SHORTNESS OF BREATH: 0
LOWEST PAIN SEVERITY IN PAST 24 HOURS: 0/10
EYE PAIN: 0
NERVOUS/ANXIOUS: 0
PAIN SEVERITY GOAL: 2/10
EYE PAIN: 0
NAUSEA: 0
ABDOMINAL PAIN: 0
DEPRESSION: 0
BACK PAIN: 0
CHILLS: 0
HEMOPTYSIS: 0
FATIGUES EASILY: 1
LOWER EXTREMITY EDEMA: 1
DIZZINESS: 0
SINUS PAIN: 0
CHILLS: 0
NAUSEA: DENIES
EYES NEGATIVE: 1
BLURRED VISION: 0
WEAKNESS: 1
HEARTBURN: 0
BOWEL PATTERN NORMAL: 1
BOWEL PATTERN NORMAL: 1
RESPIRATORY NEGATIVE: 1
ABDOMINAL PAIN: 0
HEADACHES: 0
BLURRED VISION: 0
CHILLS: 0
CHILLS: 0
SHORTNESS OF BREATH: 0
DIZZINESS: 0
NAUSEA: 0
SLEEP QUALITY: FAIR
WEAKNESS: 1
DENIES PAIN: 1
BLURRED VISION: 0
SPUTUM PRODUCTION: 0
BLURRED VISION: 0
FEVER: 0
HEADACHES: 0
CHANGE IN APPETITE: DECREASED
ORTHOPNEA: 0
LAST BOWEL MOVEMENT: 66428
COUGH: 0
HIGHEST PAIN SEVERITY IN PAST 24 HOURS: 0/10
PAIN SEVERITY GOAL: 0/10
LOWEST PAIN SEVERITY IN PAST 24 HOURS: 0/10
FATIGUES EASILY: 1
DIARRHEA: 0
LOWEST PAIN SEVERITY IN PAST 24 HOURS: 0/10
FOCAL WEAKNESS: 0
MYALGIAS: 0
STOOL FREQUENCY: DAILY
SUBJECTIVE PAIN PROGRESSION: UNCHANGED
HEARTBURN: 0
ABDOMINAL PAIN: 0
SHORTNESS OF BREATH: 1
CHILLS: 0
SHORTNESS OF BREATH: 0
FOCAL WEAKNESS: 0
SLEEP QUALITY: FAIR
PAIN: 1
VOMITING: 0
SORE THROAT: 0
HEADACHES: 0
DIZZINESS: 0
PSYCHIATRIC NEGATIVE: 1
STOOL FREQUENCY: LESS THAN DAILY
FATIGUES EASILY: 1
DIZZINESS: 0
MUSCLE WEAKNESS: 1
HEADACHES: 0
FEVER: 0
PAIN: 1
DYSPNEA ACTIVITY LEVEL: AFTER AMBULATING MORE THAN 20 FT
DYSPNEA ACTIVITY LEVEL: AFTER AMBULATING 10 - 20 FT
VOMITING: 0
NEUROLOGICAL NEGATIVE: 1
CARDIOVASCULAR NEGATIVE: 1
WHEEZING: 0
RESPIRATORY NEGATIVE: 1
DENIES PAIN: 1
BRUISES/BLEEDS EASILY: 1
FEVER: 0
BOWEL INCONTINENCE: 1
COUGH: 0
SHORTNESS OF BREATH: 1
SEIZURES: 0
BOWEL PATTERN NORMAL: 1
PERSON REPORTING PAIN: DIRECT OBSERVATION
BACK PAIN: 0
WHEEZING: 1
HEMOPTYSIS: 0
COUGH: 0
DYSPNEA ON EXERTION: 1
BACK PAIN: 0
PSYCHIATRIC NEGATIVE: 1
BRUISES/BLEEDS EASILY: 0
HIGHEST PAIN SEVERITY IN PAST 24 HOURS: 0/10
DYSPNEA ACTIVITY LEVEL: AFTER AMBULATING MORE THAN 20 FT
VOMITING: 0
FEVER: 0
DENIES PAIN: 1
CARDIOVASCULAR NEGATIVE: 1
CONSTIPATION: 0
EYE PAIN: 0
SHORTNESS OF BREATH: 0
STOOL FREQUENCY: DAILY
COUGH: 0
DEPRESSION: 0
PALPITATIONS: 0
ABDOMINAL PAIN: 0
CHILLS: 0
LOSS OF CONSCIOUSNESS: 0
PERSON REPORTING PAIN: PATIENT
DIZZINESS: 0
DIARRHEA: 0
PERSON REPORTING PAIN: PATIENT
DENIES PAIN: 1
EYE DISCHARGE: 0
SHORTNESS OF BREATH: 0
DIZZINESS: 0
COUGH: 0
WEAKNESS: 1
RESPIRATORY NEGATIVE: 1
SHORTNESS OF BREATH: 1
BRUISES/BLEEDS EASILY: 0
HEADACHES: 0
EYES NEGATIVE: 1
BOWEL PATTERN NORMAL: 1
COUGH: 0
LOWER EXTREMITY EDEMA: 1
PALPITATIONS: 0
STOOL FREQUENCY: DAILY
DEPRESSION: 0
SHORTNESS OF BREATH: 0
ABDOMINAL PAIN: 0
COUGH: 0
ABDOMINAL PAIN: 0
HEARTBURN: 0
WEAKNESS: 1
MUSCULOSKELETAL NEGATIVE: 1
SHORTNESS OF BREATH: 1
BOWEL INCONTINENCE: 1
BACK PAIN: 0
SHORTNESS OF BREATH: 1
CHANGE IN LEVEL OF CONSCIOUSNESS: 1
BOWEL PATTERN NORMAL: 1
CHILLS: 0
DIZZINESS: 0
BOWEL PATTERN NORMAL: 1
BOWEL PATTERN NORMAL: 1
DIZZINESS: 0
COUGH: 0
WEAKNESS: 1
LAST BOWEL MOVEMENT: 66435
DENIES PAIN: 1
CHILLS: 0
EYE PAIN: 0
ABDOMINAL PAIN: 0
CHILLS: 0
NAUSEA: 0
CHILLS: 0
DIARRHEA: 1
STRIDOR: 0
HEARTBURN: 0
SORE THROAT: 0
HEADACHES: 0
PAIN LOCATION: GENERALIZED
ORTHOPNEA: 0
PALPITATIONS: 0
WHEEZING: 0
MYALGIAS: 0
EYE REDNESS: 0
WEAKNESS: 1
FEVER: 0
MUSCLE WEAKNESS: 1
FEVER: 0
SORE THROAT: 0
HEARTBURN: 0
WEAKNESS: 1
MUSCULOSKELETAL NEGATIVE: 1
BACK PAIN: 0
CONSTIPATION: 1
SLEEP QUALITY: FAIR
FEVER: 0
PAIN SEVERITY GOAL: 0/10
LOWEST PAIN SEVERITY IN PAST 24 HOURS: 0/10
BLURRED VISION: 0
BLURRED VISION: 0
MUSCLE WEAKNESS: 1
WEAKNESS: 1
NAUSEA: 0
HEARTBURN: 0
NAUSEA: 0
BLOOD IN STOOL: 0
CHILLS: 0
BRUISES/BLEEDS EASILY: 0
MUSCULOSKELETAL NEGATIVE: 1
DEPRESSION: 0
FLANK PAIN: 0
COUGH: 0

## 2022-01-01 ASSESSMENT — COGNITIVE AND FUNCTIONAL STATUS - GENERAL
CLIMB 3 TO 5 STEPS WITH RAILING: A LITTLE
DAILY ACTIVITIY SCORE: 20
DAILY ACTIVITIY SCORE: 20
MOBILITY SCORE: 21
SUGGESTED CMS G CODE MODIFIER MOBILITY: CK
MOBILITY SCORE: 23
SUGGESTED CMS G CODE MODIFIER MOBILITY: CJ
SUGGESTED CMS G CODE MODIFIER MOBILITY: CJ
MOBILITY SCORE: 19
WALKING IN HOSPITAL ROOM: A LITTLE
MOVING FROM LYING ON BACK TO SITTING ON SIDE OF FLAT BED: A LITTLE
WALKING IN HOSPITAL ROOM: A LITTLE
SUGGESTED CMS G CODE MODIFIER DAILY ACTIVITY: CJ
STANDING UP FROM CHAIR USING ARMS: A LITTLE
DRESSING REGULAR LOWER BODY CLOTHING: A LITTLE
DRESSING REGULAR UPPER BODY CLOTHING: A LITTLE
TOILETING: A LITTLE
TOILETING: A LITTLE
MOBILITY SCORE: 21
SUGGESTED CMS G CODE MODIFIER DAILY ACTIVITY: CJ
SUGGESTED CMS G CODE MODIFIER MOBILITY: CI
STANDING UP FROM CHAIR USING ARMS: A LITTLE
HELP NEEDED FOR BATHING: A LITTLE
HELP NEEDED FOR BATHING: A LITTLE
SUGGESTED CMS G CODE MODIFIER DAILY ACTIVITY: CH
SUGGESTED CMS G CODE MODIFIER DAILY ACTIVITY: CH
DAILY ACTIVITIY SCORE: 24
MOVING TO AND FROM BED TO CHAIR: A LITTLE
WALKING IN HOSPITAL ROOM: A LITTLE
DRESSING REGULAR UPPER BODY CLOTHING: A LITTLE
DAILY ACTIVITIY SCORE: 24
SUGGESTED CMS G CODE MODIFIER MOBILITY: CH
CLIMB 3 TO 5 STEPS WITH RAILING: A LITTLE
DRESSING REGULAR LOWER BODY CLOTHING: A LITTLE
CLIMB 3 TO 5 STEPS WITH RAILING: A LITTLE
CLIMB 3 TO 5 STEPS WITH RAILING: A LITTLE
SUGGESTED CMS G CODE MODIFIER DAILY ACTIVITY: CJ
MOVING FROM LYING ON BACK TO SITTING ON SIDE OF FLAT BED: A LITTLE
TOILETING: A LITTLE
MOBILITY SCORE: 24
DAILY ACTIVITIY SCORE: 22
DRESSING REGULAR LOWER BODY CLOTHING: A LITTLE

## 2022-01-01 ASSESSMENT — PAIN SCALES - PAIN ASSESSMENT IN ADVANCED DEMENTIA (PAINAD)
BODYLANGUAGE: 1 - TENSE. DISTRESSED PACING. FIDGETING.
CONSOLABILITY: 0 - NO NEED TO CONSOLE.
TOTALSCORE: 0
FACIALEXPRESSION: 0 - SMILING OR INEXPRESSIVE.
CONSOLABILITY: 0 - NO NEED TO CONSOLE.
FACIALEXPRESSION: 0 - SMILING OR INEXPRESSIVE.
BODYLANGUAGE: 0 - RELAXED.
NEGVOCALIZATION: 0 - NONE.
NEGVOCALIZATION: 0 - NONE.
TOTALSCORE: 2

## 2022-01-01 ASSESSMENT — PATIENT HEALTH QUESTIONNAIRE - PHQ9
1. LITTLE INTEREST OR PLEASURE IN DOING THINGS: NOT AT ALL
1. LITTLE INTEREST OR PLEASURE IN DOING THINGS: 00
SUM OF ALL RESPONSES TO PHQ9 QUESTIONS 1 AND 2: 0
SUM OF ALL RESPONSES TO PHQ9 QUESTIONS 1 AND 2: 0
2. FEELING DOWN, DEPRESSED, IRRITABLE, OR HOPELESS: 00
SUM OF ALL RESPONSES TO PHQ9 QUESTIONS 1 AND 2: 0
1. LITTLE INTEREST OR PLEASURE IN DOING THINGS: NOT AT ALL
SUM OF ALL RESPONSES TO PHQ9 QUESTIONS 1 AND 2: 0
2. FEELING DOWN, DEPRESSED, IRRITABLE, OR HOPELESS: NOT AT ALL
CLINICAL INTERPRETATION OF PHQ2 SCORE: 0
CLINICAL INTERPRETATION OF PHQ2 SCORE: 0
1. LITTLE INTEREST OR PLEASURE IN DOING THINGS: NOT AT ALL
1. LITTLE INTEREST OR PLEASURE IN DOING THINGS: NOT AT ALL
2. FEELING DOWN, DEPRESSED, IRRITABLE, OR HOPELESS: NOT AT ALL
CLINICAL INTERPRETATION OF PHQ2 SCORE: 0

## 2022-01-01 ASSESSMENT — LIFESTYLE VARIABLES
HOW MANY TIMES IN THE PAST YEAR HAVE YOU HAD 5 OR MORE DRINKS IN A DAY: 0
TOTAL SCORE: 0
EVER FELT BAD OR GUILTY ABOUT YOUR DRINKING: NO
EVER FELT BAD OR GUILTY ABOUT YOUR DRINKING: NO
HOW MANY TIMES IN THE PAST YEAR HAVE YOU HAD 5 OR MORE DRINKS IN A DAY: 1
TOTAL SCORE: 0
EVER HAD A DRINK FIRST THING IN THE MORNING TO STEADY YOUR NERVES TO GET RID OF A HANGOVER: NO
TOTAL SCORE: 0
EVER HAD A DRINK FIRST THING IN THE MORNING TO STEADY YOUR NERVES TO GET RID OF A HANGOVER: NO
TOTAL SCORE: 0
TOTAL SCORE: 0
AVERAGE NUMBER OF DAYS PER WEEK YOU HAVE A DRINK CONTAINING ALCOHOL: 0
TOTAL SCORE: 0
HAVE PEOPLE ANNOYED YOU BY CRITICIZING YOUR DRINKING: NO
CONSUMPTION TOTAL: INCOMPLETE
HAVE YOU EVER FELT YOU SHOULD CUT DOWN ON YOUR DRINKING: NO
TOTAL SCORE: 0
CONSUMPTION TOTAL: NEGATIVE
ON A TYPICAL DAY WHEN YOU DRINK ALCOHOL HOW MANY DRINKS DO YOU HAVE: 1
EVER FELT BAD OR GUILTY ABOUT YOUR DRINKING: NO
HAVE PEOPLE ANNOYED YOU BY CRITICIZING YOUR DRINKING: NO
HAVE YOU EVER FELT YOU SHOULD CUT DOWN ON YOUR DRINKING: NO
CONSUMPTION TOTAL: NEGATIVE
HAVE YOU EVER FELT YOU SHOULD CUT DOWN ON YOUR DRINKING: NO
TOTAL SCORE: 0
TOTAL SCORE: 0
HAVE PEOPLE ANNOYED YOU BY CRITICIZING YOUR DRINKING: NO
ALCOHOL_USE: NO
TOTAL SCORE: 0
CONSUMPTION TOTAL: INCOMPLETE
EVER FELT BAD OR GUILTY ABOUT YOUR DRINKING: NO
EVER HAD A DRINK FIRST THING IN THE MORNING TO STEADY YOUR NERVES TO GET RID OF A HANGOVER: NO
TOTAL SCORE: 0
HAVE YOU EVER FELT YOU SHOULD CUT DOWN ON YOUR DRINKING: NO
DO YOU DRINK ALCOHOL: YES
ALCOHOL_USE: NO
TOTAL SCORE: 0
HAVE PEOPLE ANNOYED YOU BY CRITICIZING YOUR DRINKING: NO
HAVE PEOPLE ANNOYED YOU BY CRITICIZING YOUR DRINKING: NO
DOES PATIENT WANT TO STOP DRINKING: NO
EVER HAD A DRINK FIRST THING IN THE MORNING TO STEADY YOUR NERVES TO GET RID OF A HANGOVER: NO
AVERAGE NUMBER OF DAYS PER WEEK YOU HAVE A DRINK CONTAINING ALCOHOL: 1
ALCOHOL_USE: NO
TOTAL SCORE: 0
HOW MANY TIMES IN THE PAST YEAR HAVE YOU HAD 5 OR MORE DRINKS IN A DAY: 0
HAVE YOU EVER FELT YOU SHOULD CUT DOWN ON YOUR DRINKING: NO
EVER HAD A DRINK FIRST THING IN THE MORNING TO STEADY YOUR NERVES TO GET RID OF A HANGOVER: NO
TOTAL SCORE: 0
EVER FELT BAD OR GUILTY ABOUT YOUR DRINKING: NO
ON A TYPICAL DAY WHEN YOU DRINK ALCOHOL HOW MANY DRINKS DO YOU HAVE: 0
ALCOHOL_USE: YES
HAVE PEOPLE ANNOYED YOU BY CRITICIZING YOUR DRINKING: NO
TOTAL SCORE: 0
DO YOU DRINK ALCOHOL: NO
EVER HAD A DRINK FIRST THING IN THE MORNING TO STEADY YOUR NERVES TO GET RID OF A HANGOVER: NO
AVERAGE NUMBER OF DAYS PER WEEK YOU HAVE A DRINK CONTAINING ALCOHOL: 0
HAVE PEOPLE ANNOYED YOU BY CRITICIZING YOUR DRINKING: NO
ALCOHOL_USE: NO
TOTAL SCORE: 0
HAVE YOU EVER FELT YOU SHOULD CUT DOWN ON YOUR DRINKING: NO
TOTAL SCORE: 0
CONSUMPTION TOTAL: INCOMPLETE
CONSUMPTION TOTAL: POSITIVE
TOTAL SCORE: 0
HAVE YOU EVER FELT YOU SHOULD CUT DOWN ON YOUR DRINKING: NO
EVER FELT BAD OR GUILTY ABOUT YOUR DRINKING: NO
TOTAL SCORE: 0
TOTAL SCORE: 0
ON A TYPICAL DAY WHEN YOU DRINK ALCOHOL HOW MANY DRINKS DO YOU HAVE: 2
EVER FELT BAD OR GUILTY ABOUT YOUR DRINKING: NO
EVER HAD A DRINK FIRST THING IN THE MORNING TO STEADY YOUR NERVES TO GET RID OF A HANGOVER: NO
TOTAL SCORE: 0
CONSUMPTION TOTAL: INCOMPLETE

## 2022-01-01 ASSESSMENT — ACTIVITIES OF DAILY LIVING (ADL)
PHYSICAL_TRANSFER_REQUIRES_ASSISTANCE: 1
EATING_REQUIRES_ASSISTANCE: 1
AMBULATION_REQUIRES_ASSISTANCE: 1
MONEY MANAGEMENT (EXPENSES/BILLS): INDEPENDENT
HOME_HEALTH_OASIS: 01
PHYSICAL_TRANSFER_REQUIRES_ASSISTANCE: 1
BATHING_REQUIRES_ASSISTANCE: 1
AMBULATION ASSISTANCE: STAND BY ASSIST
MONEY MANAGEMENT (EXPENSES/BILLS): INDEPENDENT
CURRENT_FUNCTION: STAND BY ASSIST
OASIS_M1830: 05
EATING_REQUIRES_ASSISTANCE: 1
MONEY MANAGEMENT (EXPENSES/BILLS): NEEDS ASSISTANCE
MONEY MANAGEMENT (EXPENSES/BILLS): TOTALLY DEPENDENT
CONTINENCE_REQUIRES_ASSISTANCE: 1
DRESSING_REQUIRES_ASSISTANCE: 1
AMBULATION ASSISTANCE: NON-AMBULATORY
DRESSING_REQUIRES_ASSISTANCE: 1
MONEY MANAGEMENT (EXPENSES/BILLS): INDEPENDENT
BATHING_REQUIRES_ASSISTANCE: 1
AMBULATION_REQUIRES_ASSISTANCE: 1
MONEY MANAGEMENT (EXPENSES/BILLS): INDEPENDENT
CONTINENCE_REQUIRES_ASSISTANCE: 1
MONEY MANAGEMENT (EXPENSES/BILLS): INDEPENDENT
MONEY MANAGEMENT (EXPENSES/BILLS): INDEPENDENT
OASIS_M1830: 02

## 2022-01-01 ASSESSMENT — CHA2DS2 SCORE
VASCULAR DISEASE: NO
CHA2DS2 VASC SCORE: 4
DIABETES: NO
PRIOR STROKE OR TIA OR THROMBOEMBOLISM: NO
AGE 65 TO 74: NO
AGE 75 OR GREATER: YES
SEX: FEMALE
CHF OR LEFT VENTRICULAR DYSFUNCTION: NO
HYPERTENSION: YES

## 2022-01-01 ASSESSMENT — PAIN DESCRIPTION - PAIN TYPE
TYPE: ACUTE PAIN

## 2022-01-01 ASSESSMENT — FIBROSIS 4 INDEX
FIB4 SCORE: 6.14
FIB4 SCORE: 6.38
FIB4 SCORE: 4.05
FIB4 SCORE: 6.82
FIB4 SCORE: 7.62
FIB4 SCORE: 4.38
FIB4 SCORE: 5.81
FIB4 SCORE: 7.72
FIB4 SCORE: 7.72
FIB4 SCORE: 6.92
FIB4 SCORE: 5.32
FIB4 SCORE: 2.88
FIB4 SCORE: 6.666666666666666667
FIB4 SCORE: 7.62
FIB4 SCORE: 5.32
FIB4 SCORE: 4.38
FIB4 SCORE: 7.62

## 2022-01-01 ASSESSMENT — SOCIAL DETERMINANTS OF HEALTH (SDOH)
ACTIVE STRESSOR - EXHAUSTION: 1
ACTIVE STRESSOR - HEALTH CHANGES: 1
ACTIVE STRESSOR - EXHAUSTION: 1
ACTIVE STRESSOR - HEALTH CHANGES: 1

## 2022-08-31 PROBLEM — R79.89 ELEVATED BRAIN NATRIURETIC PEPTIDE (BNP) LEVEL: Status: ACTIVE | Noted: 2022-01-01

## 2022-08-31 PROBLEM — R06.02 SHORTNESS OF BREATH: Status: ACTIVE | Noted: 2022-01-01

## 2022-08-31 PROBLEM — E87.1 HYPONATREMIA: Status: ACTIVE | Noted: 2022-01-01

## 2022-08-31 PROBLEM — R79.89 ELEVATED TROPONIN: Status: ACTIVE | Noted: 2022-01-01

## 2022-08-31 PROBLEM — N17.9 AKI (ACUTE KIDNEY INJURY) (HCC): Status: ACTIVE | Noted: 2022-01-01

## 2022-08-31 PROBLEM — M79.89 LEG SWELLING: Status: ACTIVE | Noted: 2022-01-01

## 2022-08-31 NOTE — ED TRIAGE NOTES
"Chief Complaint   Patient presents with   • Leg Swelling     Started about a week ago, bilateral legs   • Shortness of Breath     Started about a week ago, worse when pt first wakes in the AM     BP (!) 151/116   Pulse 96   Temp 36.8 °C (98.2 °F) (Temporal)   Resp 20   Ht 1.676 m (5' 6\")   Wt 80.1 kg (176 lb 9.4 oz)   SpO2 95%   BMI 28.50 kg/m²     Pt arrived w/ above concern, sent here from  for cardiac workup   "

## 2022-09-01 PROBLEM — R06.02 SHORTNESS OF BREATH: Status: RESOLVED | Noted: 2022-01-01 | Resolved: 2022-01-01

## 2022-09-01 PROBLEM — I48.91 NEW ONSET ATRIAL FIBRILLATION (HCC): Status: RESOLVED | Noted: 2022-01-01 | Resolved: 2022-01-01

## 2022-09-01 PROBLEM — R79.89 ELEVATED TROPONIN: Status: RESOLVED | Noted: 2022-01-01 | Resolved: 2022-01-01

## 2022-09-01 PROBLEM — I48.91 NEW ONSET ATRIAL FIBRILLATION (HCC): Status: ACTIVE | Noted: 2022-01-01

## 2022-09-01 NOTE — CARE PLAN
The patient is Stable - Low risk of patient condition declining or worsening    Shift Goals  Clinical Goals: monitor for any SOB  Patient Goals: sleep at least 10 hours    Progress made toward(s) clinical / shift goals:  Pt maintained 92-94% on room air. Cardiac monitor in placed.Pt was seen sleeping from 2300 Hourly rounding in progress    Patient is not progressing towards the following goals:N/A

## 2022-09-01 NOTE — PROGRESS NOTES
0726- call received from tele monitor tech. Pt converted to AFIB about 15 minutes ago.  0727- Dr. Hernandez notified via voalte.  0730- Vitals taken. Pt has no s/sx of distress. Pt A&Ox4. RA. VSS. Per pt she doesn't have a history of AFIB.   EKG order placed per Dr. Hernandez.    Tele monitor called for EKG.

## 2022-09-01 NOTE — ASSESSMENT & PLAN NOTE
With shortness of breath and bilateral lower extremity swelling is concerning for heart failure.  Check echocardiography  Intravenous diuretics.  Daily strict input and output  Daily standing weights.  Daily BMP to watch renal function, electrolytes.  Replace electrolytes as needed.

## 2022-09-01 NOTE — ED PROVIDER NOTES
ED Provider Note      Means of Arrival: Private Vehicle  History obtained from: Patient    CHIEF COMPLAINT  Chief Complaint   Patient presents with    Leg Swelling     Started about a week ago, bilateral legs    Shortness of Breath     Started about a week ago, worse when pt first wakes in the AM       HPI  Aimee Mauro is a 76 y.o. female who presents with shortness of breath, leg swelling.  She reports that this has been progressively worsening over the past week.  She reports swelling to both legs as well as shortness of breath every time she wakes up from sleep.  She denies any fever, cough, chest pain, abdominal pain.  She does report some nausea but denies vomiting.  She denies any diarrhea.  She denies any history of heart failure or other medical problems.  She does not take any daily medicines.  She does not take recreational drugs or alcohol.    REVIEW OF SYSTEMS  CONSTITUTIONAL:  No fever.  CARDIOVASCULAR:  See HPI  RESPIRATORY:  See HPI  GASTROINTESTINAL:  No abdominal pain.  GENITOURINARY:   No dysuria.  MUSCULOSKELETAL:  No arthralgia.  NEUROLOGIC:   No headache.    See HPI for further details.   All other systems are negative.     PAST MEDICAL HISTORY  Past Medical History:   Diagnosis Date    Stroke (HCC) 1978       FAMILY HISTORY  Family History   Problem Relation Age of Onset    Cancer Mother        SOCIAL HISTORY   reports that she has never smoked. She has never used smokeless tobacco. She reports current alcohol use. She reports that she does not use drugs.    SURGICAL HISTORY  Past Surgical History:   Procedure Laterality Date    TUBAL COAGULATION LAPAROSCOPIC BILATERAL         CURRENT MEDICATIONS  Home Medications       Reviewed by Rehana Marin PharmD (Pharmacist) on 08/31/22 at 1732  Med List Status: Complete     Medication Last Dose Status   multivitamin (THERAGRAN) Tab 8/31/2022 Active   traZODone (DESYREL) 100 MG Tab 8/30/2022 Active                    ALLERGIES  No Known  "Allergies    PHYSICAL EXAM  VITAL SIGNS: BP (!) 157/66   Pulse 95   Temp 36.8 °C (98.2 °F) (Temporal)   Resp 13   Ht 1.676 m (5' 6\")   Wt 80.1 kg (176 lb 9.4 oz)   SpO2 95%   BMI 28.50 kg/m²    Gen: Alert  HENT: ATNC  Eyes: Normal conjunctiva  Neck: trachea midline  Resp: no respiratory distress, faint bibasilar crackles worse on the left  CV: 3+ pitting edema bilaterally RRR, no m/r/g. Equal radial pulses  Abd: non-distended, non-tender  Ext: No deformities, no tenderness  Psych: normal mood  Neuro: speech fluent , GCS 15      RADIOLOGY/PROCEDURES  DX-CHEST-PORTABLE (1 VIEW)   Final Result      1.  Small left pleural effusion. Adjacent airspace disease may be secondary to atelectasis or developing pneumonia      2.  Retrocardiac opacity with lucency suggests a hiatal hernia.      US-RENAL    (Results Pending)       LABS  Labs Reviewed   CBC WITH DIFFERENTIAL - Abnormal; Notable for the following components:       Result Value    RBC 3.91 (*)     Hemoglobin 11.4 (*)     Hematocrit 32.8 (*)     Platelet Count 76 (*)     Lymphocytes 18.10 (*)     All other components within normal limits    Narrative:     Biotin intake of greater than 5 mg per day may interfere with  troponin levels, causing false low values.   COMP METABOLIC PANEL - Abnormal; Notable for the following components:    Sodium 130 (*)     Bun 38 (*)     Creatinine 2.39 (*)     Calcium 7.9 (*)     Albumin 2.3 (*)     Total Protein 5.7 (*)     All other components within normal limits    Narrative:     Biotin intake of greater than 5 mg per day may interfere with  troponin levels, causing false low values.   TROPONIN - Abnormal; Notable for the following components:    Troponin T 28 (*)     All other components within normal limits    Narrative:     Biotin intake of greater than 5 mg per day may interfere with  troponin levels, causing false low values.   PROBRAIN NATRIURETIC PEPTIDE, NT - Abnormal; Notable for the following components:    NT-proBNP " 1708 (*)     All other components within normal limits    Narrative:     Biotin intake of greater than 5 mg per day may interfere with  troponin levels, causing false low values.   ESTIMATED GFR - Abnormal; Notable for the following components:    GFR (CKD-EPI) 20 (*)     All other components within normal limits    Narrative:     Biotin intake of greater than 5 mg per day may interfere with  troponin levels, causing false low values.        EKG  Results for orders placed or performed during the hospital encounter of 22   EKG   Result Value Ref Range    Report       [facility] Emergency Dept.    Test Date:  2022  Pt Name:    INEZ FARMER               Department: EDS  MRN:        [id number]                      Room:  Gender:     Female                       Technician: [id number]  :        [id number]                   Requested By:ER TRIAGE PROTOCOL  Order #:    [id number]                    Reading MD: Dariel Silva    Measurements  Intervals                                Axis  Rate:       89                           P:          2  IN:         132                          QRS:        52  QRSD:       89                           T:          27  QT:         328  QTc:        400    Interpretive Statements  Sinus arrhythmia  No previous ECG available for comparison  Electronically Signed On 2022 17:09:31 PDT by Dariel Silva          COURSE & MEDICAL DECISION MAKING  Pertinent Labs & Imaging studies reviewed. (See chart for details)    Patient presents with orthopnea, bilateral leg swelling, crackles on exam consistent with new onset heart failure.  No evidence to suggest DVT/PE.  Will screen for liver failure, renal failure as alternate etiologies for the patient's symptoms.    Patient's work-up consistent with heart failure with likely pulmonary edema on the left, as well as pleural effusion.  She also has an elevated creatinine level which I suspect is from  cardiorenal syndrome.   We will obtain an ultrasound of the kidneys to rule out secondary etiologies.  Patient given IV furosemide to initiate her treatment.    Appropriate PPE were worn at this encounter.     FINAL IMPRESSION  1. Acute congestive heart failure, unspecified heart failure type (HCC)    2. SAY (acute kidney injury) (HCC)          Case discussed with Dr. Hopper , who will evaluate the patient for hospitalization. Patient will be hospitalized in guarded condition.      This dictation was created using voice recognition software. The accuracy of the dictation is limited to the abilities of the software. I expect there may be some errors of grammar and possibly content. The nursing notes were reviewed and certain aspects of this information were incorporated into this note.

## 2022-09-01 NOTE — H&P
Hospital Medicine History & Physical Note    Date of Service  8/31/2022    Primary Care Physician  No primary care provider on file.    Consultants  None     Code Status  Full Code    Chief Complaint  Chief Complaint   Patient presents with    Leg Swelling     Started about a week ago, bilateral legs    Shortness of Breath     Started about a week ago, worse when pt first wakes in the AM     History of Presenting Illness  Aimee Mauro is a 76 y.o. female with no significant past medical history who presented 8/31/2022 with shortness of breath and lower extremity swelling.  Patient reports that she has not been feeling well over the past 1-2 week.  She has been having progressively worsening shortness of breath, easy fatigability and bilateral lower extremity swelling over the past 1-2 week.  Shortness of breath is mostly with exertion.  She has both orthopnea and paroxysmal nocturnal dyspnea.  She denies having fevers chills or sputum production.  She denies having lower extremity pain or redness.     I discussed the plan of care with emergency department physician, and the patient.    Review of Systems  Review of Systems   Constitutional:  Positive for malaise/fatigue. Negative for chills and fever.   Eyes:  Negative for discharge and redness.   Respiratory:  Positive for shortness of breath. Negative for cough and stridor.    Cardiovascular:  Positive for orthopnea, leg swelling and PND. Negative for chest pain.   Gastrointestinal:  Negative for abdominal pain and vomiting.   Genitourinary:  Negative for flank pain.   Musculoskeletal:  Negative for myalgias.   Skin: Negative.    Neurological:  Negative for focal weakness.   Endo/Heme/Allergies:  Does not bruise/bleed easily.   Psychiatric/Behavioral:  The patient is not nervous/anxious.      Past Medical History   has a past medical history of Stroke ([facility]) (1978).    Surgical History   has a past surgical history that includes tubal coagulation laparoscopic  bilateral.     Family History  family history includes Cancer in her mother.      Social History   reports that she has never smoked. She has never used smokeless tobacco. She reports current alcohol use. She reports that she does not use drugs.    Allergies  No Known Allergies    Medications  Prior to Admission Medications   Prescriptions Last Dose Informant Patient Reported? Taking?   multivitamin (THERAGRAN) Tab 8/31/2022 at AM  Yes Yes   Sig: Take 1 Tablet by mouth every day.   traZODone (DESYREL) 100 MG Tab 8/30/2022 at PM  Yes Yes   Sig: Take 100 mg by mouth every evening.      Facility-Administered Medications: None     Physical Exam  Temp:  [36.8 °C (98.2 °F)] 36.8 °C (98.2 °F)  Pulse:  [] 76  Resp:  [13-26] 14  BP: (135-168)/() 168/80  SpO2:  [92 %-96 %] 96 %  Blood Pressure : (!) 157/66   Temperature: 36.8 °C (98.2 °F)   Pulse: 95   Respiration: 13   Pulse Oximetry: 95 %     Physical Exam  Constitutional:       General: She is not in acute distress.  HENT:      Head: Normocephalic and atraumatic.      Right Ear: External ear normal.      Left Ear: External ear normal.      Nose: No congestion or rhinorrhea.      Mouth/Throat:      Mouth: Mucous membranes are moist.      Pharynx: No oropharyngeal exudate or posterior oropharyngeal erythema.   Eyes:      General: No scleral icterus.        Right eye: No discharge.         Left eye: No discharge.      Conjunctiva/sclera: Conjunctivae normal.      Pupils: Pupils are equal, round, and reactive to light.   Cardiovascular:      Rate and Rhythm: Tachycardia present.      Heart sounds:     No friction rub. No gallop.   Pulmonary:      Comments: Saturating well on room air.  Reduced air entry bilaterally basally, more on the left side.  Abdominal:      General: Abdomen is flat. There is no distension.      Tenderness: There is no guarding.   Musculoskeletal:         General: Swelling present.      Cervical back: Neck supple. No rigidity. No muscular  tenderness.      Right lower leg: Edema present.      Left lower leg: Edema present.   Skin:     Capillary Refill: Capillary refill takes 2 to 3 seconds.      Coloration: Skin is not jaundiced or pale.      Findings: No bruising or erythema.   Neurological:      Mental Status: She is alert and oriented to person, place, and time.   Psychiatric:         Mood and Affect: Mood normal.         Judgment: Judgment normal.     Laboratory:  Recent Labs     08/31/22  1735   WBC 5.9   RBC 3.91*   HEMOGLOBIN 11.4*   HEMATOCRIT 32.8*   MCV 83.9   MCH 29.2   MCHC 34.8   RDW 41.0   PLATELETCT 76*   MPV 10.9     Recent Labs     08/31/22  1735   SODIUM 130*   POTASSIUM 4.6   CHLORIDE 100   CO2 20   GLUCOSE 96   BUN 38*   CREATININE 2.39*   CALCIUM 7.9*     Recent Labs     08/31/22  1735   ALTSGPT 10   ASTSGOT 21   ALKPHOSPHAT 77   TBILIRUBIN 0.2   GLUCOSE 96         Recent Labs     08/31/22  1735   NTPROBNP 1708*         Recent Labs     08/31/22  1735   TROPONINT 28*     Imaging:  DX-CHEST-PORTABLE (1 VIEW)   Final Result      1.  Small left pleural effusion. Adjacent airspace disease may be secondary to atelectasis or developing pneumonia      2.  Retrocardiac opacity with lucency suggests a hiatal hernia.      US-RENAL    (Results Pending)     I personally reviewed patient EKG, it shows sinus rhythm with a rate of 89, there is half-1 mm ST elevation in lead II, 3, there is half millimeter ST depression in lead I, .    Assessment/Plan:  Justification for Admission Status  I anticipate this patient will require at least two midnights for appropriate medical management, necessitating inpatient admission because presents with acute kidney injury and likely heart failure requiring intravenous diuretics, monitoring renal function and electrolytes    Patient will need a  bed on telemetry, abnormal EKG, elevated troponin    SAY (acute kidney injury) (HCC)- (present on admission)  Assessment & Plan  No known history of chronic  kidney disease  Differentials include progressive medical renal disease versus cardiorenal syndrome  Will check bladder scan, and renal ultrasound  We will start intravenous diuretics for now, monitor urine output  Consider nephrology consult according to renal function trend    Bilateral leg swelling- (present on admission)  Assessment & Plan  With shortness of breath and elevated BNP is concerning for heart failure.  Check echocardiography  Intravenous diuretics.  Daily strict input and output  Daily standing weights.  Daily BMP to watch renal function, electrolytes.  Replace electrolytes as needed.    Shortness of breath- (present on admission)  Assessment & Plan  With bilateral lower extremity swelling and elevated BNP is concerning for heart failure.  Check echocardiography  Intravenous diuretics.  Daily strict input and output  Daily standing weights.  Daily BMP to watch renal function, electrolytes.  Replace electrolytes as needed.     Hyponatremia- (present on admission)  Assessment & Plan  Hypervolemic hyponatremia  I expect Na to improve with diuretics  Continue to monitor    Elevated brain natriuretic peptide (BNP) level- (present on admission)  Assessment & Plan  With shortness of breath and bilateral lower extremity swelling is concerning for heart failure.  Check echocardiography  Intravenous diuretics.  Daily strict input and output  Daily standing weights.  Daily BMP to watch renal function, electrolytes.  Replace electrolytes as needed.    Elevated troponin- (present on admission)  Assessment & Plan  In the indeterminate range  Denies chest pain.  EKG shows sinus rhythm with a rate of 89, there is half-1 mm ST elevation in lead II, 3, there is half millimeter ST depression in lead I, .  Continuous cardiac monitoring    Stat EKG, troponin for chest pain or worsening shortness of breath   Trend troponin  Check echocardiography    VTE prophylaxis: SCDs/TEDs and heparin ppx

## 2022-09-01 NOTE — PROGRESS NOTES
4 Eyes Skin Assessment Completed by KEVIN Weston and KEVIN Heath.    Head WDL  Ears WDL  Nose WDL  Mouth WDL  Neck WDL  Breast/Chest WDL  Shoulder Blades WDL  Spine WDL  (R) Arm/Elbow/Hand WDL  (L) Arm/Elbow/Hand WDL  Abdomen WDL  Groin WDL  Scrotum/Coccyx/Buttocks WDL  (R) Leg Swelling  (L) Leg Swelling  (R) Heel/Foot/Toe WDL  (L) Heel/Foot/Toe WDL          Devices In Places N/A      Interventions In Place N/A    Possible Skin Injury No    Pictures Uploaded Into Epic N/A  Wound Consult Placed N/A  RN Wound Prevention Protocol Ordered No

## 2022-09-01 NOTE — PROGRESS NOTES
Telemetry Shift Summary     Rhythm: SR  Rate: 81-96  Measurements: 0.18/0.08/0.40  Ectopy (reported by Monitor Tech): f PAC, r PVC      Normal Values  Rhythm: Sinus  HR:   Measurements: 0.12-0.20/0.06-0.10/0.30-0.52

## 2022-09-01 NOTE — ASSESSMENT & PLAN NOTE
With shortness of breath and elevated BNP is concerning for heart failure.  Check echocardiography  Intravenous diuretics.  Daily strict input and output  Daily standing weights.  Daily BMP to watch renal function, electrolytes.  Replace electrolytes as needed.

## 2022-09-01 NOTE — PROGRESS NOTES
Pt arrived via gurney, admitted to room 202-2 from ED. Pt is A&Ox4, Oriented to room call light and smoking policy. Reviewed plan of care with the patient and the family. Fall precaution in place. Bed locked. Bet at lowest position. Call light within reach. Encouraged pt the importance to call for assistance. Continue to monitor. Hourly rounding in progress

## 2022-09-01 NOTE — PROGRESS NOTES
"Hospital Medicine Daily Progress Note    Date of Service  9/1/2022    Chief Complaint  Aimee Mauro is a 76 y.o. female admitted 8/31/2022 with shortness of breath    Hospital Course  Per notes, \"76 y.o. female with no significant past medical history who presented 8/31/2022 with shortness of breath and lower extremity swelling.  Patient reports that she has not been feeling well over the past 1-2 week.  She has been having progressively worsening shortness of breath, easy fatigability and bilateral lower extremity swelling over the past 1-2 week.  Shortness of breath is mostly with exertion.  She has both orthopnea and paroxysmal nocturnal dyspnea.  She denies having fevers chills or sputum production.  She denies having lower extremity pain or redness.\"    Interval Problem Update  Still having significant bilateral lower extremity pitting edema, in the setting of SAY.  A. fib detected on telemetry, not on EKG. Does have Atrial premature complexes, would benefit from cardiology follow-up outpatient.  Will need further diuresis, if continues to improve possible DC in 24 to 48 hours.    I have discussed this patient's plan of care and discharge plan at IDT rounds today with Case Management, Nursing, Nursing leadership, and other members of the IDT team.    Consultants/Specialty  None    Code Status  Full Code    Disposition  Patient is not medically cleared for discharge.   Anticipate discharge to to home with close outpatient follow-up.  I have placed the appropriate orders for post-discharge needs.    Review of Systems  Review of Systems   Constitutional:  Positive for malaise/fatigue.   Respiratory:  Positive for shortness of breath.    Cardiovascular:  Positive for orthopnea and leg swelling.   All other systems reviewed and are negative.     Physical Exam  Temp:  [36.3 °C (97.3 °F)-36.8 °C (98.2 °F)] 36.3 °C (97.3 °F)  Pulse:  [] 60  Resp:  [13-26] 18  BP: (119-168)/() 147/99  SpO2:  [92 %-96 %] " 92 %    Physical Exam  Vitals and nursing note reviewed.   Constitutional:       Appearance: Normal appearance.   Cardiovascular:      Rate and Rhythm: Tachycardia present. Rhythm irregular.      Pulses: Normal pulses.   Pulmonary:      Effort: Pulmonary effort is normal.      Breath sounds: Normal breath sounds.   Abdominal:      General: Abdomen is flat. Bowel sounds are normal.      Palpations: Abdomen is soft.   Musculoskeletal:      Right lower leg: Edema present.      Left lower leg: Edema present.   Neurological:      General: No focal deficit present.      Mental Status: She is alert and oriented to person, place, and time. Mental status is at baseline.       Fluids    Intake/Output Summary (Last 24 hours) at 9/1/2022 1408  Last data filed at 9/1/2022 1200  Gross per 24 hour   Intake 220 ml   Output 600 ml   Net -380 ml       Laboratory  Recent Labs     08/31/22  1735 09/01/22  0045   WBC 5.9 5.1   RBC 3.91* 3.81*   HEMOGLOBIN 11.4* 11.0*   HEMATOCRIT 32.8* 32.6*   MCV 83.9 85.6   MCH 29.2 28.9   MCHC 34.8 33.7   RDW 41.0 41.4   PLATELETCT 76* 78*   MPV 10.9 10.1     Recent Labs     08/31/22  1735 09/01/22  0045   SODIUM 130* 130*   POTASSIUM 4.6 4.6   CHLORIDE 100 103   CO2 20 19*   GLUCOSE 96 82   BUN 38* 37*   CREATININE 2.39* 2.27*   CALCIUM 7.9* 7.7*                   Imaging  EC-ECHOCARDIOGRAM COMPLETE W/O CONT   Final Result      US-RENAL   Final Result      1.  Normal renal ultrasound.      2.  Apparent endometrial mass. Recommend further evaluation with pelvic ultrasound.      DX-CHEST-PORTABLE (1 VIEW)   Final Result      1.  Small left pleural effusion. Adjacent airspace disease may be secondary to atelectasis or developing pneumonia      2.  Retrocardiac opacity with lucency suggests a hiatal hernia.           Assessment/Plan  Hyponatremia- (present on admission)  Assessment & Plan  Hypervolemic hyponatremia, asymptomatic and improving  Check TSH    Elevated brain natriuretic peptide (BNP) level-  (present on admission)  Assessment & Plan  With shortness of breath and bilateral lower extremity swelling is concerning for heart failure.  Check echocardiography  Intravenous diuretics.  Daily strict input and output  Daily standing weights.  Daily BMP to watch renal function, electrolytes.  Replace electrolytes as needed.    SAY (acute kidney injury) (HCC)- (present on admission)  Assessment & Plan  No known history of chronic kidney disease, but no recent labs to compare.  I do suspect she has a chronic kidney dysfunction, however unknown baseline.  Differentials include progressive medical renal disease versus cardiorenal syndrome  SAY did improve overnight, however patient is requiring IV Lasix and diuresis for volume overload, still need to stay require close monitoring with labs in a.m.    Bilateral leg swelling- (present on admission)  Assessment & Plan  With shortness of breath and elevated BNP is concerning for heart failure.  Check echocardiography  Intravenous diuretics.  Daily strict input and output  Daily standing weights.  Daily BMP to watch renal function, electrolytes.  Replace electrolytes as needed.    Shortness of breath- (present on admission)  Assessment & Plan  With bilateral lower extremity swelling and elevated BNP is concerning for heart failure.  Continue with IV diuretics, increase to 40 mg  Daily strict input and output  Daily standing weights.  Daily BMP to watch renal function, electrolytes.  Replace electrolytes as needed.     Elevated troponin- (present on admission)  Assessment & Plan  In the indeterminate range  Denies chest pain.  EKG shows sinus rhythm with a rate of 89, there is half-1 mm ST elevation in lead II, 3, there is half millimeter ST depression in lead I, .  Continuous cardiac monitoring    Stat EKG, troponin for chest pain or worsening shortness of breath   Troponin stable  Echocardiogram with no systolic or diastolic dysfunction, normal EF       VTE  prophylaxis: SCDs/TEDs    I have performed a physical exam and reviewed and updated ROS and Plan today (9/1/2022). In review of yesterday's note (8/31/2022), there are no changes except as documented above.

## 2022-09-01 NOTE — PROGRESS NOTES
Telemetry Shift Summary    Rhythm SR/SA  HR Range 70s-90s  Ectopy frequent PAC, rare PVC  Measurements 0.20/0.08/0.40        Normal Values  Rhythm SR  HR Range    Measurements 0.12-0.20 / 0.06-0.10  / 0.30-0.52

## 2022-09-01 NOTE — HOSPITAL COURSE
"Per notes, \"76 y.o. female with no significant past medical history who presented 8/31/2022 with shortness of breath and lower extremity swelling.  Patient reports that she has not been feeling well over the past 1-2 week.  She has been having progressively worsening shortness of breath, easy fatigability and bilateral lower extremity swelling over the past 1-2 week.  Shortness of breath is mostly with exertion.  She has both orthopnea and paroxysmal nocturnal dyspnea.  She denies having fevers chills or sputum production.  She denies having lower extremity pain or redness.\"  "

## 2022-09-01 NOTE — ASSESSMENT & PLAN NOTE
In the indeterminate range  Denies chest pain.  EKG shows sinus rhythm with a rate of 89, there is half-1 mm ST elevation in lead II, 3, there is half millimeter ST depression in lead I, .  Continuous cardiac monitoring    Stat EKG, troponin for chest pain or worsening shortness of breath   Troponin stable  Echocardiogram with no systolic or diastolic dysfunction, normal EF

## 2022-09-01 NOTE — ASSESSMENT & PLAN NOTE
No known history of chronic kidney disease, but no recent labs to compare.  I do suspect she has a chronic kidney dysfunction, however unknown baseline.  Differentials include progressive medical renal disease versus cardiorenal syndrome  SAY did improve overnight, however patient is requiring IV Lasix and diuresis for volume overload, still need to stay require close monitoring with labs in a.m.

## 2022-09-01 NOTE — DISCHARGE PLANNING
Case Management Discharge Planning    Admission Date: 8/31/2022  GMLOS: 3.1  ALOS: 1    6-Clicks ADL Score: 24  6-Clicks Mobility Score: 24      Anticipated Discharge Dispo:      DME Needed: No    Action(s) Taken: OTHER participated in IDT rounds with physician and bedside RN.     Escalations Completed: None    Medically Clear: No    Next Steps: discharge pending medial clearance. Not anticipating any discharge needs.     Barriers to Discharge: medical clearance     Is the patient up for discharge tomorrow: patients discharge is pending echo results. Likely to discharge 9/1 or 9/2.

## 2022-09-01 NOTE — ASSESSMENT & PLAN NOTE
With bilateral lower extremity swelling and elevated BNP is concerning for heart failure.  Continue with IV diuretics, increase to 40 mg  Daily strict input and output  Daily standing weights.  Daily BMP to watch renal function, electrolytes.  Replace electrolytes as needed.

## 2022-09-01 NOTE — ED NOTES
Med rec completed per patient at bedside.   Allergies reviewed.   Patient denies any outpatient antibiotics in the last 30 days.   Preferred pharmacy - [facility] on [address].      Rehana Marin PharmD

## 2022-09-02 NOTE — CARE PLAN
The patient is Stable - Low risk of patient condition declining or worsening    Shift Goals  Clinical Goals: no sob through the night  Patient Goals: sleep for hours  Family Goals: n/a    Progress made toward(s) clinical / shift goals:  no respiratory distress, slept well through the night      Problem: Knowledge Deficit - Standard  Goal: Patient and family/care givers will demonstrate understanding of plan of care, disease process/condition, diagnostic tests and medications  Outcome: Progressing     Problem: Respiratory  Goal: Patient will achieve/maintain optimum respiratory ventilation and gas exchange  Outcome: Progressing

## 2022-09-02 NOTE — DISCHARGE INSTRUCTIONS
Acute Kidney Injury, Adult    Acute kidney injury is a sudden worsening of kidney function. The kidneys are organs that have several jobs. They filter the blood to remove waste products and extra fluid. They also maintain a healthy balance of minerals and hormones in the body, which helps control blood pressure and keep bones strong. With this condition, your kidneys do not do their jobs as well as they should.  This condition ranges from mild to severe. Over time it may develop into long-lasting (chronic) kidney disease. Early detection and treatment may prevent acute kidney injury from developing into a chronic condition.  What are the causes?  Common causes of this condition include:  A problem with blood flow to the kidneys. This may be caused by:  Low blood pressure (hypotension) or shock.  Blood loss.  Heart and blood vessel (cardiovascular) disease.  Severe burns.  Liver disease.  Direct damage to the kidneys. This may be caused by:  Certain medicines.  A kidney infection.  Poisoning.  Being around or in contact with toxic substances.  A surgical wound.  A hard, direct hit to the kidney area.  A sudden blockage of urine flow. This may be caused by:  Cancer.  Kidney stones.  An enlarged prostate in males.  What are the signs or symptoms?  Symptoms of this condition may not be obvious until the condition becomes severe. Symptoms of this condition can include:  Tiredness (lethargy), or difficulty staying awake.  Nausea or vomiting.  Swelling (edema) of the face, legs, ankles, or feet.  Problems with urination, such as:  Abdominal pain, or pain along the side of your stomach (flank).  Decreased urine production.  Decrease in the force of urine flow.  Muscle twitches and cramps, especially in the legs.  Confusion or trouble concentrating.  Loss of appetite.  Fever.  How is this diagnosed?  This condition may be diagnosed with tests, including:  Blood tests.  Urine tests.  Imaging tests.  A test in which a sample  of tissue is removed from the kidneys to be examined under a microscope (kidney biopsy).  How is this treated?  Treatment for this condition depends on the cause and how severe the condition is. In mild cases, treatment may not be needed. The kidneys may heal on their own. In more severe cases, treatment will involve:  Treating the cause of the kidney injury. This may involve changing any medicines you are taking or adjusting your dosage.  Fluids. You may need specialized IV fluids to balance your body's needs.  Having a catheter placed to drain urine and prevent blockages.  Preventing problems from occurring. This may mean avoiding certain medicines or procedures that can cause further injury to the kidneys.  In some cases treatment may also require:  A procedure to remove toxic wastes from the body (dialysis or continuous renal replacement therapy - CRRT).  Surgery. This may be done to repair a torn kidney, or to remove the blockage from the urinary system.  Follow these instructions at home:  Medicines  Take over-the-counter and prescription medicines only as told by your health care provider.  Do not take any new medicines without your health care provider's approval. Many medicines can worsen your kidney damage.  Do not take any vitamin and mineral supplements without your health care provider's approval. Many nutritional supplements can worsen your kidney damage.  Lifestyle  If your health care provider prescribed changes to your diet, follow them. You may need to decrease the amount of protein you eat.  Achieve and maintain a healthy weight. If you need help with this, ask your health care provider.  Start or continue an exercise plan. Try to exercise at least 30 minutes a day, 5 days a week.  Do not use any tobacco products, such as cigarettes, chewing tobacco, and e-cigarettes. If you need help quitting, ask your health care provider.  General instructions  Keep track of your blood pressure. Report changes  in your blood pressure as told by your health care provider.  Stay up to date with immunizations. Ask your health care provider which immunizations you need.  Keep all follow-up visits as told by your health care provider. This is important.  Where to find more information  American Association of Kidney Patients: www.aakp.org  National Kidney Foundation: www.kidney.org  American Kidney Fund: www.akfinc.org  [employer] [facility]:  www.lifeoptions.org  www.kidneyschool.org  Contact a health care provider if:  Your symptoms get worse.  You develop new symptoms.  Get help right away if:  You develop symptoms of worsening kidney disease, which include:  Headaches.  Abnormally dark or light skin.  Easy bruising.  Frequent hiccups.  Chest pain.  Shortness of breath.  End of menstruation in women.  Seizures.  Confusion or altered mental status.  Abdominal or back pain.  Itchiness.  You have a fever.  Your body is producing less urine.  You have pain or bleeding when you urinate.  Summary  Acute kidney injury is a sudden worsening of kidney function.  Acute kidney injury can be caused by problems with blood flow to the kidneys, direct damage to the kidneys, and sudden blockage of urine flow.  Symptoms of this condition may not be obvious until it becomes severe. Symptoms may include edema, lethargy, confusion, nausea or vomiting, and problems passing urine.  This condition can usually be diagnosed with blood tests, urine tests, and imaging tests. Sometimes a kidney biopsy is done to diagnose this condition.  Treatment for this condition often involves treating the underlying cause. It is treated with fluids, medicines, dialysis, diet changes, or surgery.  This information is not intended to replace advice given to you by your health care provider. Make sure you discuss any questions you have with your health care provider.  Document Released: 07/02/2012 Document Revised: 11/30/2018 Document Reviewed:  12/08/2017  [employer] Patient Education © 2020 [employer]          Edema    Edema is an abnormal buildup of fluids in the body tissues and under the skin. Swelling of the legs, feet, and ankles is a common symptom that becomes more likely as you get older. Swelling is also common in looser tissues, like around the eyes. When the affected area is squeezed, the fluid may move out of that spot and leave a dent for a few moments. This dent is called pitting edema.  There are many possible causes of edema. Eating too much salt (sodium) and being on your feet or sitting for a long time can cause edema in your legs, feet, and ankles. Hot weather may make edema worse. Common causes of edema include:  Heart failure.  Liver or kidney disease.  Weak leg blood vessels.  Cancer.  An injury.  Pregnancy.  Medicines.  Being obese.  Low protein levels in the blood.  Edema is usually painless. Your skin may look swollen or shiny.  Follow these instructions at home:  Keep the affected body part raised (elevated) above the level of your heart when you are sitting or lying down.  Do not sit still or stand for long periods of time.  Do not wear tight clothing. Do not wear garters on your upper legs.  Exercise your legs to get your circulation going. This helps to move the fluid back into your blood vessels, and it may help the swelling go down.  Wear elastic bandages or support stockings to reduce swelling as told by your health care provider.  Eat a low-salt (low-sodium) diet to reduce fluid as told by your health care provider.  Depending on the cause of your swelling, you may need to limit how much fluid you drink (fluid restriction).  Take over-the-counter and prescription medicines only as told by your health care provider.  Contact a health care provider if:  Your edema does not get better with treatment.  You have heart, liver, or kidney disease and have symptoms of edema.  You have sudden and unexplained weight gain.  Get help  right away if:  You develop shortness of breath or chest pain.  You cannot breathe when you lie down.  You develop pain, redness, or warmth in the swollen areas.  You have heart, liver, or kidney disease and suddenly get edema.  You have a fever and your symptoms suddenly get worse.  Summary  Edema is an abnormal buildup of fluids in the body tissues and under the skin.  Eating too much salt (sodium) and being on your feet or sitting for a long time can cause edema in your legs, feet, and ankles.  Keep the affected body part raised (elevated) above the level of your heart when you are sitting or lying down.  This information is not intended to replace advice given to you by your health care provider. Make sure you discuss any questions you have with your health care provider.  Document Released: 12/18/2006 Document Revised: 12/21/2018 Document Reviewed: 01/20/2018  Elsevier Patient Education © 2020 [employer]      Heart-Healthy Eating Plan    Many factors influence your heart (coronary) health, including eating and exercise habits. Coronary risk increases with abnormal blood fat (lipid) levels. Heart-healthy meal planning includes limiting unhealthy fats, increasing healthy fats, and making other diet and lifestyle changes.  What is my plan?  Your health care provider may recommend that you:  Limit your fat intake to _________% or less of your total calories each day.  Limit your saturated fat intake to _________% or less of your total calories each day.  Limit the amount of cholesterol in your diet to less than _________ mg per day.  What are tips for following this plan?  Cooking  Cook foods using methods other than frying. Baking, boiling, grilling, and broiling are all good options. Other ways to reduce fat include:  Removing the skin from poultry.  Removing all visible fats from meats.  Steaming vegetables in water or broth.  Meal planning    At meals, imagine dividing your plate into fourths:  Fill one-half  of your plate with vegetables and green salads.  Fill one-fourth of your plate with whole grains.  Fill one-fourth of your plate with lean protein foods.  Eat 4-5 servings of vegetables per day. One serving equals 1 cup raw or cooked vegetable, or 2 cups raw leafy greens.  Eat 4-5 servings of fruit per day. One serving equals 1 medium whole fruit, ¼ cup dried fruit, ½ cup fresh, frozen, or canned fruit, or ½ cup 100% fruit juice.  Eat more foods that contain soluble fiber. Examples include apples, broccoli, carrots, beans, peas, and barley. Aim to get 25-30 g of fiber per day.  Increase your consumption of legumes, nuts, and seeds to 4-5 servings per week. One serving of dried beans or legumes equals ½ cup cooked, 1 serving of nuts is ¼ cup, and 1 serving of seeds equals 1 tablespoon.  Fats  Choose healthy fats more often. Choose monounsaturated and polyunsaturated fats, such as olive and canola oils, flaxseeds, walnuts, almonds, and seeds.  Eat more omega-3 fats. Choose salmon, mackerel, sardines, tuna, flaxseed oil, and ground flaxseeds. Aim to eat fish at least 2 times each week.  Check food labels carefully to identify foods with trans fats or high amounts of saturated fat.  Limit saturated fats. These are found in animal products, such as meats, butter, and cream. Plant sources of saturated fats include palm oil, palm kernel oil, and coconut oil.  Avoid foods with partially hydrogenated oils in them. These contain trans fats. Examples are stick margarine, some tub margarines, cookies, crackers, and other baked goods.  Avoid fried foods.  General information  Eat more home-cooked food and less restaurant, buffet, and fast food.  Limit or avoid alcohol.  Limit foods that are high in starch and sugar.  Lose weight if you are overweight. Losing just 5-10% of your body weight can help your overall health and prevent diseases such as diabetes and heart disease.  Monitor your salt (sodium) intake, especially if you  have high blood pressure. Talk with your health care provider about your sodium intake.  Try to incorporate more vegetarian meals weekly.  What foods can I eat?  Fruits  All fresh, canned (in natural juice), or frozen fruits.  Vegetables  Fresh or frozen vegetables (raw, steamed, roasted, or grilled). Green salads.  Grains  Most grains. Choose whole wheat and whole grains most of the time. Rice and pasta, including brown rice and pastas made with whole wheat.  Meats and other proteins  Lean, well-trimmed beef, veal, pork, and lamb. Chicken and turkey without skin. All fish and shellfish. Wild duck, rabbit, pheasant, and venison. Egg whites or low-cholesterol egg substitutes. Dried beans, peas, lentils, and tofu. Seeds and most nuts.  Dairy  Low-fat or nonfat cheeses, including ricotta and mozzarella. Skim or 1% milk (liquid, powdered, or evaporated). Buttermilk made with low-fat milk. Nonfat or low-fat yogurt.  Fats and oils  Non-hydrogenated (trans-free) margarines. Vegetable oils, including soybean, sesame, sunflower, olive, peanut, safflower, corn, canola, and cottonseed. Salad dressings or mayonnaise made with a vegetable oil.  Beverages  Water (mineral or sparkling). Coffee and tea. Diet carbonated beverages.  Sweets and desserts  Sherbet, gelatin, and fruit ice. Small amounts of dark chocolate.  Limit all sweets and desserts.  Seasonings and condiments  All seasonings and condiments.  The items listed above may not be a complete list of foods and beverages you can eat. Contact a dietitian for more options.  What foods are not recommended?  Fruits  Canned fruit in heavy syrup. Fruit in cream or butter sauce. Fried fruit. Limit coconut.  Vegetables  Vegetables cooked in cheese, cream, or butter sauce. Fried vegetables.  Grains  Breads made with saturated or trans fats, oils, or whole milk. Croissants. Sweet rolls. Donuts. High-fat crackers, such as cheese crackers.  Meats and other proteins  Fatty meats, such as  hot dogs, ribs, sausage, marcano, rib-eye roast or steak. High-fat deli meats, such as salami and bologna. Caviar. Domestic duck and goose. Organ meats, such as liver.  Dairy  Cream, sour cream, cream cheese, and creamed cottage cheese. Whole milk cheeses. Whole or 2% milk (liquid, evaporated, or condensed). Whole buttermilk. Cream sauce or high-fat cheese sauce. Whole-milk yogurt.  Fats and oils  Meat fat, or shortening. Cocoa butter, hydrogenated oils, palm oil, coconut oil, palm kernel oil. Solid fats and shortenings, including marcano fat, salt pork, lard, and butter. Nondairy cream substitutes. Salad dressings with cheese or sour cream.  Beverages  Regular sodas and any drinks with added sugar.  Sweets and desserts  Frosting. Pudding. Cookies. Cakes. Pies. Milk chocolate or white chocolate. Buttered syrups. Full-fat ice cream or ice cream drinks.  The items listed above may not be a complete list of foods and beverages to avoid. Contact a dietitian for more information.  Summary  Heart-healthy meal planning includes limiting unhealthy fats, increasing healthy fats, and making other diet and lifestyle changes.  Lose weight if you are overweight. Losing just 5-10% of your body weight can help your overall health and prevent diseases such as diabetes and heart disease.  Focus on eating a balance of foods, including fruits and vegetables, low-fat or nonfat dairy, lean protein, nuts and legumes, whole grains, and heart-healthy oils and fats.  This information is not intended to replace advice given to you by your health care provider. Make sure you discuss any questions you have with your health care provider.  Document Released: 09/26/2009 Document Revised: 01/25/2019 Document Reviewed: 01/25/2019  Elsevier Patient Education © 2020 Elsevier Inc.

## 2022-09-02 NOTE — PROGRESS NOTES
Discharge to home with spouse . Pt signed a copy of the discharge papers and confirms all questions have been answered. Second copy of d/c papers in chart.  Prescriptions sent to pts pharmacy. IV discontinued. Pt states all person belongings are in possession.

## 2022-09-02 NOTE — PROGRESS NOTES
Telemetry Shift Summary     Rhythm SR  HR Range 72-90  Ectopy rPVC;fPAC  Measurements 0.18/0.08/0.40           Normal Values  Rhythm SR  HR Range    Measurements 0.12-0.20 / 0.06-0.10  / 0.30-0.52

## 2022-09-02 NOTE — PROGRESS NOTES
Telemetry Shift Summary     Rhythm: SR  Rate: 76-96  Measurements: .18/.08/.42  Ectopy (reported by Monitor Tech): f PAC, r PVC     Normal Values  Rhythm: Sinus  HR:   Measurements: 0.12-0.20/0.06-0.10/0.30-0.52

## 2022-09-02 NOTE — CARE PLAN
The patient is Watcher - Medium risk of patient condition declining or worsening    Shift Goals  Clinical Goals: no SOB this shift  Patient Goals: rest    Progress made toward(s) clinical / shift goals:  no falls this shift.    Patient is not progressing towards the following goals:    Pt heart rhythm abnormal. Multiple Ekgs done this shift. Dr. Hernandez aware. Pt shows no s/sx of distress.

## 2022-09-02 NOTE — DISCHARGE SUMMARY
"Discharge Summary    CHIEF COMPLAINT ON ADMISSION  Chief Complaint   Patient presents with    Leg Swelling     Started about a week ago, bilateral legs    Shortness of Breath     Started about a week ago, worse when pt first wakes in the AM       Reason for Admission  Abnormal EKG     Admission Date  8/31/2022    CODE STATUS  Full Code    HPI & HOSPITAL COURSE  Per notes, \"76 y.o. female with no significant past medical history who presented 8/31/2022 with shortness of breath and lower extremity swelling.  Patient reports that she has not been feeling well over the past 1-2 week.  She has been having progressively worsening shortness of breath, easy fatigability and bilateral lower extremity swelling over the past 1-2 week.  Shortness of breath is mostly with exertion.  She has both orthopnea and paroxysmal nocturnal dyspnea.  She denies having fevers chills or sputum production.  She denies having lower extremity pain or redness.\"    Patient was admitted for elevated BNP and volume overload and started on IV Lasix.  An echocardiogram left ventricular EF of 65%, normal systolic function, mildly dilated left atrium and mild eccentric aortic insufficiency.  A. fib detected on telemetry, not on EKG. Does have Atrial premature complexes, would benefit from cardiology follow-up outpatient.  Patient responded well to diuresis and was able to DC with close follow-up in the outpatient setting.  I stressed the importance of following up with cardiology and PCP.  Additionally on her labs she was found to have kidney injury, it is unclear if this is chronic or acute, however has been stable even on diuretics so I do believe she has a component of chronic kidney injury, which has previously been undetected due to no follow-up.  I recommend she follow closely for this in the outpatient setting.    Therefore, she is discharged in good and stable condition to home with close outpatient follow-up.    The patient met 2-midnight " criteria for an inpatient stay at the time of discharge.    Discharge Date  9/2/2022    FOLLOW UP ITEMS POST DISCHARGE  Follow-up with PCP  Follow-up with cardiology to establish care    DISCHARGE DIAGNOSES  Active Problems:    Elevated troponin POA: Yes    Shortness of breath POA: Yes    Bilateral leg swelling POA: Yes    SAY (acute kidney injury) (HCC) POA: Yes    Elevated brain natriuretic peptide (BNP) level POA: Yes    Hyponatremia POA: Yes  Resolved Problems:    * No resolved hospital problems. *      FOLLOW UP  Future Appointments   Date Time Provider Department Center   9/6/2022  9:40 AM José Tian D.O. [facility]   9/20/2022  1:15 PM Kendall Merino M.D. [facility] None     Markell Rivera M.D.  [address] [address]  [phone]    Follow up in 1 week(s)        MEDICATIONS ON DISCHARGE     Medication List        START taking these medications        Instructions   carvedilol 3.125 MG Tabs  Commonly known as: COREG   Take 1 Tablet by mouth 2 times a day with meals for 30 days.  Dose: 3.125 mg            CONTINUE taking these medications        Instructions   multivitamin Tabs   Take 1 Tablet by mouth every day.  Dose: 1 Tablet     traZODone 100 MG Tabs  Commonly known as: DESYREL   Take 100 mg by mouth every evening.  Dose: 100 mg              Allergies  No Known Allergies    DIET  Orders Placed This Encounter   Procedures    Diet Order Diet: Cardiac; Second Modifier: (optional): Renal     Standing Status:   Standing     Number of Occurrences:   1     Order Specific Question:   Diet:     Answer:   Cardiac [6]     Order Specific Question:   Second Modifier: (optional)     Answer:   Renal [8]       ACTIVITY  As tolerated.  Weight bearing as tolerated    CONSULTATIONS  Cardiology     PROCEDURES  None    LABORATORY  Lab Results   Component Value Date    SODIUM 133 (L) 09/02/2022    POTASSIUM 4.2 09/02/2022    CHLORIDE 104 09/02/2022    CO2 17 (L) 09/02/2022    GLUCOSE  83 09/02/2022    BUN 39 (H) 09/02/2022    CREATININE 2.35 (H) 09/02/2022        Lab Results   Component Value Date    WBC 5.1 09/01/2022    HEMOGLOBIN 11.0 (L) 09/01/2022    HEMATOCRIT 32.6 (L) 09/01/2022    PLATELETCT 78 (L) 09/01/2022        Total time of the discharge process exceeds 45 minutes.

## 2022-09-02 NOTE — DISCHARGE PLANNING
SANJAY Gayle met pt bedside to introduce CCM services. Completed SDOH screening and inpatient assessment. Pt mentions she has great support from family. No medical equipment used at home. Pt informed of [facility] benefit and agreed to referral sent out. Pt is confident in ability to manage care post d/c. No issues keeping appointments or financial barriers to care. Pt denies need for resources such as food, transportation or housing. CCM contact info left with pt bedside.     Community Health Worker Intake  Social determinates of health intake completed.   Identified barriers to none.  Contact information provided to Aimee Mauro. Yes   Has PCP appointment scheduled for 9/6/22  Scheduled Food Delivery/Home Visit/Outpatient Visit: No  Accepted/Declined Med's-To-Beds. N/a  Inpatient/Outpatient assessment completed. Inpatient   Did the patient receive medications post discharge:     Plan: [facility] referral as all needs met.

## 2022-09-02 NOTE — PROGRESS NOTES
Received in bed, aox4, sr on monitor. Assessment as per [facility]. Call light within reach. Needs attended. Plan of care discussed and understood.

## 2022-09-06 PROBLEM — E83.51 HYPOCALCEMIA: Status: ACTIVE | Noted: 2022-01-01

## 2022-09-06 PROBLEM — Z09 HOSPITAL DISCHARGE FOLLOW-UP: Status: ACTIVE | Noted: 2022-01-01

## 2022-09-06 PROBLEM — N85.8 UTERINE MASS: Status: ACTIVE | Noted: 2022-01-01

## 2022-09-06 PROBLEM — R06.02 SHORTNESS OF BREATH: Status: RESOLVED | Noted: 2022-01-01 | Resolved: 2022-01-01

## 2022-09-06 PROBLEM — F51.01 PRIMARY INSOMNIA: Status: ACTIVE | Noted: 2022-01-01

## 2022-09-06 NOTE — PROGRESS NOTES
"Subjective:     CC:  Diagnoses of Uterine mass, Primary insomnia, Hospital discharge follow-up, SAY (acute kidney injury) (Formerly [facility]), Hypocalcemia, Elevated troponin, Healthcare maintenance, and Abnormal finding of blood chemistry, unspecified  were pertinent to this visit.    HISTORY OF THE PRESENT ILLNESS: Patient is a 76 y.o. female with the following medical problems   Past Medical History:   Diagnosis Date    Stroke (HCC) 1978     . This pleasant patient is here today to establish care and discuss the following;    Problem   Uterine Mass    Patient had any incidental finding for uterine mass measuring 4.8 x 3.7 x 4.3 cm found on renal US. No un-intentional weight loss, no night sweats or fevers      Primary Insomnia    Diagnosed over years ago. Has difficulty staying and falling asleep. Currently on trazodone 100mg     Hospital Discharge Follow-Up    Discharged on 9/2/22. Discharge summary posted below    \"76 y.o. female with no significant past medical history who presented 8/31/2022 with shortness of breath and lower extremity swelling.  Patient reports that she has not been feeling well over the past 1-2 week.  She has been having progressively worsening shortness of breath, easy fatigability and bilateral lower extremity swelling over the past 1-2 week.  Shortness of breath is mostly with exertion.  She has both orthopnea and paroxysmal nocturnal dyspnea.  She denies having fevers chills or sputum production.  She denies having lower extremity pain or redness.\"     Patient was admitted for elevated BNP and volume overload and started on IV Lasix.  An echocardiogram left ventricular EF of 65%, normal systolic function, mildly dilated left atrium and mild eccentric aortic insufficiency.  A. fib detected on telemetry, not on EKG. Does have Atrial premature complexes, would benefit from cardiology follow-up outpatient.  Patient responded well to diuresis and was able to DC with close follow-up in the outpatient " "setting.  I stressed the importance of following up with cardiology and PCP.  Additionally on her labs she was found to have kidney injury, it is unclear if this is chronic or acute, however has been stable even on diuretics so I do believe she has a component of chronic kidney injury, which has previously been undetected due to no follow-up\"    She was feeling fine for the most part until this morning where she felt slightly short of breath and not herself. She is not feeling fine.     Hypocalcemia   Delvis (Acute Kidney Injury) (Hcc)    -Creatine around 2.3,   -Renal US was reported as normal     Shortness of Breath (Resolved)       Current Outpatient Medications Ordered in Epic   Medication Sig Dispense Refill    carvedilol (COREG) 3.125 MG Tab Take 1 Tablet by mouth 2 times a day with meals for 30 days. 60 Tablet 0    traZODone (DESYREL) 100 MG Tab Take 100 mg by mouth every evening.      multivitamin (THERAGRAN) Tab Take 1 Tablet by mouth every day.       No current [facility]-ordered facility-administered medications on file.         ROS:   Review of Systems   Constitutional:  Negative for chills and fever.   Respiratory:  Negative for shortness of breath.    Cardiovascular:  Negative for chest pain.       Objective:     Exam: /78 (BP Location: Right arm, Patient Position: Sitting, BP Cuff Size: Adult)   Pulse 74   Temp 36.7 °C (98 °F) (Temporal)   Ht 1.676 m (5' 6\")   Wt 77.6 kg (171 lb)   SpO2 95%  Body mass index is 27.6 kg/m².    Physical Exam          Assessment & Plan:     Problem List Items Addressed This Visit       DELVIS (acute kidney injury) (HCC)     -repeat kidney function  -I suspect this is more chronic   -refer to nephrology         Relevant Orders    Comp Metabolic Panel    URINALYSIS    Referral to Nephrology    Elevated troponin    Relevant Orders    Lipid Profile    TROPONIN    Hospital discharge follow-up     -patient will be following with cardiology on 9/20/22         Hypocalcemia    " Relevant Orders    PTH INTACT (PTH ONLY)    URINE CALCIUM    Primary insomnia     Chronic  -continue trazodone         Uterine mass     New diagnosis uncertain prognosis   -refer to gyn         Relevant Orders    Referral to OB/Gyn     Other Visit Diagnoses       Healthcare maintenance        Relevant Orders    HEMOGLOBIN A1C    Abnormal finding of blood chemistry, unspecified         Relevant Orders    Lipid Profile                Return in about 1 week (around 9/13/2022) for F/U Labs.    Please note that this dictation was created using voice recognition software. I have made every reasonable attempt to correct obvious errors, but I expect that there are errors of grammar and possibly content that I did not discover before finalizing the note.

## 2022-09-08 NOTE — TELEPHONE ENCOUNTER
----- Message from José Tian D.O. sent at 9/8/2022 10:15 AM PDT -----  Can you please let patient know that her sodium levels are low along with her kidney function is not improving much to prevent further decline of her kidney function I am going to prescribe a medication called sodium bicarb.  I very much want her to follow-up with nephrology.  Also going to order 1 more blood test for her to complete which is going to be nonfasting.  I will talk to her further at her next appointment on 9/13/2022

## 2022-09-16 PROBLEM — I48.91 ATRIAL FIBRILLATION (HCC): Status: ACTIVE | Noted: 2022-01-01

## 2022-09-16 PROBLEM — N18.4 STAGE 4 CHRONIC KIDNEY DISEASE (HCC): Status: ACTIVE | Noted: 2022-01-01

## 2022-09-16 PROBLEM — N25.81 HYPERPARATHYROIDISM, SECONDARY (HCC): Status: ACTIVE | Noted: 2022-01-01

## 2022-09-16 NOTE — PROGRESS NOTES
"Subjective:     CC: Lab Results     HPI:   Aimee presents today for the following;    Problem   Atrial Fibrillation (Hcc)    Patient had an episode of afib on telemetry. She had an echo that did not report MR. She is on coreg. She not on blood thinners currently. She has an appointment with cardiology on 9/20.     Hyperparathyroidism, Secondary (Hcc)    Secondary CKD stage IV     Stage 4 Chronic Kidney Disease (Hcc)    Labs from 9/7/22  -Creatine 2.8  -GFR 18    Patient had an echo ruling out heart failure. Retroperionteal US did not mention hydronephrosis.          Current Outpatient Medications Ordered in Epic   Medication Sig Dispense Refill    apixaban (ELIQUIS) 5mg Tab Take 1 Tablet by mouth 2 times a day. 60 Tablet 0    traZODone (DESYREL) 100 MG Tab Take 1 Tablet by mouth every evening. 90 Tablet 2    sodium bicarbonate 325 MG Tab Take 1 Tablet by mouth 2 times a day for 90 days. 180 Tablet 2    carvedilol (COREG) 3.125 MG Tab Take 1 Tablet by mouth 2 times a day with meals for 30 days. 60 Tablet 0    multivitamin (THERAGRAN) Tab Take 1 Tablet by mouth every day.       No current [facility]-ordered facility-administered medications on file.           ROS:  Review of Systems   Constitutional:  Negative for chills and fever.   Respiratory:  Negative for shortness of breath.    Cardiovascular:  Negative for chest pain.     Objective:     Exam:  /84 (BP Location: Left arm, Patient Position: Sitting, BP Cuff Size: Adult)   Pulse 83   Temp 36.1 °C (96.9 °F) (Temporal)   Ht 1.676 m (5' 6\")   Wt 81.6 kg (180 lb)   SpO2 96%   BMI 29.05 kg/m²  Body mass index is 29.05 kg/m².    Physical Exam  Constitutional:       General: She is not in acute distress.     Appearance: She is not ill-appearing.   Cardiovascular:      Rate and Rhythm: Normal rate and regular rhythm.      Pulses: Normal pulses.      Heart sounds:     No friction rub. No gallop.   Pulmonary:      Effort: Pulmonary effort is normal.   Neurological: "      Mental Status: She is alert.   Psychiatric:         Mood and Affect: Mood normal.         Behavior: Behavior normal.         Thought Content: Thought content normal.         Judgment: Judgment normal.             Assessment & Plan:     Problem List Items Addressed This Visit       Atrial fibrillation (HCC)     New problem  -CHADvasc score of 3   -continue coreg 3.125mg BID  -we will start eliquis 5 mg BID, patient is not not over the age 78 and her weight is greater than 60kg  -side effects explained to patient such as increased risk for bleeding  -patient husbands admits that she does snore, I will refer her to sleep medicine for further evaluation  -follow up with cardiology         Relevant Medications    apixaban (ELIQUIS) 5mg Tab    Other Relevant Orders    Referral to Pulmonary and Sleep Medicine    Hyperparathyroidism, secondary (HCC)    Stage 4 chronic kidney disease (HCC)     Chronic-worsening  -I suspect patient to have chronic kidney disease due hypocalcemia and elevated PTH             Other Visit Diagnoses       Snoring        Relevant Orders    Referral to Pulmonary and Sleep Medicine                Return in about 1 month (around 10/16/2022).    Please note that this dictation was created using voice recognition software. I have made every reasonable attempt to correct obvious errors, but I expect that there are errors of grammar and possibly content that I did not discover before finalizing the note.

## 2022-09-16 NOTE — ASSESSMENT & PLAN NOTE
New problem  -CHADvasc score of 3   -continue coreg 3.125mg BID  -we will start eliquis 5 mg BID, patient is not not over the age 78 and her weight is greater than 60kg  -side effects explained to patient such as increased risk for bleeding  -patient husbands admits that she does snore, I will refer her to sleep medicine for further evaluation  -follow up with cardiology

## 2022-09-16 NOTE — ASSESSMENT & PLAN NOTE
Chronic-worsening  -I suspect patient to have chronic kidney disease due hypocalcemia and elevated PTH

## 2022-09-19 PROBLEM — J90 PLEURAL EFFUSION: Status: ACTIVE | Noted: 2022-01-01

## 2022-09-19 PROBLEM — E55.9 VITAMIN D DEFICIENCY: Status: ACTIVE | Noted: 2022-01-01

## 2022-09-19 PROBLEM — I10 HYPERTENSION: Status: ACTIVE | Noted: 2022-01-01

## 2022-09-19 PROBLEM — I48.0 PAROXYSMAL ATRIAL FIBRILLATION (HCC): Status: ACTIVE | Noted: 2022-01-01

## 2022-09-19 PROBLEM — D69.6 THROMBOCYTOPENIA (HCC): Status: ACTIVE | Noted: 2022-01-01

## 2022-09-19 PROBLEM — N39.0 UTI (URINARY TRACT INFECTION): Status: ACTIVE | Noted: 2022-01-01

## 2022-09-19 NOTE — ASSESSMENT & PLAN NOTE
- Mild elevation, continue to trend and monitor patient on telemetry (stable)   - No WMA on recent echo  - Check VQ  - May be secondary to renal dysfunction  - Stable

## 2022-09-19 NOTE — ASSESSMENT & PLAN NOTE
- Significant hyponatremia which has worsened from recent discharge  - She is not on medications to cause hyponatremia  - Improving with Lasix, likely hypervolemic hyponatremia clinically  - Unfortunately, was given Lasix with admission, so urine sodium no longer reliable  -Obtain BMP every 6 hours, avoid increasing sodium level more than 8/day  - [place] volume restriction and 2gm sodium diet   - Check TSH and cortisol level   - Echo largely normal, though diastolic dysfunction could not be assessed - check INR, abd u/s to eval for cirrhosis/splenomegaly contributing.  Can also certainly have contribution from her renal dysfunction.   - Has significant hypoalbuminemia as well that may be contributing

## 2022-09-19 NOTE — ASSESSMENT & PLAN NOTE
- Currently unclear etiology - check ADAMS13, retic count, LDH, hapto, fibrinogen and abdominal ultrasound  - No s/s of bleeding  - May need BMB if testing unrevealing

## 2022-09-19 NOTE — PROGRESS NOTES
Assumed care of pt, received bedside report from KEVIN Menard. Pt sitting up in bed, no complaints of pain, room air, A/Ox4. Fall and safety precautions in place, strip alarm in place. Discussed POC with pt, pt verbalizes understanding. No further needs at this time.

## 2022-09-19 NOTE — H&P
Hospital Medicine History & Physical Note    Date of Service  9/18/2022    Primary Care Physician  José Tian D.O.    Consultants  None    Specialist Names: None    Code Status  Full Code    Chief Complaint  Chief Complaint   Patient presents with    Shortness of Breath    Weakness     Pt states she had a sudden onset of shortness of breath and weakness that started about an hour prior to arrival. Pt is poor historian.        History of Presenting Illness  Aimee Mauro is a 76 y.o. female who presented 9/18/2022 with shortness of breath.  At the time of my exam, patient is somewhat confused.  I did receive a lot of information from family at bedside, as well as the chart.  Family states she is hard of hearing however even whenever I get closer she tends to not answer my questions adequately.  Patient was recently here, noted to have lower extremity edema, was thought it was CHF however had normal ejection fraction on echocardiogram.  Patient continues to have lower extremity edema which seem to be worsened so she came back to the emergency department.  Patient apparently was having shortness of breath as well which persisted.  Patient has not complained of any chest pain.  She was not hypoxic upon arrival.  ERP did obtain a D-dimer which was elevated however patient does have elevated creatinine, unable to get CTA of the chest.  I did discuss the case including labs and imaging with the ER physician.    I discussed the plan of care with family.    Review of Systems  Review of Systems   Unable to perform ROS: Mental acuity     Past Medical History   has a past medical history of CKD (chronic kidney disease), stage IV (HCC), Congestive heart failure (HCC), Insomnia disorder, and Stroke (HCC) ([birth year]01/1980).    Surgical History   has a past surgical history that includes tubal coagulation laparoscopic bilateral.     Family History  family history includes Cancer in her mother.   Family history reviewed with  patient. There is no family history that is pertinent to the chief complaint.     Social History   reports that she has never smoked. She has never used smokeless tobacco. She reports current alcohol use. She reports that she does not use drugs.    Allergies  Not on File    Medications  Prior to Admission Medications   Prescriptions Last Dose Informant Patient Reported? Taking?   apixaban (ELIQUIS) 5mg Tab 9/18/2022  No No   Sig: Take 1 Tablet by mouth 2 times a day.   carvedilol (COREG) 3.125 MG Tab 9/18/2022  No No   Sig: Take 1 Tablet by mouth 2 times a day with meals for 30 days.   multivitamin (THERAGRAN) Tab 9/18/2022  Yes No   Sig: Take 1 Tablet by mouth every day.   sodium bicarbonate 325 MG Tab 9/18/2022  No No   Sig: Take 1 Tablet by mouth 2 times a day for 90 days.   traZODone (DESYREL) 100 MG Tab 9/17/2022  No No   Sig: Take 1 Tablet by mouth every evening.      Facility-Administered Medications: None       Physical Exam  Temp:  [36.6 °C (97.8 °F)] 36.6 °C (97.8 °F)  Pulse:  [82] 82  Resp:  [21] 21  BP: (146)/(87) 146/87  SpO2:  [96 %] 96 %  Blood Pressure : (!) 146/87   Temperature: 36.6 °C (97.8 °F)   Pulse: 82   Respiration: (!) 21   Pulse Oximetry: 96 %       Physical Exam  Vitals and nursing note reviewed.   Constitutional:       General: She is not in acute distress.     Appearance: She is well-developed. She is not diaphoretic.   HENT:      Head: Normocephalic and atraumatic.      Right Ear: External ear normal.      Left Ear: External ear normal.      Nose: Nose normal. No congestion or rhinorrhea.      Mouth/Throat:      Mouth: Mucous membranes are moist.      Pharynx: No oropharyngeal exudate.   Eyes:      General:         Right eye: No discharge.         Left eye: No discharge.   Neck:      Trachea: No tracheal deviation.   Cardiovascular:      Rate and Rhythm: Normal rate and regular rhythm.      Heart sounds: No murmur heard.    No friction rub. No gallop.   Pulmonary:      Effort: Pulmonary  effort is normal. No respiratory distress.      Breath sounds: Normal breath sounds. No stridor. No wheezing or rales.   Abdominal:      General: Bowel sounds are normal. There is no distension.      Palpations: Abdomen is soft.   Musculoskeletal:         General: No tenderness. Normal range of motion.      Cervical back: Neck supple.      Right lower leg: Edema present.      Left lower leg: Edema present.      Comments: LE edema, L > R   Lymphadenopathy:      Cervical: No cervical adenopathy.   Skin:     General: Skin is warm and dry.      Findings: No erythema or rash.   Neurological:      Comments: Awake, confused   Psychiatric:         Cognition and Memory: Cognition is impaired. Memory is impaired.       Laboratory:  Recent Labs     09/18/22 2045   WBC 7.5   RBC 4.04*   HEMOGLOBIN 11.6*   HEMATOCRIT 32.4*   MCV 80.2*   MCH 28.7   MCHC 35.8*   RDW 38.7   PLATELETCT 81*   MPV 10.0     Recent Labs     09/18/22 2045   SODIUM 117*   POTASSIUM 4.6   CHLORIDE 87*   CO2 18*   GLUCOSE 96   BUN 48*   CREATININE 2.59*   CALCIUM 7.6*     Recent Labs     09/18/22 2045   ALTSGPT 11   ASTSGOT 20   ALKPHOSPHAT 81   TBILIRUBIN 0.2   GLUCOSE 96         Recent Labs     09/18/22 2045   NTPROBNP 2404*         Recent Labs     09/18/22 2045   TROPONINT 30*       Imaging:  DX-CHEST-PORTABLE (1 VIEW)   Final Result      1.  Bibasilar atelectasis with small bilateral pleural effusions.      2.  Cardiomegaly.      US-EXTREMITY VENOUS LOWER BILAT    (Results Pending)   US-PELVIC COMPLETE (TRANSABDOMINAL/TRANSVAGINAL) (COMBO)    (Results Pending)       X-Ray:  I have personally reviewed the images and compared with prior images.    Assessment/Plan:  Justification for Admission Status  I anticipate this patient will require at least two midnights for appropriate medical management, necessitating inpatient admission because hyponatremia as well as presumed PE    Patient will need a Telemetry bed on MEDICAL service .  The need is  secondary to hyponatremia and presumed PE.    * Hyponatremia- (present on admission)  Assessment & Plan  - Significant hyponatremia which has worsened from recent discharge  -While patient does have a lower extremity edema, I do not think it is worsened so much to cause a significant drop in sodium level  -She is not on medications to cause hyponatremia  -She certainly is not dehydrated so I will give IV Lasix and closely monitor sodium level  -At this point in time, I do not feel she needs hypertonic solution, I feel she is asymptomatic  -Obtain BMP every 6 hours, avoid increasing sodium level more than 8/day    Shortness of breath- (present on admission)  Assessment & Plan  - Significant shortness of breath per patient and family  -Chest x-ray is clear except for very small bilateral pleural effusions that I do not think are causing any dyspnea  -She does have an elevated D-dimer, also has lower extremity swelling, I am concerned she has DVT as well as PE   -unfortunately, unable to get a CTA of the chest due to her kidney function, lower extremity ultrasound is pending  -I am however going to start heparin drip  -Pulse ox monitoring    Uterine mass- (present on admission)  Assessment & Plan  - Recently noted on renal ultrasound  -I am concerned she has PE/DVT, certainly could be due to malignancy, because of this I am going to go ahead and get a pelvic ultrasound for further evaluation    Pleural effusion- (present on admission)  Assessment & Plan  - Noted on chest x-ray, bilateral, small left pleural effusion was present on recent chest x-ray  -I do not feel these are large enough to be causing significant dyspnea  -Start IV Lasix    Hypertension- (present on admission)  Assessment & Plan  - Continue home Coreg  -Start as needed labetalol  -Adjust as needed    Paroxysmal atrial fibrillation (HCC)- (present on admission)  Assessment & Plan  - Patient was noted to have atrial fibrillation on telemetry but not on  EKG  -Patient was started on Eliquis, first dose was today  -Patient will be transition to heparin drip  -Monitor on telemetry  -Has been in sinus in the ER    Stage 4 chronic kidney disease (HCC)- (present on admission)  Assessment & Plan  - Does appear near her baseline  -Repeat BMP in the morning  -Closely monitor and patient will be on IV Lasix  -Has outpatient nephrology appointment on Tuesday    Bilateral leg swelling- (present on admission)  Assessment & Plan  - Patient has ongoing lower extremity swelling, no CHF noted on recent echocardiogram  -Patient states she has had ongoing edema, has not been taking Lasix  -On my exam, left leg was greater than right  -Lower extremity ultrasound is pending  -Elevated D-dimer, concerning for DVT    Elevated troponin- (present on admission)  Assessment & Plan  - Mild elevation, continue to trend and monitor patient on telemetry      VTE prophylaxis: therapeutic anticoagulation with heparin drip

## 2022-09-19 NOTE — ASSESSMENT & PLAN NOTE
-  Patient was noted to have question of afib on telemetry last hospital stay, but not confirmed with EKG.  Has been sinus rhythm since admission here - will hold Eliquis for now given lack of confirmation of afib and concurrent thrombocytopenia, and continue to monitor on tele   -Monitor on telemetry

## 2022-09-19 NOTE — ED TRIAGE NOTES
Chief Complaint   Patient presents with    Shortness of Breath    Weakness     Pt states she had a sudden onset of shortness of breath and weakness that started about an hour prior to arrival. Pt is poor historian.

## 2022-09-19 NOTE — ASSESSMENT & PLAN NOTE
- Noted on chest x-ray, bilateral, small left pleural effusion was present on recent chest x-ray  -I do not feel these are large enough to be causing significant dyspnea  - IV Lasix

## 2022-09-19 NOTE — CARE PLAN
The patient is Watcher - Medium risk of patient condition declining or worsening         Progress made toward(s) clinical / shift goals:        Patient is not progressing towards the following goals:      Problem: Knowledge Deficit - Standard  Goal: Patient and family/care givers will demonstrate understanding of plan of care, disease process/condition, diagnostic tests and medications  Outcome: Not Progressing     Problem: Care Map:  Admission Optimal Outcome for the Heart Failure Patient  Goal: Admission:  Optimal Care of the heart failure patient  Outcome: Not Progressing     Problem: Care Map:  Day 1 Optimal Outcome for the Heart Failure Patient  Goal: Day 1:  Optimal Care of the heart failure patient  Outcome: Not Progressing     Problem: Care Map:  Day 2 Optimal Outcome for the Heart Failure Patient  Goal: Day 2:  Optimal Care of the heart failure patient  Outcome: Not Progressing     Problem: Care Map:  Day 3 Optimal Outcome for the Heart Failure Patient  Goal: Day 3:  Optimal Care of the heart failure patient  Outcome: Not Progressing     Problem: Care Map:  Day Before Discharge Optimal Outcome for the Heart Failure Patient  Goal: Day Before Discharge:  Optimal Care of the heart failure patient  9/19/2022 0452 by Derian Monsalve, R.N.  Outcome: Not Progressing  9/19/2022 0213 by Derian Monsalve, R.N.  Outcome: Not Met     Problem: Care Map:  Day of Discharge Optimal Outcome for the Heart Failure Patient  Goal: Day of Discharge:  Optimal Care of the heart failure patient  Outcome: Not Progressing

## 2022-09-19 NOTE — ASSESSMENT & PLAN NOTE
- Recently noted on renal ultrasound  - Check pelvic u/s, pt relates having vaginal bleeding overnight per nursing

## 2022-09-19 NOTE — ASSESSMENT & PLAN NOTE
- Patient has ongoing lower extremity swelling, no CHF noted on recent echocardiogram  -Patient states she has had ongoing edema, has not been taking Lasix  - Superficial thrombophlebitis on doppler, no DVT, however, limited exam due to edema  - Check VQ  - Likely secondary to renal dysfunction and hypoproteinemia (albumin 1.9)  - Renal consulted

## 2022-09-19 NOTE — ASSESSMENT & PLAN NOTE
- Significant shortness of breath per patient and family  -Chest x-ray is clear except for very small bilateral pleural effusions that I do not think are causing any dyspnea  - Dopplers negative, VQ negative  -Pulse ox monitoring  - Appears overloaded on exam, continue diuresis

## 2022-09-19 NOTE — ED NOTES
Blood noted labia when pure wick placed. In patient provider notified.  took pt belongings home. Pt taken to US.

## 2022-09-19 NOTE — ASSESSMENT & PLAN NOTE
- Does appear near her baseline  -Repeat BMP in the morning  -Closely monitor and patient will be on IV Lasix  -Has outpatient nephrology appointment on Tuesday  - Significant proteinuria on UA - will ask Nephro to evaluate pt here   - Pt has not been evaluated by Nephrology in the past  - Renal u/s without acute abnormality at the end of August

## 2022-09-19 NOTE — PROGRESS NOTES
Odalis from Lab called with critical result of Na 117 at 1340. Critical lab result read back to Odalis.   Dr. Kiran notified of critical lab result at 1355.  Critical lab result read back by Dr. Kiran.

## 2022-09-19 NOTE — PROGRESS NOTES
12-hour chart check complete.    Monitor Summary  Rhythm: NSR  Rate: 70s  Ectopy: artifact  Measurements: .16/.10/.36

## 2022-09-19 NOTE — ED PROVIDER NOTES
ED Provider Note    Primary care provider: José Tian D.O.  Means of arrival: EMS  History obtained from: patient  History limited by: none    CHIEF COMPLAINT  Chief Complaint   Patient presents with    Shortness of Breath    Weakness     Pt states she had a sudden onset of shortness of breath and weakness that started about an hour prior to arrival. Pt is poor historian.        HPI  Aimee Mauro is a 76 y.o. female who presents to the Emergency Department for evaluation of shortness of breath and weakness.  Patient reports that she was recently hospitalized for fluid overload on 8/31/2022.  She was subsequently discharged when she was feeling improved.  Today just prior to arrival she developed sudden onset worsening shortness of breath.  She reports associated bilateral lower extremity edema, this has been ongoing since her last hospitalization but has worsened over the last few days.  Patient denies fevers, chills, cough, chest pain.    Per chart review patient was hospitalized at the end of August for leg swelling and shortness of breath.  She had fluid overload at that time and responded well to diuresis.  She was not discharged on diuretics.  Echocardiogram done on 9/1/2022 demonstrated left ventricular ejection fraction of 65%, there was left atrium dilatation.  Seen by her primary care physician on 9/16/2022.  Reportedly she had an episode of atrial fibrillation when she was on telemetry and hospitalized at the end of August and therefore she was started on Eliquis.  She took her first dose today.    REVIEW OF SYSTEMS  Review of Systems   Constitutional:  Negative for chills and fever.   Respiratory:  Positive for shortness of breath. Negative for cough.    Cardiovascular:  Negative for chest pain and leg swelling.   Gastrointestinal:  Negative for nausea and vomiting.   All other systems reviewed and are negative.      PAST MEDICAL HISTORY   has a past medical history of CKD (chronic kidney  "disease), stage IV (HCC), Congestive heart failure (HCC), Insomnia disorder, and Stroke (HCC) ([birth year]01/1978).    SURGICAL HISTORY   has a past surgical history that includes tubal coagulation laparoscopic bilateral.    SOCIAL HISTORY  Social History     Tobacco Use    Smoking status: Never    Smokeless tobacco: Never   Vaping Use    Vaping Use: Never used   Substance Use Topics    Alcohol use: Yes     Comment: 1-2 glasses of wine per week.    Drug use: No      Social History     Substance and Sexual Activity   Drug Use No       FAMILY HISTORY  Family History   Problem Relation Age of Onset    Cancer Mother        CURRENT MEDICATIONS  Home Medications       Reviewed by Estefany Goldberg R.N. (Registered Nurse) on 09/18/22 at 2054  Med List Status: Complete     Medication Last Dose Status   apixaban (ELIQUIS) 5mg Tab 9/18/2022 Active   carvedilol (COREG) 3.125 MG Tab 9/18/2022 Active   multivitamin (THERAGRAN) Tab 9/18/2022 Active   sodium bicarbonate 325 MG Tab 9/18/2022 Active   traZODone (DESYREL) 100 MG Tab 9/17/2022 Active                    ALLERGIES  Not on File    PHYSICAL EXAM  VITAL SIGNS: BP (!) 146/87   Pulse 82   Temp 36.6 °C (97.8 °F) (Temporal)   Resp (!) 21   Ht 1.676 m (5' 6\")   Wt 82.4 kg (181 lb 10.5 oz)   SpO2 96%   BMI 29.32 kg/m²   Vitals reviewed by myself.  Physical Exam  Nursing note and vitals reviewed.  Constitutional: Well-developed and well-nourished. No acute distress.   HENT: Head is normocephalic and atraumatic.  Eyes: extra-ocular movements intact  Cardiovascular: regular rate and  regular rhythm. No murmur heard.  Pulmonary/Chest: Normal respiratory effort, diminished breath sounds in bibasilar bases  Abdominal: Soft and non-tender. No distention.    Musculoskeletal: Extremities exhibit normal range of motion, pitting edema in bilateral lower extremities  Neurological: Awake and alert  Skin: Skin is warm and dry. No rash.         DIAGNOSTIC STUDIES /  LABS  Labs Reviewed   CBC WITH " DIFFERENTIAL - Abnormal; Notable for the following components:       Result Value    RBC 4.04 (*)     Hemoglobin 11.6 (*)     Hematocrit 32.4 (*)     MCV 80.2 (*)     MCHC 35.8 (*)     Platelet Count 81 (*)     All other components within normal limits    Narrative:     Biotin intake of greater than 5 mg per day may interfere with  troponin levels, causing false low values.   COMP METABOLIC PANEL - Abnormal; Notable for the following components:    Sodium 117 (*)     Chloride 87 (*)     Co2 18 (*)     Bun 48 (*)     Creatinine 2.59 (*)     Calcium 7.6 (*)     Albumin 1.9 (*)     Total Protein 5.4 (*)     All other components within normal limits    Narrative:     Biotin intake of greater than 5 mg per day may interfere with  troponin levels, causing false low values.   TROPONIN - Abnormal; Notable for the following components:    Troponin T 30 (*)     All other components within normal limits    Narrative:     Biotin intake of greater than 5 mg per day may interfere with  troponin levels, causing false low values.   PROBRAIN NATRIURETIC PEPTIDE, NT - Abnormal; Notable for the following components:    NT-proBNP 2404 (*)     All other components within normal limits    Narrative:     Biotin intake of greater than 5 mg per day may interfere with  troponin levels, causing false low values.   D-DIMER - Abnormal; Notable for the following components:    D-Dimer Screen 11.52 (*)     All other components within normal limits    Narrative:     Biotin intake of greater than 5 mg per day may interfere with  troponin levels, causing false low values.   URINALYSIS,CULTURE IF INDICATED - Abnormal; Notable for the following components:    Character Cloudy (*)     Protein >=300 (*)     Leukocyte Esterase Large (*)     Occult Blood Large (*)     All other components within normal limits    Narrative:     Indication for culture:->Patient WITHOUT an indwelling [place]  catheter in place with new onset of Dysuria, Frequency,  Urgency,  and/or Suprapubic pain   URINE MICROSCOPIC (W/UA) - Abnormal; Notable for the following components:    -150 (*)     RBC  (*)     Bacteria Many (*)     All other components within normal limits    Narrative:     Indication for culture:->Patient WITHOUT an indwelling [place]  catheter in place with new onset of Dysuria, Frequency,  Urgency, and/or Suprapubic pain   ESTIMATED GFR - Abnormal; Notable for the following components:    GFR (CKD-EPI) 18 (*)     All other components within normal limits    Narrative:     Biotin intake of greater than 5 mg per day may interfere with  troponin levels, causing false low values.   PROCALCITONIN    Narrative:     Biotin intake of greater than 5 mg per day may interfere with  troponin levels, causing false low values.   URINE CULTURE(NEW)    Narrative:     Indication for culture:->Patient WITHOUT an indwelling [place]  catheter in place with new onset of Dysuria, Frequency,  Urgency, and/or Suprapubic pain   BLOOD CULTURE   BLOOD CULTURE   LACTIC ACID       All labs reviewed by me.    EKG Interpretation:  Interpreted by myself    12 Lead EKG interpreted by me to show:  EKG at 2057: Normal sinus rhythm, frequent PACs, heart rate 83, normal axis, normal intervals, , QRS 98, QTc 418, no acute ST-T segment changes, no evidence of acute arrhythmia or ischemia  My impression of this EKG: Does not indicate ischemia or arrhythmia at this time.    RADIOLOGY  DX-CHEST-PORTABLE (1 VIEW)   Final Result      1.  Bibasilar atelectasis with small bilateral pleural effusions.      2.  Cardiomegaly.      US-EXTREMITY VENOUS LOWER BILAT    (Results Pending)     The radiologist's interpretation of all radiological studies have been reviewed by me.      COURSE & MEDICAL DECISION MAKING  Nursing notes, VS, PMSFHx reviewed in chart.    Patient is a 76-year-old female who comes in for evaluation of shortness of breath and leg swelling.  Differential diagnosis includes fluid overload.  She  had an echocardiogram which demonstrated no signs of cardiac etiology of her fluid overload.  However she did have some right atrial enlargement, unclear why she would have right heart strain on the echocardiogram I am concerned about possible pulmonary embolism and therefore will order a D-dimer.  She is currently on adequate treatment with Eliquis.  Further differential diagnosis includes renal dysfunction, renal failure, electrolyte derangement.  Diagnostic work-up includes labs, chest x-ray and EKG.    Patient's initial vitals are within normal limits, she is not hypoxic but feeling short of breath.  She has edema in her bilateral lower extremities.  EKG returns and demonstrates sinus rhythm with frequent PACs.  Labs returned and troponin and BNP are elevated, again likely secondary to fluid overload.  BNP elevated more than her prior admission and therefore she is treated with Lasix.  D-dimer is elevated, unfortunately given her renal function I cannot obtain a CTA.  She is currently on treatment for pulmonary embolism.  Will obtain bilateral DVT studies.  Chest x-ray demonstrates small bilateral pleural effusions.  Urinalysis was obtained for further assessment of her renal function and she was incidentally noted to have a urinary tract infection for which she is treated with ceftriaxone.  Patient's GFR today is 18, this is relatively unchanged from prior admission.  She is hyponatremic, this is of unclear etiology.  Patient will be hospitalized for ongoing management of fluid overload and shortness of breath.  She will require gentle diuresis in the setting of renal dysfunction.  She will also need further management of her hyponatremia and ongoing work-up for possible DVT/PE.  DVT studies are pending.  I discussed the case with Dr. Lovell who is excepted patient for hospitalization.  Patient is in guarded condition.      FINAL IMPRESSION  1. SOB (shortness of breath)    2. Hypervolemia, unspecified  hypervolemia type    3. Hyponatremia

## 2022-09-19 NOTE — ASSESSMENT & PLAN NOTE
- Rocephin  - With hematuria   - No hydronephrosis last hospitalization to suggest stone, no flank pain

## 2022-09-19 NOTE — PROGRESS NOTES
Hospital Medicine Daily Progress Note    Date of Service  9/19/2022    Chief Complaint  Aimee Mauro is a 76 y.o. female admitted 9/18/2022 with SOB    Hospital Course  Pt is a female without a significant medical history who was recently hospitalized here last month with SOB and SAY, as well as volume overload. She also had frequent atrial ectopy as well. She presented back to hospital with recurrent SOB. She had significant hyponatremia on admit to 117.      Interval Problem Update  9/19 - Pt afebrile, largely normotensive overnight, requiring 2L O2. Labs are consistent with prior hospitalization with mild anemia and somewhat significant thrombocytopenia. Admit Na was 117 and went up to 120, then back to 117. Was not placed on volume restriction on admit, will start. Started on Lasix on admit, urine sodium not obtained, will no longer be accurate.  Will check TSH and am cortisol.     Uterine mass was seen on renal ultrasound last stay - will check pelvic u/s. Pt denies any significant medical history whatsoever, but per nursing, had some vaginal bleeding overnight. Will add liver ultrasound as well to evaluate for hepatosplenomegaly given her thrombocytopenia. Discussed endometrial mass, thrombocytopenia, possible need for BMB, renal consultation and VQ with patient and , answered all questions.  Discussed with Dr Najjar who will see the patient.     I have discussed this patient's plan of care and discharge plan at IDT rounds today with Case Management, Nursing, Nursing leadership, and other members of the IDT team.    Consultants/Specialty  nephrology    Code Status  Full Code    Disposition  Patient is not medically cleared for discharge.   Anticipate discharge to to home with close outpatient follow-up.  I have placed the appropriate orders for post-discharge needs.    Review of Systems  Review of Systems   Constitutional:  Negative for chills and fever.   Respiratory:  Positive for shortness of  breath. Negative for cough.    Cardiovascular:  Positive for leg swelling. Negative for chest pain.   Gastrointestinal:  Negative for nausea and vomiting.   Genitourinary:  Negative for dysuria.        Vaginal bleeding overnight     Musculoskeletal:  Negative for myalgias.   Neurological:  Positive for weakness. Negative for focal weakness.   All other systems reviewed and are negative.     Physical Exam  Temp:  [36.3 °C (97.3 °F)-36.6 °C (97.8 °F)] 36.3 °C (97.3 °F)  Pulse:  [72-83] 81  Resp:  [16-36] 18  BP: (124-146)/(68-87) 124/73  SpO2:  [94 %-98 %] 94 %    Physical Exam  Vitals and nursing note reviewed.   Constitutional:       Appearance: Normal appearance. She is not ill-appearing.   HENT:      Head: Normocephalic and atraumatic.      Mouth/Throat:      Mouth: Mucous membranes are dry.   Cardiovascular:      Rate and Rhythm: Normal rate and regular rhythm.      Heart sounds: No murmur heard.  Pulmonary:      Effort: Pulmonary effort is normal. No respiratory distress.      Breath sounds: Normal breath sounds. No wheezing or rales.   Abdominal:      General: Abdomen is flat. Bowel sounds are normal.      Palpations: Abdomen is soft.   Musculoskeletal:         General: Swelling present.      Right lower leg: Edema present.      Left lower leg: Edema present.   Skin:     General: Skin is warm and dry.      Coloration: Skin is pale. Skin is not jaundiced.   Neurological:      General: No focal deficit present.      Mental Status: She is alert and oriented to person, place, and time.   Psychiatric:         Mood and Affect: Mood normal.         Behavior: Behavior normal.       Fluids  No intake or output data in the 24 hours ending 09/19/22 0738    Laboratory  Recent Labs     09/18/22 2045 09/19/22  0630   WBC 7.5 7.5   RBC 4.04* 3.86*   HEMOGLOBIN 11.6* 11.1*   HEMATOCRIT 32.4* 31.3*   MCV 80.2* 81.1*   MCH 28.7 28.8   MCHC 35.8* 35.5*   RDW 38.7 39.2   PLATELETCT 81* 68*   MPV 10.0 10.6     Recent Labs      09/18/22 2045   SODIUM 117*   POTASSIUM 4.6   CHLORIDE 87*   CO2 18*   GLUCOSE 96   BUN 48*   CREATININE 2.59*   CALCIUM 7.6*     Recent Labs     09/18/22 2045   APTT 27.5   INR 1.17*               Imaging  NM-LUNG VENT/PERF IMAGING   Final Result      Low probability for pulmonary embolism.      US-EXTREMITY VENOUS LOWER BILAT   Final Result      1.  Very limited study due to extensive soft tissue edema.      2.  Difficulty compressing the superficial femoral veins bilaterally due to soft tissue edema. There is however color flow seen within the vessels without any luminal thrombus. No thrombus is seen within the deep venous system.      3.  Nonocclusive thrombus within the left greater saphenous vein proximally and within the mid thigh.      4.  Very limited evaluation of the lower leg vessels due to severe edema.                  DX-CHEST-PORTABLE (1 VIEW)   Final Result      1.  Bibasilar atelectasis with small bilateral pleural effusions.      2.  Cardiomegaly.      US-ABDOMEN COMPLETE SURVEY    (Results Pending)   US-PELVIC TRANSABDOMINAL ONLY    (Results Pending)        Assessment/Plan  * Hyponatremia- (present on admission)  Assessment & Plan  - Significant hyponatremia which has worsened from recent discharge  - She is not on medications to cause hyponatremia  - Improving with Lasix, likely hypervolemic hyponatremia clinically  - Unfortunately, was given Lasix with admission, so urine sodium no longer reliable  -Obtain BMP every 6 hours, avoid increasing sodium level more than 8/day  - [place] volume restriction and 2gm sodium diet   - Check TSH and cortisol level   - Echo largely normal, though diastolic dysfunction could not be assessed - check INR, abd u/s to eval for cirrhosis/splenomegaly contributing.  Can also certainly have contribution from her renal dysfunction.   - Has significant hypoalbuminemia as well that may be contributing    Thrombocytopenia (HCC)  Assessment & Plan  - Currently  unclear etiology - check ADAMS13, retic count, LDH, hapto, fibrinogen and abdominal ultrasound  - No s/s of bleeding  - May need BMB if testing unrevealing      UTI (urinary tract infection)  Assessment & Plan  - Rocephin  - With hematuria   - No hydronephrosis last hospitalization to suggest stone, no flank pain    Vitamin D deficiency  Assessment & Plan  - Start supplementation     Pleural effusion- (present on admission)  Assessment & Plan  - Noted on chest x-ray, bilateral, small left pleural effusion was present on recent chest x-ray  -I do not feel these are large enough to be causing significant dyspnea  - IV Lasix    Hypertension- (present on admission)  Assessment & Plan  - Continue home Coreg  -Start as needed labetalol  -Adjust as needed    Paroxysmal atrial fibrillation (HCC)- (present on admission)  Assessment & Plan  -  Patient was noted to have question of afib on telemetry last hospital stay, but not confirmed with EKG.  Has been sinus rhythm since admission here - will hold Eliquis for now given lack of confirmation of afib and concurrent thrombocytopenia, and continue to monitor on tele   -Monitor on telemetry      Hypocalcemia- (present on admission)  Assessment & Plan  - Start supplementation, vit D deficient as well    Uterine mass- (present on admission)  Assessment & Plan  - Recently noted on renal ultrasound  - Check pelvic u/s, pt relates having vaginal bleeding overnight per nursing      Stage 4 chronic kidney disease (HCC)- (present on admission)  Assessment & Plan  - Does appear near her baseline  -Repeat BMP in the morning  -Closely monitor and patient will be on IV Lasix  -Has outpatient nephrology appointment on Tuesday  - Significant proteinuria on UA - will ask Nephro to evaluate pt here   - Pt has not been evaluated by Nephrology in the past  - Renal u/s without acute abnormality at the end of August    Bilateral leg swelling- (present on admission)  Assessment & Plan  - Patient has  ongoing lower extremity swelling, no CHF noted on recent echocardiogram  -Patient states she has had ongoing edema, has not been taking Lasix  - Superficial thrombophlebitis on doppler, no DVT, however, limited exam due to edema  - Check VQ  - Likely secondary to renal dysfunction and hypoproteinemia (albumin 1.9)  - Renal consulted    Shortness of breath- (present on admission)  Assessment & Plan  - Significant shortness of breath per patient and family  -Chest x-ray is clear except for very small bilateral pleural effusions that I do not think are causing any dyspnea  - Dopplers negative, VQ negative  -Pulse ox monitoring  - Appears overloaded on exam, continue diuresis     Elevated troponin- (present on admission)  Assessment & Plan  - Mild elevation, continue to trend and monitor patient on telemetry (stable)   - No WMA on recent echo  - Check VQ  - May be secondary to renal dysfunction  - Stable       VTE prophylaxis: SCDs/TEDs    I have performed a physical exam and reviewed and updated ROS and Plan today (9/19/2022). In review of yesterday's note (9/18/2022), there are no changes except as documented above.

## 2022-09-19 NOTE — DISCHARGE PLANNING
Care Transition Team Assessment  RNCM completed assessment with information obtained from chart review. No ACP documents on file. Patient has spouse, Stephen ([phone]) listed as emergency contact. Patient presented to the ER with SOB and leg swelling. She was recently hospitalized at the end of august for fluid overload. Discharge disposition is unclear at this time. RNCM will continue to follow for any discharge planning needs.     Information Source  Orientation Level: Oriented X4  Information Given By:  (EMR)  Who is responsible for making decisions for patient? : Patient    Readmission Evaluation  Is this a readmission?: No    Elopement Risk  Legal Hold: No  Ambulatory or Self Mobile in Wheelchair: No-Not an Elopement Risk  Elopement Risk: Not at Risk for Elopement    Interdisciplinary Discharge Planning  Lives with - Patient's Self Care Capacity: Spouse  Patient or legal guardian wants to designate a caregiver: No  Support Systems: Spouse / Significant Other  Housing / Facility: [address] [facility]  Name of Care Facility: NA  Durable Medical Equipment: Not Applicable  DME Provider / Phone: NA    Discharge Preparedness  What is your plan after discharge?: Uncertain - pending medical team collaboration  What are your discharge supports?: Spouse  Prior Functional Level: Ambulatory, Independent with Activities of Daily Living    Functional Assesment  Prior Functional Level: Ambulatory, Independent with Activities of Daily Living    Finances  Financial Barriers to Discharge: No    Vision / Hearing Impairment  Vision Impairment : No  Hearing Impairment : No         Advance Directive  Advance Directive?:  (no ACP documents on file)    Domestic Abuse  Have you ever been the victim of abuse or violence?: No  Physical Abuse or Sexual Abuse: No  Verbal Abuse or Emotional Abuse: No  Possible Abuse/Neglect Reported to:: Not Applicable         Discharge Risks or Barriers  Discharge risks or barriers?: No  Patient risk factors:  Vulnerable adult    Anticipated Discharge Information  Discharge Disposition: Still a Patient (30)

## 2022-09-20 NOTE — PROGRESS NOTES
Pt discharged to home. Discharge instructions provided to pt. Pt verbalizes understanding. Pt states all questions have been answered. Signed copy in chart. Prescriptions sent to pharmacy. Pt states that all personal belongings are in possession. Pt off unit via wheelchair, escorted by nursing staff.    Pt left AMA.

## 2022-09-20 NOTE — CONSULTS
DATE OF SERVICE:  09/19/2022     REQUESTING PHYSICIAN:  Crystal Kiran MD     REASON FOR CONSULTATION:  Acute kidney injury and hyponatremia.     The patient seen and examined, medical record reviewed.     HISTORY OF PRESENT ILLNESS:  The patient is a 76-year-old lady with a past   medical history significant for chronic kidney disease stage IV,   cardiomyopathy, presented to the hospital late last evening with shortness of   breath and weakness.  The patient found to have elevated creatinine at 2.59.    She also was found to be hyponatremic.  We were consulted to manage her kidney   disease and hyponatremia.     The patient has no recent use of NSAIDs or IV contrast exposure.     PAST MEDICAL HISTORY:  Significant for:  1.  Cardiomyopathy.  2.  Chronic kidney disease stage IV.     ALLERGIES:  No known drug allergies.     SOCIAL HISTORY:  The patient has no smoking history.     FAMILY HISTORY:  Positive for cancer in her mother.     MEDICATIONS:  Reviewed.     REVIEW OF SYSTEMS:  All systems were reviewed; they were negative except   outlined in the history of present illness.     PHYSICAL EXAMINATION:  GENERAL:  The patient appears ill, but no apparent distress.  VITAL SIGNS:  Showed blood pressure of 123/72, heart rate was 78, respiratory   rate was 18.  HEENT:  Normocephalic, atraumatic.  Sclerae are anicteric.  Pupils are   reactive.  Nose normal.  Mucous membranes moist.  NECK:  No lymphadenopathy, no JVD, no thyroid mass.  CHEST:  Normal.  LUNGS:  Clear to auscultation.  HEART:  S1, S2.  ABDOMEN:  Soft, nontender.  No hepatosplenomegaly.  There is no inguinal   lymphadenopathy.  EXTREMITIES:  There is trace lower extremity edema.  SKIN:  No skin rash.  NEUROLOGIC:  The patient is alert, oriented x3.  MOOD:  The patient is normal.     LABORATORY DATA:  Her recent labs from today were reviewed.     DIAGNOSTIC DATA:  The patient had a renal ultrasound done on 08/31/2022.  I   reviewed the image myself, which  showed no hydronephrosis; however, it did   show endometrial mass.     ASSESSMENT:  1.  Chronic kidney disease stage IV, possibly secondary to hypertensive   nephrosclerosis.  2.  Hyponatremia.  The etiology is not very clear, we need to rule out malignancy as renal ultrasound showed endometrial mass.  3.  Metabolic acidosis.  4.  Respiratory failure.  5.  Volume overload.  6.  Anemia.  7.  Hypoalbuminemia.     PLAN:  1.  There is no acute need for renal replacement therapy.  2.  Free water restriction.  3.  Serial sodium level check.  4.  Check serum and urine osmolarity.  5.  Check microalbumin to creatinine ratio to evaluate proteinuria.  6.  If there is no improvement in the kidney function or we found significant   proteinuria, we will consider a kidney biopsy   7.  Renal diet.  8.  Renal dose all medications.  9.  Prognosis is guarded.     Above plan discussed with Dr. Kiran.        ______________________________  FADI NAJJAR, MD FN/FRITZ    DD:  09/19/2022 16:17  DT:  09/19/2022 19:35    Job#:  [id number]

## 2022-09-20 NOTE — DISCHARGE INSTRUCTIONS
Discharge Instructions    Hyponatremia  Hyponatremia is when the amount of salt (sodium) in your blood is too low. When salt levels are low, your body may take in extra water. This can cause swelling throughout the body. The swelling often affects the brain.  What are the causes?  This condition may be caused by:  Certain medical problems or conditions.  Vomiting a lot.  Having watery poop (diarrhea) often.  Certain medicines or illegal drugs.  Not having enough water in the body (dehydration).  Drinking too much water.  Eating a diet that is low in salt.  Large burns on your body.  Too much sweating.  What increases the risk?  You are more likely to get this condition if you:  Have long-term (chronic) kidney disease.  Have heart failure.  Have a medical condition that causes you to have watery poop often.  Do very hard exercises.  Take medicines that affect the amount of salt is in your blood.  What are the signs or symptoms?  Symptoms of this condition include:  Headache.  Feeling like you may vomit (nausea).  Vomiting.  Being very tired (lethargic).  Muscle weakness and cramps.  Not wanting to eat as much as normal (loss of appetite).  Feeling weak or light-headed.  Severe symptoms of this condition include:  Confusion.  Feeling restless (agitation).  Having a fast heart rate.  Passing out (fainting).  Seizures.  Coma.  How is this treated?  Treatment for this condition depends on the cause. Treatment may include:  Getting fluids through an IV tube that is put into one of your veins.  Taking medicines to fix the salt levels in your blood. If medicines are causing the problem, your medicines will need to be changed.  Limiting how much water or fluid you take in.  Monitoring in the hospital to watch your symptoms.  Follow these instructions at home:    Take over-the-counter and prescription medicines only as told by your doctor. Many medicines can make this condition worse. Talk with your doctor about any medicines  that you are taking.  Eat and drink exactly as you are told by your doctor.  Eat only the foods you are told to eat.  Limit how much fluid you take.  Do not drink alcohol.  Keep all follow-up visits as told by your doctor. This is important.  Contact a doctor if:  You feel more like you may vomit.  You feel more tired.  Your headache gets worse.  You feel more confused.  You feel weaker.  Your symptoms go away and then they come back.  You have trouble following the diet instructions.  Get help right away if:  You have a seizure.  You pass out.  You keep having watery poop.  You keep vomiting.  Summary  Hyponatremia is when the amount of salt in your blood is too low.  When salt levels are low, you can have swelling throughout the body. The swelling mostly affects the brain.  Treatment depends on the cause. Treatment may include getting IV fluids, medicines, or not drinking as much fluid.  This information is not intended to replace advice given to you by your health care provider. Make sure you discuss any questions you have with your health care provider.  Document Released: 08/29/2012 Document Revised: 03/05/2020 Document Reviewed: 11/21/2019  [employer] Patient Education © 2020 [employer]      Discharged to home by car with relative. Discharged via wheelchair, hospital escort: Yes.  Special equipment needed: Not Applicable    Be sure to schedule a follow-up appointment with your primary care doctor or any specialists as instructed.     Discharge Plan:   Diet Plan: Discussed  Activity Level: Discussed  Confirmed Follow up Appointment: Patient to Call and Schedule Appointment  Confirmed Symptoms Management: Discussed  Medication Reconciliation Updated: Yes  Influenza Vaccine Indication: Indicated: Not available from distributor/    I understand that a diet low in cholesterol, fat, and sodium is recommended for good health. Unless I have been given specific instructions below for another diet, I accept  this instruction as my diet prescription.   Other diet: regular    Special Instructions: None    -Is this patient being discharged with medication to prevent blood clots?  No    Is patient discharged on Warfarin / Coumadin?   No

## 2022-09-20 NOTE — TELEPHONE ENCOUNTER
Spoke to patient in regards to obtaining records for NP appointment with Dr. Buckley. Per patient has never been treated by a previous cardiologist. Confirmed all recent notes, labs, and cardiac imaging are in Epic. Confirmed with patient appointment time, location, and date.

## 2022-09-20 NOTE — PROGRESS NOTES
Lab called with critical result of 118 at 0230. Critical lab result read back to Dr. Lovell at 0237, no new orders at this time.

## 2022-09-20 NOTE — PROGRESS NOTES
Lab called with critical result of Sodium 117 at 1920. Critical lab result read back to Dr. Hopper at 1930. New order for sodium tab tonight.

## 2022-09-20 NOTE — CARE PLAN
The patient is Watcher - Medium risk of patient condition declining or worsening    Shift Goals  Clinical Goals: avoid drops in Na  Patient Goals: complete CT and US by lunch  Family Goals: BRIJESH    Progress made toward(s) clinical / shift goals:  US of abd complete    Patient is not progressing towards the following goals:Pt sodium dropped to 116      Problem: Hemodynamics  Goal: Patient's hemodynamics, fluid balance and neurologic status will be stable or improve  Outcome: Not Progressing   Pt still having low Na despite fluid restriction, MD aware

## 2022-09-20 NOTE — PROGRESS NOTES
Rhythm: SR, ST  Rate:   Measurements: 0.16/0.08/0.36  Ectopy (reported by Monitor Tech): f PACs     Normal Values  Rhythm: Sinus  HR:   Measurements: 0.12-0.20/0.06-0.10/0.30-0.52

## 2022-09-20 NOTE — DISCHARGE SUMMARY
AGAINST MEDICAL ADVICE discharge Summary    CHIEF COMPLAINT ON ADMISSION  Chief Complaint   Patient presents with    Shortness of Breath    Weakness     Pt states she had a sudden onset of shortness of breath and weakness that started about an hour prior to arrival. Pt is poor historian.        Reason for Admission  EMS     Admission Date  9/18/2022    CODE STATUS  Full Code    HPI & HOSPITAL COURSE  Ms. Aimee Diaz is a 76-year-old female comes into the hospital complaining of generalized weakness and worsening shortness of breath.  The patient was noticed to have slight confusion, worsening lower extremity edema, continued chronic renal failure with elevated creatinine and poor glomerular filtration rate.  Patient is also noted to have hyponatremia which has progressively gotten worse.  Nephrology was consulted.  Nephrology at this point has seen the patient and recommends no dialysis.  They do recommend continued medication optimization to avoid medications that would can further compromise her renal functions.  The patient at this point has chronic kidney disease stage IV.  She also has a history of congestive heart failure.  The heart failure at this point seems to be stable.  The patient today had a ultrasound of her abdomen done.  This was to be followed by a CT scan of her abdomen pelvis.  The patient seems to have a newly discovered endometrial mass measuring 6.1 x 4.4 x 4.2 cm.  This needs to be further evaluated as it could represent a polyp or neoplasm or a fibroid tissue.  The patient has been referred to gynecology.  Of concern was that the patient's sodium still remains at 116.  As we were discussing all this the patient decided that she no longer wants to stay in the hospital and she wants to complete the rest of the work-up as an outpatient.  The  was at bedside and even told me sorry once she makes up her mind I cannot convince her otherwise.  She is not going to stay in the hospital.   I spoke with her primary care physician in detail as well as the nephrologist.  They all agreed that we will continue helping her but at this point since the patient is signing out AGAINST MEDICAL ADVICE she will need to continue treatment as an outpatient.    Therefore, she is discharged in good and stable condition to home with close outpatient follow-up.    The patient met 2-midnight criteria for an inpatient stay at the time of discharge.    Discharge Date  9/20/2022 AGAINST MEDICAL ADVICE    FOLLOW UP ITEMS POST DISCHARGE  Follow-up with the primary care physician as soon as possible    DISCHARGE DIAGNOSES  Principal Problem:    Hyponatremia POA: Yes  Active Problems:    Elevated troponin POA: Yes    Shortness of breath POA: Yes    Bilateral leg swelling POA: Yes    Stage 4 chronic kidney disease (HCC) POA: Yes      Overview: Labs from 9/7/22      -Creatine 2.8      -GFR 18            Patient had an echo ruling out heart failure. Retroperionteal US did not       mention hydronephrosis.     Uterine mass POA: Yes      Overview: Patient had any incidental finding for uterine mass measuring 4.8 x 3.7 x       4.3 cm found on renal US. No un-intentional weight loss, no night sweats       or fevers     Hypocalcemia POA: Yes    Paroxysmal atrial fibrillation (HCC) POA: Yes      Overview: Patient had an episode of afib on telemetry. She had an echo that did not       report MR. She is on coreg. She not on blood thinners currently. She has       an appointment with cardiology on 9/20.    Hypertension POA: Yes    Pleural effusion POA: Yes    Vitamin D deficiency POA: Unknown    UTI (urinary tract infection) POA: Unknown    Thrombocytopenia (HCC) POA: Unknown  Resolved Problems:    * No resolved hospital problems. *      FOLLOW UP  Future Appointments   Date Time Provider Department Center   9/20/2022  5:00 PM [facility] CT 1 [facility]   9/27/2022  8:15 AM Howie Buckley M.D. [facility] None   10/18/2022 10:40 AM José [facility]  ABEL Tian [facility] [place]   11/15/2022  2:30 PM Anton Hernandez M.D. [facility] [address]     No follow-up provider specified.    MEDICATIONS ON DISCHARGE     Medication List        START taking these medications        Instructions   sodium chloride 1 GM Tabs  Commonly known as: SALT   Take 1 Tablet by mouth 3 times a day.  Dose: 1 g     sulfamethoxazole-trimethoprim 800-160 MG tablet  Commonly known as: BACTRIM DS   Take 0.5 Tablets by mouth 2 times a day for 5 days.  Dose: 0.5 Tablet     vitamin D 1000 UNIT Tabs  Start taking on: September 21, 2022  Commonly known as: VITAMIND D3   Take 1 Tablet by mouth every day.  Dose: 1,000 Units            CONTINUE taking these medications        Instructions   apixaban 5mg Tabs  Commonly known as: Eliquis   Take 1 Tablet by mouth 2 times a day.  Dose: 5 mg     carvedilol 3.125 MG Tabs  Commonly known as: COREG   Take 1 Tablet by mouth 2 times a day with meals for 30 days.  Dose: 3.125 mg     sodium bicarbonate 325 MG Tabs   Take 1 Tablet by mouth 2 times a day for 90 days.  Dose: 325 mg     traZODone 100 MG Tabs  Commonly known as: DESYREL   Take 1 Tablet by mouth every evening.  Dose: 100 mg            STOP taking these medications      MULTIVITAMIN GUMMIES ADULT PO              Allergies  No Known Allergies    DIET  Orders Placed This Encounter   Procedures    Diet Order Diet: Regular; Fluid modifications: (optional): 1200 ml Fluid Restriction     Standing Status:   Standing     Number of Occurrences:   1     Order Specific Question:   Diet:     Answer:   Regular [1]     Order Specific Question:   Fluid modifications: (optional)     Answer:   1200 ml Fluid Restriction [8]       ACTIVITY  As tolerated.  Weight bearing as tolerated    CONSULTATIONS  Nephrology Dr. Najjar    PROCEDURES  None    LABORATORY  Lab Results   Component Value Date    SODIUM 116 (LL) 09/20/2022    POTASSIUM 4.5 09/20/2022    CHLORIDE 91 (L) 09/20/2022    CO2 14 (L) 09/20/2022    GLUCOSE 86 09/20/2022    BUN  48 (H) 09/20/2022    CREATININE 2.42 (H) 09/20/2022        Lab Results   Component Value Date    WBC 6.5 09/20/2022    HEMOGLOBIN 11.0 (L) 09/20/2022    HEMATOCRIT 31.4 (L) 09/20/2022    PLATELETCT 71 (L) 09/20/2022        Total time of the discharge process exceeds 35 minutes.

## 2022-09-21 NOTE — PROGRESS NOTES
Chief Complaint   Patient presents with    Atrial Fibrillation    Hypertension    Congestive Heart Failure       Subjective     Aimee Mauro is a 76 y.o. female who presents today as a new consultation for heart failure.  She is a 76-year-old female who does not have a lot of medical history and apparently was never taking her medications.  She was admitted to [facility] with shortness of breath and bilateral leg swelling.  Is been getting progressive over a week.  She was noted to have a sodium of 116 at discharge.  She left AMA prior to seeing nephrology.  She had echocardiogram which is normal.    Past Medical History:   Diagnosis Date    CKD (chronic kidney disease), stage IV (HCC)     Congestive heart failure (HCC)     Insomnia disorder     Stroke (HCC) 01/01/1978     Past Surgical History:   Procedure Laterality Date    TUBAL COAGULATION LAPAROSCOPIC BILATERAL       Family History   Problem Relation Age of Onset    Cancer Mother      Social History     Socioeconomic History    Marital status:      Spouse name: Not on file    Number of children: Not on file    Years of education: Not on file    Highest education level: Not on file   Occupational History    Not on file   Tobacco Use    Smoking status: Never    Smokeless tobacco: Never   Vaping Use    Vaping Use: Never used   Substance and Sexual Activity    Alcohol use: Yes     Comment: 1-2 glasses of wine per week.    Drug use: No    Sexual activity: Not on file   Other Topics Concern    Not on file   Social History Narrative    Not on file     Social Determinants of Health     Financial Resource Strain: Low Risk     Difficulty of Paying Living Expenses: Not hard at all   Food Insecurity: No Food Insecurity    Worried About Running Out of Food in the Last Year: Never true    Ran Out of Food in the Last Year: Never true   Transportation Needs: No Transportation Needs    Lack of Transportation (Medical): No    Lack of Transportation  "(Non-Medical): No   Physical Activity: Not on file   Stress: Not on file   Social Connections: Not on file   Intimate Partner Violence: Not on file   Housing Stability: Not on file     No Known Allergies  Outpatient Encounter Medications as of 9/21/2022   Medication Sig Dispense Refill    vitamin D (VITAMIND D3) 1000 UNIT Tab Take 1 Tablet by mouth every day. 60 Tablet 1    apixaban (ELIQUIS) 5mg Tab Take 1 Tablet by mouth 2 times a day. 60 Tablet 0    traZODone (DESYREL) 100 MG Tab Take 1 Tablet by mouth every evening. 90 Tablet 2    sodium bicarbonate 325 MG Tab Take 1 Tablet by mouth 2 times a day for 90 days. 180 Tablet 2    carvedilol (COREG) 3.125 MG Tab Take 1 Tablet by mouth 2 times a day with meals for 30 days. 60 Tablet 0    sulfamethoxazole-trimethoprim (BACTRIM DS) 800-160 MG tablet Take 0.5 Tablets by mouth 2 times a day for 5 days. 3 Tablet 0    [DISCONTINUED] sodium chloride (SALT) 1 GM Tab Take 1 Tablet by mouth 3 times a day. 90 Tablet 1     No facility-administered encounter medications on file as of 9/21/2022.     Review of Systems   Constitutional: Negative.  Negative for chills, fever and malaise/fatigue.   HENT: Negative.  Negative for sore throat.    Eyes: Negative.    Respiratory: Negative.  Negative for cough, hemoptysis, sputum production, shortness of breath, wheezing and stridor.    Cardiovascular: Negative.  Negative for chest pain, palpitations, orthopnea, claudication, leg swelling and PND.   Gastrointestinal: Negative.    Genitourinary: Negative.    Musculoskeletal: Negative.    Skin: Negative.    Neurological:  Positive for weakness. Negative for dizziness and loss of consciousness.   Endo/Heme/Allergies: Negative.  Does not bruise/bleed easily.   All other systems reviewed and are negative.           Objective     BP (!) 140/90 (BP Location: Left arm, Patient Position: Sitting, BP Cuff Size: Adult)   Pulse 86   Resp 16   Ht 1.676 m (5' 6\")   Wt 84.4 kg (186 lb)   SpO2 96%   " BMI 30.02 kg/m²     Physical Exam  Vitals and nursing note reviewed.   Constitutional:       General: She is not in acute distress.     Appearance: She is well-developed. She is not diaphoretic.   HENT:      Head: Normocephalic and atraumatic.      Right Ear: External ear normal.      Left Ear: External ear normal.      Nose: Nose normal.      Mouth/Throat:      Pharynx: No oropharyngeal exudate.   Eyes:      General: No scleral icterus.        Right eye: No discharge.         Left eye: No discharge.      Conjunctiva/sclera: Conjunctivae normal.      Pupils: Pupils are equal, round, and reactive to light.   Neck:      Vascular: No JVD.   Cardiovascular:      Rate and Rhythm: Normal rate and regular rhythm.      Heart sounds: No murmur heard.    No friction rub. No gallop.   Pulmonary:      Effort: Pulmonary effort is normal. No respiratory distress.      Breath sounds: No stridor. No wheezing or rales.   Chest:      Chest wall: No tenderness.   Abdominal:      General: There is no distension.      Palpations: Abdomen is soft.      Tenderness: There is no guarding.   Musculoskeletal:         General: No tenderness or deformity. Normal range of motion.      Cervical back: Neck supple.   Skin:     General: Skin is warm and dry.      Coloration: Skin is not pale.      Findings: No erythema or rash.   Neurological:      Mental Status: She is alert.      Cranial Nerves: No cranial nerve deficit.      Motor: No abnormal muscle tone.      Coordination: Coordination normal.      Deep Tendon Reflexes: Reflexes are normal and symmetric. Reflexes normal.   Psychiatric:         Behavior: Behavior normal.         Thought Content: Thought content normal.         Judgment: Judgment normal.          Echocardiogram: Dated 9/1/2022 personally viewed inter myself showing normal LV systolic function no valvular heart disease.    Ultrasound kidneys: Dated 8/31/2022 personally reviewed inter myself showing normal kidneys.    Lab Results    Component Value Date/Time    CHOLSTRLTOT 358 (H) 09/07/2022 09:33 AM     (H) 09/07/2022 09:33 AM    HDL 47 09/07/2022 09:33 AM    TRIGLYCERIDE 213 (H) 09/07/2022 09:33 AM       Lab Results   Component Value Date/Time    SODIUM 116 (LL) 09/20/2022 07:33 AM    POTASSIUM 4.5 09/20/2022 07:33 AM    CHLORIDE 91 (L) 09/20/2022 07:33 AM    CO2 14 (L) 09/20/2022 07:33 AM    GLUCOSE 86 09/20/2022 07:33 AM    BUN 48 (H) 09/20/2022 07:33 AM    CREATININE 2.42 (H) 09/20/2022 07:33 AM     Lab Results   Component Value Date/Time    ALKPHOSPHAT 76 09/20/2022 01:20 AM    ASTSGOT 23 09/20/2022 01:20 AM    ALTSGPT 11 09/20/2022 01:20 AM    TBILIRUBIN <0.2 09/20/2022 01:20 AM          Assessment & Plan     1. Elevated troponin        2. Shortness of breath        3. Bilateral leg swelling        4. Stage 4 chronic kidney disease (HCC)  Referral to Nephrology      5. Elevated brain natriuretic peptide (BNP) level        6. Hospital discharge follow-up        7. Paroxysmal atrial fibrillation (HCC)        8. Hyperparathyroidism, secondary (HCC)        9. Hypertension, unspecified type        10. Pleural effusion        11. Thrombocytopenia (HCC)            Medical Decision Making: Today's Assessment/Status/Plan:        76-year-old female with hyponatremia as well as renal failure of unknown etiology.  She does not use NSAIDs.  I cannot identify any offending agent and does not have high blood pressure.  Either way with her kidneys and her low sodium she needs an urgent referral to nephrology.  I placed 1 stat.  In the meantime I had her stop her sodium chloride but stay on the sodium bicarbonate.  For her presumed bit of A. fib I will keep her on Eliquis though I do not find any documentation of this.  We will likely stop this later and if it needs to be stopped for other reasons this is safe to do.  We will see her back here in 3 months.

## 2022-09-21 NOTE — PROGRESS NOTES
Telemetry Strip     Strip printed: 1021  Measurements from am strip were as follows:  Rhythm: SR  HR: 75-85  Measurements: 0.16/ 0.08/ 0.36  Ectopy: o PVC, r trig             Normal Values  Rhythm SR  HR Range    Measurements 0.12-0.20 / 0.06-0.10  / 0.30-0.52

## 2022-09-22 NOTE — PROGRESS NOTES
Patient's endometrial mass noted on transabdominal ultrasound.  To further evaluate this mass to differentiate if this is a fibroid or some other kind of mass I had a further discussion with radiology considering patient is not able to tolerate any kind of diet due to her poor kidney function at this time.  After further discussion it seems transvaginal ultrasound is the best modality at this time.  I will order a transvaginal ultrasound.  This was also discussed with patient's family members.  In the past she has refused the transvaginal ultrasound, patient's family will try to persuade her to proceed with the imaging study.  Patient is still pending eval from gynecology.

## 2022-09-23 PROBLEM — R19.7 DIARRHEA: Status: ACTIVE | Noted: 2022-01-01

## 2022-09-23 NOTE — ED PROVIDER NOTES
ED Provider Note    ED Provider Note    Primary care provider: José Tian D.O.  Means of arrival: Walk-in  History obtained from: Patient    CHIEF COMPLAINT  Chief Complaint   Patient presents with    Shortness of Breath     X 1 mon  Admitted for CRF Stage 4 and Left AMA 4 d ago    Peripheral Edema     BLE x 1 mon     Seen at 4:36 PM.   HPI  Aimee Mauro is a 76 y.o. female who presents to the Emergency Department at the insistence of the APRN in the nephrology clinic who had a televisit with her today.  The patient was seen here for CHF and hyponatremia.  She signed out AGAINST MEDICAL ADVICE, she had a follow-up with telehealth this morning and the APRN insisted that she come back here for admission for her hyponatremia.    The patient does have a history of CHF, she was discharged recently on sodium chloride tablets but was instructed by nephrology and cardiology not to take them.  She is not on Lasix currently.  She does have some intermittent PND.  She does feel some mild dyspnea on occasion.  She denies any fevers, chills, chest pain.  She is on Eliquis twice daily and has been compliant with this.    REVIEW OF SYSTEMS  See HPI,   Remainder of ROS negative.     PAST MEDICAL HISTORY   has a past medical history of CKD (chronic kidney disease), stage IV (HCC), Congestive heart failure (HCC), Insomnia disorder, and Stroke (HCC) (01/01/1978).    SURGICAL HISTORY   has a past surgical history that includes tubal coagulation laparoscopic bilateral.    SOCIAL HISTORY  Social History     Tobacco Use    Smoking status: Never    Smokeless tobacco: Never   Vaping Use    Vaping Use: Never used   Substance Use Topics    Alcohol use: Yes     Comment: 1-2 glasses of wine per week.    Drug use: No      Social History     Substance and Sexual Activity   Drug Use No       FAMILY HISTORY  Family History   Problem Relation Age of Onset    Cancer Mother        CURRENT MEDICATIONS  Reviewed.  See Encounter Summary.  "    ALLERGIES  No Known Allergies    PHYSICAL EXAM  VITAL SIGNS: /65   Pulse 80   Temp 36.9 °C (98.4 °F) (Temporal)   Resp 17   Ht 1.676 m (5' 6\")   Wt 83 kg (182 lb 15.7 oz)   SpO2 98%   BMI 29.53 kg/m²   Constitutional: Awake, alert in no apparent distress.  HENT: Normocephalic, Bilateral external ears normal. Nose normal.   Eyes: Conjunctiva normal, non-icteric, EOMI.    Thorax & Lungs: Easy unlabored respirations, Clear to ascultation bilaterally.  Cardiovascular: Regular rate, Regular rhythm, No murmurs, rubs or gallops. Bilateral pulses symmetrical.   Abdomen:  Soft, nontender, nondistended, normal active bowel sounds.   :    Skin: Visualized skin is  Dry, No erythema, No rash.   Musculoskeletal:   2+ bilateral lower extremity edema.    Neurologic: Alert, Grossly non-focal.   Psychiatric: Normal affect, Normal mood  Lymphatic:  No cervical LAD    EKG   12 lead Interpreted by me  Rhythm:  Normal sinus rhythm   Rate: 70  Axis: normal  Ectopy: none  Conduction: normal  ST Segments: no acute change  T Waves: no acute change  Clinical Impression: Normal EKG without acute changes     RADIOLOGY  DX-CHEST-PORTABLE (1 VIEW)   Final Result      Stable small left pleural effusion with associated compressive atelectasis.      US-PELVIC TRANSVAGINAL ONLY    (Results Pending)         COURSE & MEDICAL DECISION MAKING  Pertinent Labs & Imaging studies reviewed. (See chart for details)    Differential diagnoses include but are not limited to: CHF, CKD, hyponatremia    4:36 PM - Medical record reviewed, patient with history of recently diagnosed CHF, on Eliquis, CKD 5.  She was admitted for CHF exacerbation twice this month.  She was noted to have severe hyponatremia on her last hospitalization.  This was not corrected prior to discharge as the patient signed out AGAINST MEDICAL ADVICE.  She is also noted to have an endometrial mass seen on prior imaging.    Decision Making:  This is a pleasant 76 y.o. year old " female who presents with continuing shortness of breath.  She presents at the insistence of the midlevel provider who saw her on the telemedicine consult today.  The patient does have persistent hyponatremia of unclear etiology, suspect combination of fluid overload and CKD.  The patient will need to be admitted for sodium repletion as well as diuresis, explained to her that this will take several days to correct her hyponatremia.  She understands this.  Fortunately she is not an extremis and this can be done carefully over the next 24 to 48 hours.      Heart Score: Moderate          FINAL IMPRESSION  1. Hyponatremia    2. CKD (chronic kidney disease) stage 5, GFR less than 15 ml/min (HCC)    3. Congestive heart failure, unspecified HF chronicity, unspecified heart failure type (HCC)

## 2022-09-23 NOTE — NON-PROVIDER
D/W RO- [facility] has no availability until 11/15/22. Patient needs more emergent follow up.     Called [facility]  They are able to see patient today virtually and have access to labs, OV note through [employer].     Confirmed they are contracted with [facility].     S/W dtjustino Zavala, she is aware of change of referral.

## 2022-09-23 NOTE — H&P
Hospital Medicine History & Physical Note    Date of Service  9/23/2022    Primary Care Physician  José Tian D.O.    Consultants  None    Code Status  Full Code    Chief Complaint  Chief Complaint   Patient presents with    Shortness of Breath     X 1 mon  Admitted for CRF Stage 4 and Left AMA 4 d ago    Peripheral Edema     BLE x 1 mon       History of Presenting Illness  Aimee Mauro is a 76 y.o. female who presented 9/23/2022 with CKD IV, pAfib who presents with hyponatremia 117.  Patient was hospitalized 9/18 for weakness and SOB, found with hyponatremia. Nephrology was consulted, Dr. Najjar had recommended fluid restriction. She remained hyponatremic and she left AMA the next day. She followed up with nephrology telehealth APRN who instructed her to come back to the ED. She had taken one salt tab, but stopped when told by her cardiologist.  She continues to have SOB that comes and goes, limits her activity. Associated with dizziness and weakness where she tires quickly walking. She needs help going upstairs. Denies chest pain. She's also had diarrhea for 2-3 days, about 4 times a day. Denies abd pain or N/V. She continues to have leg edema, unchanged.    I discussed the plan of care with patient and family.    Review of Systems  Review of Systems   Constitutional:  Positive for malaise/fatigue.   Eyes:  Negative for blurred vision.   Respiratory:  Positive for shortness of breath. Negative for cough.    Cardiovascular:  Negative for chest pain.   Gastrointestinal:  Positive for diarrhea. Negative for abdominal pain, nausea and vomiting.   Genitourinary:  Negative for dysuria.   Musculoskeletal:  Negative for back pain and myalgias.   Neurological:  Positive for dizziness and weakness. Negative for headaches.   Psychiatric/Behavioral:  Negative for memory loss.    All other systems reviewed and are negative.    Past Medical History   has a past medical history of CKD (chronic kidney disease), stage IV  (HCC), Congestive heart failure (HCC), Insomnia disorder, and Stroke (HCC) (01/01/1978).    Surgical History   has a past surgical history that includes tubal coagulation laparoscopic bilateral.     Family History  family history includes Cancer in her mother.     Social History   reports that she has never smoked. She has never used smokeless tobacco. She reports current alcohol use. She reports that she does not use drugs.    Allergies  No Known Allergies    Medications  Prior to Admission Medications   Prescriptions Last Dose Informant Patient Reported? Taking?   apixaban (ELIQUIS) 5mg Tab 9/23/2022 at am Patient No No   Sig: Take 1 Tablet by mouth 2 times a day.   carvedilol (COREG) 3.125 MG Tab 9/23/2022 at am Patient No No   Sig: Take 1 Tablet by mouth 2 times a day with meals for 30 days.   sodium bicarbonate 325 MG Tab 9/23/2022 at am Patient No No   Sig: Take 1 Tablet by mouth 2 times a day for 90 days.   sulfamethoxazole-trimethoprim (BACTRIM DS) 800-160 MG tablet 9/23/2022 at am  No No   Sig: Take 0.5 Tablets by mouth 2 times a day for 5 days.   traZODone (DESYREL) 100 MG Tab 9/22/2022 at hs Patient No No   Sig: Take 1 Tablet by mouth every evening.   vitamin D (VITAMIND D3) 1000 UNIT Tab unknown at unknown  No No   Sig: Take 1 Tablet by mouth every day.      Facility-Administered Medications: None       Physical Exam  Temp:  [36.9 °C (98.4 °F)] 36.9 °C (98.4 °F)  Pulse:  [76-84] 84  Resp:  [20] 20  BP: (123-136)/(68-71) 136/68  SpO2:  [96 %-99 %] 99 %  Blood Pressure : 123/71   Temperature: 36.9 °C (98.4 °F)   Pulse: 76   Respiration: 20   Pulse Oximetry: 96 %       Physical Exam  Vitals and nursing note reviewed.   Constitutional:       General: She is not in acute distress.     Appearance: She is not toxic-appearing.   HENT:      Head: Normocephalic.      Mouth/Throat:      Mouth: Mucous membranes are moist.   Eyes:      General:         Right eye: No discharge.         Left eye: No discharge.    Cardiovascular:      Rate and Rhythm: Normal rate and regular rhythm.   Pulmonary:      Effort: No respiratory distress.      Breath sounds: No wheezing or rales.   Abdominal:      Palpations: Abdomen is soft.      Tenderness: There is no abdominal tenderness. There is no guarding or rebound.   Musculoskeletal:      Cervical back: Neck supple.      Right lower leg: Edema (3+) present.      Left lower leg: Edema (3+) present.   Skin:     General: Skin is warm and dry.   Neurological:      General: No focal deficit present.      Mental Status: She is alert and oriented to person, place, and time.       Laboratory:  Recent Labs     09/23/22  1614   WBC 11.6*   RBC 4.16*   HEMOGLOBIN 12.2   HEMATOCRIT 33.6*   MCV 80.8*   MCH 29.3   MCHC 36.3*   RDW 40.0   PLATELETCT 87*   MPV 10.8     Recent Labs     09/23/22  1614   SODIUM 117*   POTASSIUM 5.1   CHLORIDE 87*   CO2 17*   GLUCOSE 103*   BUN 50*   CREATININE 3.53*   CALCIUM 8.0*     Recent Labs     09/23/22  1614   ALTSGPT 15   ASTSGOT 23   ALKPHOSPHAT 91   TBILIRUBIN 0.2   GLUCOSE 103*         Recent Labs     09/23/22  1614   NTPROBNP 4806*         Recent Labs     09/23/22  1614   TROPONINT 36*       Imaging:  DX-CHEST-PORTABLE (1 VIEW)   Final Result      Stable small left pleural effusion with associated compressive atelectasis.      US-PELVIC TRANSVAGINAL ONLY    (Results Pending)     EKG - SR    Assessment/Plan:  Justification for Admission Status  I anticipate this patient will require at least two midnights for appropriate medical management, necessitating inpatient admission because she will need slow correction of hyponatremia over several days    Patient will need a  bed on EMERGENCY service .  The need is secondary to severe hyponatremia.    * Hyponatremia- (present on admission)  Assessment & Plan  Recently admitted for this, nephrology had recommended fluid restriction. Ordered 1800cc restriction  Check UA, urine Na and creat, urine osm  Trend Na q6h, goal  increase max 8mmol/L in 24h  Given severity, will monitor on telemetry    Diarrhea  Assessment & Plan  New last few days  Was recently on antibiotic  Check for C diff    Thrombocytopenia (HCC)- (present on admission)  Assessment & Plan  Continues to be low  Trend, hold Eliquis if below 50k    Vitamin D deficiency- (present on admission)  Assessment & Plan  Cont vit D    Paroxysmal atrial fibrillation (HCC)- (present on admission)  Assessment & Plan  Continue Eliquis and coreg    Uterine mass- (present on admission)  Assessment & Plan  Transvaginal US ordered    Stage 4 chronic kidney disease (HCC)- (present on admission)  Assessment & Plan  Monitor renal labs  Cont sodium bicarb  Renal diet  Nephrology consult in the am    Bilateral leg swelling- (present on admission)  Assessment & Plan  TTE showed intact EF  May be from CKD      VTE prophylaxis: therapeutic anticoagulation with Eliquis

## 2022-09-24 NOTE — ED NOTES
Pt medicated per MAR, no questions about medication. Pt provided with meal tray. 118mL of fluid on meal tray. Pt aware of care plan with no questions at this time.

## 2022-09-24 NOTE — PROGRESS NOTES
Hospital Medicine Daily Progress Note    Date of Service  9/24/2022    Chief Complaint  Aimee Mauro is a 76 y.o. female admitted 9/23/2022 with abnormal labs    Hospital Course  Patient recently left AGAINST MEDICAL ADVICE when her sodium level was 116.  The patient at home continue to have a low sodium level and when she followed up with the nephrology APRN she was told to come back to the emergency room so she did come back.  The patient is admitted for evaluation.  Her renal functions are worse.  Her sodium level at this point is improved compared to previous and is improving with fluid restriction.  Nephrology has seen the patient at this point they recommend renal biopsy as well as cancer work-up due to her endometrial mass.    Interval Problem Update  Transvaginal ultrasound ordered  CT of the chest abdomen pelvis ordered  Renal biopsy ordered  Fluid restriction  Monitor sodium levels  Pain management    I have discussed this patient's plan of care and discharge plan at IDT rounds today with Case Management, Nursing, Nursing leadership, and other members of the IDT team.    Consultants/Specialty  nephrology    Code Status  Full Code    Disposition  Patient is not medically cleared for discharge.   Anticipate discharge to to home with close outpatient follow-up.  I have placed the appropriate orders for post-discharge needs.    Review of Systems  Review of Systems   Constitutional:  Positive for malaise/fatigue. Negative for chills, diaphoresis and fever.   HENT: Negative.     Eyes: Negative.  Negative for double vision.   Respiratory: Negative.  Negative for cough, hemoptysis and wheezing.    Cardiovascular: Negative.  Negative for chest pain, palpitations and leg swelling.   Gastrointestinal: Negative.  Negative for abdominal pain, blood in stool, constipation, diarrhea, heartburn, nausea and vomiting.   Genitourinary: Negative.  Negative for frequency, hematuria and urgency.   Musculoskeletal:  Negative.  Negative for joint pain.   Skin: Negative.  Negative for itching and rash.   Neurological: Negative.  Negative for dizziness, focal weakness, seizures, loss of consciousness and headaches.   Endo/Heme/Allergies: Negative.  Does not bruise/bleed easily.   Psychiatric/Behavioral: Negative.  Negative for suicidal ideas. The patient is not nervous/anxious.    All other systems reviewed and are negative.     Physical Exam  Temp:  [36.3 °C (97.3 °F)-37.5 °C (99.5 °F)] 36.8 °C (98.2 °F)  Pulse:  [73-94] 77  Resp:  [17-20] 18  BP: (109-139)/(57-88) 124/76  SpO2:  [91 %-100 %] 98 %    Physical Exam  Vitals and nursing note reviewed. Exam conducted with a chaperone present.   Constitutional:       Appearance: Normal appearance. She is well-developed and underweight. She is not ill-appearing.   HENT:      Head: Normocephalic and atraumatic.      Right Ear: Tympanic membrane, ear canal and external ear normal.      Left Ear: Tympanic membrane, ear canal and external ear normal.      Nose: Nose normal. No congestion or rhinorrhea.      Mouth/Throat:      Mouth: Mucous membranes are moist.      Pharynx: Oropharynx is clear.   Eyes:      Extraocular Movements: Extraocular movements intact.      Conjunctiva/sclera: Conjunctivae normal.      Pupils: Pupils are equal, round, and reactive to light.   Neck:      Thyroid: No thyroid mass.      Vascular: No carotid bruit or JVD.   Cardiovascular:      Rate and Rhythm: Normal rate and regular rhythm.      Pulses: Normal pulses.      Heart sounds: Normal heart sounds, S1 normal and S2 normal.   Pulmonary:      Effort: Pulmonary effort is normal.      Breath sounds: Normal breath sounds and air entry.   Abdominal:      General: Abdomen is flat. Bowel sounds are normal.      Palpations: Abdomen is soft.   Musculoskeletal:         General: Normal range of motion.      Cervical back: Normal range of motion and neck supple. No edema or rigidity.      Right lower leg: Edema present.       Left lower leg: Edema present.      Right foot: Normal range of motion. No deformity or foot drop.      Left foot: Normal range of motion. No deformity or foot drop.   Feet:      Right foot:      Skin integrity: Skin integrity normal. No ulcer.      Left foot:      Skin integrity: Skin integrity normal. No ulcer.   Lymphadenopathy:      Head:      Right side of head: No submental adenopathy.      Left side of head: No submental adenopathy.      Cervical: No cervical adenopathy.      Right cervical: No superficial cervical adenopathy.     Left cervical: No superficial cervical adenopathy.      Upper Body:      Right upper body: No supraclavicular adenopathy.      Left upper body: No supraclavicular adenopathy.      Lower Body: No right inguinal adenopathy. No left inguinal adenopathy.   Skin:     General: Skin is warm and dry.      Capillary Refill: Capillary refill takes less than 2 seconds.      Findings: No abrasion or wound.   Neurological:      Mental Status: She is alert and oriented to person, place, and time. Mental status is at baseline.      GCS: GCS eye subscore is 4. GCS verbal subscore is 5. GCS motor subscore is 6.      Sensory: Sensation is intact.      Motor: Motor function is intact. No weakness.      Coordination: Coordination is intact.   Psychiatric:         Mood and Affect: Mood and affect normal.         Speech: Speech normal.         Behavior: Behavior is cooperative.         Thought Content: Thought content normal.       Fluids    Intake/Output Summary (Last 24 hours) at 9/24/2022 1523  Last data filed at 9/24/2022 1400  Gross per 24 hour   Intake 901 ml   Output --   Net 901 ml       Laboratory  Recent Labs     09/23/22  1614 09/24/22  0517   WBC 11.6* 7.2   RBC 4.16* 3.34*   HEMOGLOBIN 12.2 9.7*   HEMATOCRIT 33.6* 27.6*   MCV 80.8* 82.6   MCH 29.3 29.0   MCHC 36.3* 35.1*   RDW 40.0 41.5   PLATELETCT 87* 60*   MPV 10.8 10.6     Recent Labs     09/23/22  1614 09/23/22  2230 09/24/22  0517  09/24/22  1101   SODIUM 117* 117* 119* 118*   POTASSIUM 5.1  --  5.0  --    CHLORIDE 87*  --  90*  --    CO2 17*  --  18*  --    GLUCOSE 103*  --  84  --    BUN 50*  --  52*  --    CREATININE 3.53*  --  3.70*  --    CALCIUM 8.0*  --  7.3*  --                    Imaging  US-PELVIC COMPLETE (TRANSABDOMINAL/TRANSVAGINAL) (COMBO)   Final Result      1.  Redemonstrated is large hypoechoic Central uterine mass. Malignancy must be excluded.      DX-CHEST-PORTABLE (1 VIEW)   Final Result      Stable small left pleural effusion with associated compressive atelectasis.      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    Diarrhea  Assessment & Plan  Patient is in isolation for possible C. difficile rule out although her stools at this point are well formed.  Doubt that this will become Clostridium difficile colitis infection    Thrombocytopenia (HCC)- (present on admission)  Assessment & Plan  Holding anticoagulation  Monitor platelet counts  Avoid any medications that would further drop her platelets.    Stage 4 chronic kidney disease (HCC)- (present on admission)  Assessment & Plan  Patient will need biopsies  This has been arranged for Monday morning.    Vitamin D deficiency- (present on admission)  Assessment & Plan  Oral vitamin D supplementation continued    Paroxysmal atrial fibrillation (HCC)- (present on admission)  Assessment & Plan  Currently remains on Coreg for rate control  Anticoagulation with Eliquis for CVA prevention.    Bilateral leg  swelling- (present on admission)  Assessment & Plan  Optimize fluid status         VTE prophylaxis: SCDs/TEDs    I have performed a physical exam and reviewed and updated ROS and Plan today (9/24/2022). In review of yesterday's note (9/23/2022), there are no changes except as documented above.

## 2022-09-24 NOTE — ASSESSMENT & PLAN NOTE
Patient is in isolation for possible C. difficile rule out although her stools at this point are well formed.  Doubt that this will become Clostridium difficile colitis infection

## 2022-09-24 NOTE — CARE PLAN
The patient is Stable - Low risk of patient condition declining or worsening         Progress made toward(s) clinical / shift goals:  not on physical or respiratory distress.

## 2022-09-24 NOTE — PROGRESS NOTES
Telemetry Shift Summary    Rhythm NSR  HR Range 60-80  Ectopy reg  Measurements .16/.10/.40        Normal Values  Rhythm SR  HR Range    Measurements 0.12-0.20 / 0.06-0.10  / 0.30-0.52

## 2022-09-24 NOTE — ED NOTES
Pts updated on care plan, no questions. Call light in reach, lights turned off at this time or pt comfort.

## 2022-09-24 NOTE — PROGRESS NOTES
Odalis from Lab called with critical result of Na 118 at 1135. Critical lab result read back to Odalis.     Dr. Torres notified of critical lab result at 1142. Critical lab result read back by Dr. Torres.

## 2022-09-24 NOTE — ASSESSMENT & PLAN NOTE
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

## 2022-09-24 NOTE — ASSESSMENT & PLAN NOTE
Holding anticoagulation  Monitor platelet counts  Avoid any medications that would further drop her platelets.

## 2022-09-24 NOTE — ASSESSMENT & PLAN NOTE
Recently admitted for this, nephrology had recommended fluid restriction. Ordered 1800cc restriction  Check UA, urine Na and creat, urine osm  Trend Na q6h, goal increase max 8mmol/L in 24h  Given severity, will monitor on telemetry

## 2022-09-24 NOTE — PROGRESS NOTES
Telemetry Shift Summary    Rhythm SR  HR Range 61-84  Ectopy rPAC  Measurements 0.18/0.10/0.40      Normal Values  Rhythm SR  HR Range:   Measurements: 0.12-0.20/0.06-0.10/0.30-0.52

## 2022-09-24 NOTE — PROGRESS NOTES
4 Eyes Skin Assessment Completed by KEVIN Doshi and KEVIN Ramirez.    Head WDL  Ears WDL  Nose WDL  Mouth WDL  Neck WDL  Breast/Chest WDL  Shoulder Blades WDL  Spine WDL  (R) Arm/Elbow/Hand WDL  (L) Arm/Elbow/Hand WDL  Abdomen WDL  Groin WDL  Scrotum/Coccyx/Buttocks WDL  (R) Leg WDL  (L) Leg WDL  (R) Heel/Foot/Toe WDL  (L) Heel/Foot/Toe WDL          Devices In Places Tele Box      Interventions In Place N/A    Possible Skin Injury No    Pictures Uploaded Into Epic N/A  Wound Consult Placed N/A  RN Wound Prevention Protocol Ordered No

## 2022-09-24 NOTE — CARE PLAN
The patient is Stable - Low risk of patient condition declining or worsening    Shift Goals  Clinical Goals: monitor Na, transvaginal US, monitor urine output  Patient Goals: rest    Progress made toward(s) clinical / shift goals: Na being monitored, transvaginal US completed, urine output being monitor. CT of chest/ABD/pelvis ordered and oral contrast started.       Problem: Knowledge Deficit - Standard  Goal: Patient and family/care givers will demonstrate understanding of plan of care, disease process/condition, diagnostic tests and medications  Outcome: Progressing       Patient is not progressing towards the following goals:

## 2022-09-25 NOTE — PROGRESS NOTES
Nephrology Daily Progress Note    Date of Service  9/25/2022    Chief Complaint  76 y.o. female admitted 9/23/2022 with respiratory failure, found to have SAY, hyponatremia    Interval Problem Update  Event last 24-hour noted  Patient overall feels okay, no chest pain, no shortness of breath.  Abdominal CT scan report was noted    Review of Systems  Review of Systems   Constitutional:  Negative for chills, fever and malaise/fatigue.   Respiratory:  Negative for cough and shortness of breath.    Cardiovascular:  Negative for chest pain and leg swelling.   Gastrointestinal:  Negative for nausea and vomiting.   Genitourinary:  Negative for dysuria, frequency and urgency.   All other systems reviewed and are negative.     Physical Exam  Temp:  [36.4 °C (97.5 °F)-36.7 °C (98.1 °F)] 36.4 °C (97.5 °F)  Pulse:  [71-85] 83  Resp:  [18-20] 18  BP: (114-150)/(49-77) 142/72  SpO2:  [95 %-97 %] 95 %    Physical Exam  Vitals and nursing note reviewed.   Constitutional:       General: She is awake. She is not in acute distress.     Appearance: She is well-developed. She is not ill-appearing or diaphoretic.   HENT:      Head: Normocephalic and atraumatic.      Right Ear: External ear normal.      Left Ear: External ear normal.      Nose: Nose normal. No rhinorrhea.      Mouth/Throat:      Pharynx: No oropharyngeal exudate or posterior oropharyngeal erythema.   Eyes:      General: No scleral icterus.        Right eye: No discharge.         Left eye: No discharge.      Conjunctiva/sclera: Conjunctivae normal.   Neck:      Vascular: No carotid bruit.   Cardiovascular:      Rate and Rhythm: Normal rate and regular rhythm.      Heart sounds: No murmur heard.  Pulmonary:      Effort: Pulmonary effort is normal. No respiratory distress.      Breath sounds: Normal breath sounds.   Abdominal:      General: Abdomen is flat. There is no distension.      Palpations: Abdomen is soft. There is no mass.   Musculoskeletal:         General: No  tenderness.      Cervical back: No rigidity. No muscular tenderness.      Right lower leg: No edema.      Left lower leg: No edema.   Skin:     General: Skin is warm and dry.      Coloration: Skin is not jaundiced.   Neurological:      General: No focal deficit present.      Mental Status: She is alert and oriented to person, place, and time. Mental status is at baseline.   Psychiatric:         Mood and Affect: Mood normal.         Behavior: Behavior normal.         Thought Content: Thought content normal.       Fluids    Intake/Output Summary (Last 24 hours) at 9/25/2022 1249  Last data filed at 9/25/2022 1000  Gross per 24 hour   Intake 1390 ml   Output --   Net 1390 ml       Laboratory  Recent Labs     09/23/22  1614 09/24/22  0517   WBC 11.6* 7.2   RBC 4.16* 3.34*   HEMOGLOBIN 12.2 9.7*   HEMATOCRIT 33.6* 27.6*   MCV 80.8* 82.6   MCH 29.3 29.0   MCHC 36.3* 35.1*   RDW 40.0 41.5   PLATELETCT 87* 60*   MPV 10.8 10.6     Recent Labs     09/23/22  1614 09/23/22  2230 09/24/22  0517 09/24/22  1101 09/24/22  2131 09/25/22  0240 09/25/22  0808   SODIUM 117*   < > 119*   < > 118* 118* 119*  119*   POTASSIUM 5.1  --  5.0  --   --   --  5.3   CHLORIDE 87*  --  90*  --   --   --  88*   CO2 17*  --  18*  --   --   --  18*   GLUCOSE 103*  --  84  --   --   --  87   BUN 50*  --  52*  --   --   --  54*   CREATININE 3.53*  --  3.70*  --   --   --  3.93*   CALCIUM 8.0*  --  7.3*  --   --   --  7.6*    < > = values in this interval not displayed.         Recent Labs     09/23/22  1614   NTPROBNP 4806*           Imaging  CT-CHEST,ABDOMEN,PELVIS W/O   Final Result      1.  Retroperitoneal lymphadenopathy, metastatic disease likely over lymphoproliferative disorder/lymphoma.   2.  Left adrenal mass is nonspecific, adrenal metastasis versus lipid poor adenoma.   3.  Prominent sized uterus. Correlate with separate pelvic ultrasound report.   4.  Small bilateral pleural effusions.   5.  Large hiatal hernia.   6.  Colonic  diverticulosis.      US-PELVIC COMPLETE (TRANSABDOMINAL/TRANSVAGINAL) (COMBO)   Final Result      1.  Redemonstrated is large hypoechoic Central uterine mass. Malignancy must be excluded.      DX-CHEST-PORTABLE (1 VIEW)   Final Result      Stable small left pleural effusion with associated compressive atelectasis.      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  Continue to evaluate daily  Consider a kidney biopsy  Oncology work-up  Free water restriction  Serial sodium level check  Renal diet  Daily labs  Renal dose all meds  Avoid nephrotoxins  Prognosis guarded.  Plan discussed with Dr. Torres

## 2022-09-25 NOTE — PROGRESS NOTES
4 Eyes Skin Assessment Completed by KEVIN Reyes and ALEE Marquez.(Was in doing pelvic exam, we went ahead & did skin check).    Head WDL  Ears WDL  Nose WDL  Mouth WDL  Neck WDL  Breast/Chest WDL  Shoulder Blades WDL  Spine WDL  (R) Arm/Elbow/Hand Bruising  (L) Arm/Elbow/Hand Bruising  Abdomen WDL  Groin WDL  Scrotum/Coccyx/Buttocks WDL  (R) Leg Swelling  (L) Leg Swelling  (R) Heel/Foot/Toe WDL  (L) Heel/Foot/Toe WDL    Hammer toe to rt toe, some IAD, and bruising to bilat arms. No areas of concern for breakdown at this time.         Devices In Place: PIV.       Interventions In Place Pressure Redistribution Mattress    Possible Skin Injury No    Pictures Uploaded Into Epic N/A  Wound Consult Placed N/A  RN Wound Prevention Protocol Ordered No

## 2022-09-25 NOTE — PROGRESS NOTES
Manjula from Lab called with critical result of Ob=101 at 0310. Critical lab result read back to Manjula.   Dr. Lovell notified of critical lab result at 0312.  Critical lab result read back by Dr. Lovell.

## 2022-09-25 NOTE — CONSULTS
DATE OF SERVICE:  09/24/2022     NEPHROLOGY CONSULTATION     REQUESTING PHYSICIAN:  Pauline Torres MD     REASON FOR CONSULTATION:  Acute kidney injury and volume overload.     The patient seen and examined, medical record reviewed.     HISTORY OF PRESENT ILLNESS:  The patient is a 76-year-old lady with past   medical history significant for chronic kidney disease, presented initially to   the hospital on 08/31/2022 with shortness of breath and lower extremity   edema.  The patient was discharged on 09/02 after being diagnosed with atrial   fibrillation, came back on 09/18 with similar complaint of generalized   weakness and shortness of breath.  At that time, her creatinine was elevated   at 2.7.  Unfortunately, we do not have any prior labs of her kidney disease   before 08/2022, also at that admission, the patient was hyponatremic.  I was   called to evaluate the patient for renal failure and hyponatremia.  However,   patient did sign out of the hospital against medical advice, she came back   last evening with persistent lower extremity edema and shortness of breath.    During this hospitalization, the patient was found to have acute kidney injury   with a creatinine up to 3.5 yesterday and today is 3.7.  Also, persistent   hyponatremia with sodium today at 118.     The patient also was found on the prior examination to have pelvic mass.     The patient has no hematuria, no dysuria, no recent use of NSAIDs or IV   contrast exposure.     PAST MEDICAL HISTORY:  Significant for:  1.  Lower extremity edema.  2.  Renal failure.     ALLERGIES:  No known drug allergies.     SOCIAL HISTORY:  The patient has no smoking history.     FAMILY HISTORY:  Positive for malignancy in her mother.     REVIEW OF SYSTEMS:  The patient feels tired.  She has dyspnea on exertion.    She had one episode of diarrhea yesterday, all other review of systems   negative except outlined in the history of present illness.     MEDICATIONS:   Reviewed.     PHYSICAL EXAMINATION:    GENERAL:  The patient appears ill, but no apparent distress.  VITAL SIGNS:  Showed blood pressure of 124/76, heart rate was 77, respiratory   rate was 18.  HEENT:  Normocephalic, atraumatic.  Sclerae are anicteric.  Pupils are   reactive.  Nose normal.  Mucous membrane is moist.  NECK:  No lymphadenopathy, no JVD, no thyroid mass.  CHEST:  Normal.  LUNGS:  Clear to auscultation.  HEART:  S1, S2.  ABDOMEN:  Soft, nontender.  No hepatosplenomegaly.  There is no inguinal   lymphadenopathy.  EXTREMITIES:  There is +2 edema bilaterally.  SKIN:  No skin rash.  NEUROLOGIC:  The patient is alert and oriented x3.  MOOD:  The patient is anxious.     LABORATORY DATA:  Her recent labs from today were reviewed.     DIAGNOSTIC DATA:  She also had a chest x-ray done yesterday, I reviewed the   image myself, which showed pleural effusion.     The patient had an abdominal ultrasound done on 09/20, which showed no   hydronephrosis.     ASSESSMENT:    1.  Acute kidney injury on chronic kidney disease of undetermined stage, the   etiology is not very clear; however, I am concerned the patient has nephrotic   syndrome and acute glomerulonephritis, considering the patient has significant   proteinuria, the etiology of this could be acute GN from paraneoplastic   syndrome (like membranous nephropathy) or auto-immune disease.  2.  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  3.  Volume overload.  4.  Hypoalbuminemia.  5.  High proteinuria.  6.  Elevated ASHKAN.  7.  Anemia.  8.  Thrombocytopenia.     PLAN:    1.  There is no acute need for renal replacement therapy.  2.  Continue diuretics to manage lower extremity edema if blood pressure   tolerates.  3.  Check ANCA, complement level.  4.  We will recommend to do a kidney biopsy to evaluate kidney disease.  5.  As for the hyponatremia, we recommend to do malignancy workup.  6.  Free water restriction.  7.  Serial sodium  level check.  8.  Renal diet.  9.  Renal dose all medications.  10.  Prognosis is guarded.  11.  As for the anemia and thrombocytopenia but I would recommend a full workup including potential hematology   consult.     Plan discussed in detail with Dr. Torres.        ______________________________  FADI NAJJAR, MD FN/JOHANNA/JAN    DD:  09/24/2022 12:57  DT:  09/24/2022 14:05    Job#:  [id number]

## 2022-09-25 NOTE — CARE PLAN
The patient is Watcher - Medium risk of patient condition declining or worsening    Shift Goals  Clinical Goals: monitor Na, neuro symptoms, I&Os  Patient Goals: rest    Progress made toward(s) clinical / shift goals:  Sodium slowly trending upward, neuro status unchanged, fluid restriction continued and patient is going to be transferred to [facility] for further treatment.       Problem: Knowledge Deficit - Standard  Goal: Patient and family/care givers will demonstrate understanding of plan of care, disease process/condition, diagnostic tests and medications  Outcome: Met       Patient is not progressing towards the following goals:

## 2022-09-25 NOTE — DISCHARGE PLANNING
Anticipated Discharge Disposition: Transfer to [facility]    Action: Transfer from [facility] 3[facility]  with Dr Torres sending to [facility] R309 with Dr Sudeep Urbina receiving  via [facility]  ETA 12:30 PCS sent. COBRA completed. Chart copy given to . Needs stickers. rN to call report to .    Barriers to Discharge: Transport 12:30    Plan: Will cont to follow as needed

## 2022-09-25 NOTE — CONSULTS
Gynecologic Oncology Consultation    Date: 2022    Requesting Physician: Dr. Torres    Consulting Physician: GENESIS Perez /Dr. Jed Ames    Reason for consultation: Endometrial mass    CC: Shortness of breath and leg swelling.     HPI: This is a 76 y.o.  A1 female who is presenting with shortness of breath, leg swelling, and fatigue. Her past medical history is significant for  paroxysmal Afib, CKD, HTN, insomnia, CVA after the birth of twins, and hearing loss. Surgical history is significant for tubal ligation at age 30. She initially presented to the ER on 22 with c/o bilateral leg swelling and SOB. Her BMP was elevated but echo revealed LVEF of 65% normal systolic function, mildly dilated left atrium and mild eccentric aortic insufficiency. She was also found to kidney disease. She responded to diuretics and discharged home with cardiology follow up.    She presented to the ER on on 22 with recurrent SOB and generalized weakness.  She was found to be hyponatremic with Na+ of 117 with SAY. Nephology was consulted. V/Q scan showed low probability of PE. Abdominal US on 22 showed a uterine mass. Patient left the hospital AMA on 22. She returned to the ER on 22 on the instruction of her outpatient  Nephrology APRN. Pelvic US on  showed uterus measuring 10.2 x 7.2 x 5.1 cm and hypoechoic uterine mass measuring about 5.3 x 4.5 x 4.5 cm. CT that same day showed retroperitoneal lymphadenopathy and nonspecific left adrenal mass. 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 Given her endometrial mass patient was transferred to [facility] and GYN Oncology was consulted.     Patient was seen and examined with her  at bedside today. She reports ongoing SOB, leg swelling, and fatigue. Also reports light, intermittent PMB x 2 months and blood in  urine x 1 week. Has had nausea off and on, but no vomiting. No pelvic or abdominal pain. Reports decreased urine output,  decreased appetite, and early satiety. Unsure if she has had weight loss. Denies abdominal bloating, pain,  changes in bowel habits, dysuria, or urinary frequency. She cannot recall when her last Pap was but reports history of normal. Family history is significant for mother with uterine cancer diagnosed at age 62. Denies history of estrogen use. She has difficulty walking secondary to long standing foot troubles.     Review of Systems   Constitutional:  Positive for malaise/fatigue. Negative for chills and fever.   HENT:  Positive for hearing loss. Negative for congestion.    Respiratory:  Positive for shortness of breath and wheezing. Negative for cough and sputum production.    Cardiovascular:  Positive for leg swelling. Negative for chest pain, palpitations and orthopnea.   Gastrointestinal:  Positive for nausea. Negative for abdominal pain, blood in stool, constipation, diarrhea, heartburn and vomiting.   Genitourinary:  Positive for hematuria. Negative for dysuria, flank pain, frequency and urgency.   Musculoskeletal:  Negative for back pain, myalgias and neck pain.   Skin:  Negative for rash.   Neurological:  Positive for weakness. Negative for dizziness, tingling, sensory change, speech change, focal weakness, seizures, loss of consciousness and headaches.   Endo/Heme/Allergies:  Bruises/bleeds easily.      Past Medical History:   Diagnosis Date    CKD (chronic kidney disease), stage IV (HCC)     Congestive heart failure (HCC)     Insomnia disorder     Stroke (HCC) 1978       Past Surgical History:   Procedure Laterality Date    TUBAL COAGULATION LAPAROSCOPIC BILATERAL         OB/GYN History: , NSVDx 3  LMP age 40, menarche age 10, regular cycles.   Last pap unknown. Denies history of abnormal  Not sexually active  Denies past or present HRT use  S/p tubal ligation at age  30    Current Facility-Administered Medications   Medication Dose Route Frequency Provider Last Rate Last Admin    acetaminophen (Tylenol) tablet 650 mg  650 mg Oral Q6HRS PRN Pauline Torres M.D.        carvedilol (COREG) tablet 3.125 mg  3.125 mg Oral BID WITH MEALS Pauline Torres M.D.        ondansetron (ZOFRAN ODT) dispertab 4 mg  4 mg Oral Q4HRS PRN Pauline Torres M.D.        ondansetron (ZOFRAN) syringe/vial injection 4 mg  4 mg Intravenous Q4HRS PRN Pauline Torres M.D.        Pharmacy Consult Request  1 Each Other PHARMACY TO DOSE Pauline Torres M.D.        sodium bicarbonate tablet 325 mg  325 mg Oral BID Pauline Torres M.D.        traZODone (DESYREL) tablet 100 mg  100 mg Oral Nightly Pauline Torres M.D.        [START ON 9/26/2022] vitamin D3 (cholecalciferol) tablet 1,000 Units  1,000 Units Oral DAILY Pauline Torres M.D.           Allergies:  Patient has no known allergies.    Social History     Socioeconomic History    Marital status:      Spouse name: Not on file    Number of children: Not on file    Years of education: Not on file    Highest education level: Not on file   Occupational History    Not on file   Tobacco Use    Smoking status: Never    Smokeless tobacco: Never   Vaping Use    Vaping Use: Never used   Substance and Sexual Activity    Alcohol use: Yes     Comment: 1-2 glasses of wine per week.    Drug use: No    Sexual activity: Not on file   Other Topics Concern    Not on file   Social History Narrative    Not on file     Social Determinants of Health     Financial Resource Strain: Low Risk     Difficulty of Paying Living Expenses: Not hard at all   Food Insecurity: No Food Insecurity    Worried About Running Out of Food in the Last Year: Never true    Ran Out of Food in the Last Year: Never true   Transportation Needs: No Transportation Needs    Lack of Transportation (Medical): No    Lack of Transportation (Non-Medical): No   Physical Activity: Not on file   Stress: Not on file    Social Connections: Not on file   Intimate Partner Violence: Not on file   Housing Stability: Not on file       Family History   Problem Relation Age of Onset    Cancer Mother          Physical Exam:  /69   Pulse 74   Temp 36.6 °C (97.9 °F) (Temporal)   Resp 16   Wt 82.2 kg (181 lb 3.5 oz)   SpO2 95%         Physical Exam  Constitutional:       General: She is not in acute distress.     Appearance: She is ill-appearing.   HENT:      Head: Normocephalic.   Eyes:      General:         Right eye: No discharge.         Left eye: No discharge.   Cardiovascular:      Rate and Rhythm: Normal rate and regular rhythm.      Pulses: Normal pulses.      Heart sounds: No murmur heard.  Pulmonary:      Effort: Pulmonary effort is normal.      Breath sounds: Decreased air movement present.   Abdominal:      General: There is no distension.      Palpations: Abdomen is soft.      Tenderness: There is no abdominal tenderness. There is no guarding.   Genitourinary:     Vagina: Vaginal discharge present. No tenderness.      Cervix: No cervical motion tenderness, friability or lesion.      Uterus: Not enlarged and not tender.       Adnexa:         Right: No mass or tenderness.          Left: No mass or tenderness.        Comments: No cul-de-sac nodularity on bimanual exam. Vaginal bleeding noted on exam.   Musculoskeletal:         General: Swelling present.      Right lower leg: Edema present.      Left lower leg: Edema present.   Skin:     General: Skin is warm and dry.      Capillary Refill: Capillary refill takes 2 to 3 seconds.      Findings: Bruising present.   Neurological:      Mental Status: She is alert and oriented to person, place, and time.      Cranial Nerves: No cranial nerve deficit.        Labs:  Recent Labs     09/23/22  1614 09/24/22  0517   WBC 11.6* 7.2   RBC 4.16* 3.34*   HEMOGLOBIN 12.2 9.7*   HEMATOCRIT 33.6* 27.6*   MCV 80.8* 82.6   MCH 29.3 29.0   MCHC 36.3* 35.1*   RDW 40.0 41.5   PLATELETCT 87*  60*   MPV 10.8 10.6     Recent Labs     09/23/22  1614 09/23/22  2230 09/24/22  0517 09/24/22  1101 09/24/22  2131 09/25/22  0240 09/25/22  0808   SODIUM 117*   < > 119*   < > 118* 118* 119*  119*   POTASSIUM 5.1  --  5.0  --   --   --  5.3   CHLORIDE 87*  --  90*  --   --   --  88*   CO2 17*  --  18*  --   --   --  18*   GLUCOSE 103*  --  84  --   --   --  87   BUN 50*  --  52*  --   --   --  54*   CREATININE 3.53*  --  3.70*  --   --   --  3.93*   CALCIUM 8.0*  --  7.3*  --   --   --  7.6*    < > = values in this interval not displayed.         Recent Labs     09/23/22  1614   ASTSGOT 23   ALTSGPT 15   TBILIRUBIN 0.2   ALKPHOSPHAT 91   GLOBULIN 3.6*         Assessment/Plan: This is a 76 y.o. female who presents with SOB and leg swelling, found to have severe hyponatremia, acute on chronic renal failure, found to have a uterine mass and PMB.     Uterine mass: Hypoechoic uterine mass measuring about 5.3 x 4.5 x 4.5 cm on US and CT with retroperitoneal lymphadenopathy. PMB x 2 months and vaginal bleeding on exam. Concerning for uterine malignancy, however, could also be due to uterine fibroid or endometrial polyp. Obtain .  Case discussed with Dr. Ames. Patient will need endometrial biopsy for pathologic evaluation in the future, however, her kidney disease will need further evaluation prior.   Hyponatremia: SIDH secondary paraneoplastic syndrome vs autoimmune disease per Nephrology. Workup ongoing.   SAY on CKD: Followed by nephrology. Plan for renal biopsy, but on hold until 48 hours after last apixaban dose.   Thrombocytopenia: Since at least Aug of this year. Unclear etiology.   Hx of paroxysmal a-fib: Currently in SR. Hold anticoagulation for sx.   Hx HTN: Home Coreg.       Thank you for for allowing us to participate in this patient's care. We will continue to follow. Please feel free to contact us for any questions or if we can be of any further assistance.

## 2022-09-25 NOTE — DISCHARGE INSTRUCTIONS
Discharge Instructions    Discharged to home by ambulance with escort. Discharged via ambulance, hospital escort: Yes.  Special equipment needed: Not Applicable    Be sure to schedule a follow-up appointment with your primary care doctor or any specialists as instructed.     Discharge Plan:   Diet Plan: Discussed  Activity Level: Discussed  Confirmed Follow up Appointment: No (Comments) (transferring)  Confirmed Symptoms Management: Discussed  Medication Reconciliation Updated: Yes    I understand that a diet low in cholesterol, fat, and sodium is recommended for good health. Unless I have been given specific instructions below for another diet, I accept this instruction as my diet prescription.   Other diet: as tolerated    Special Instructions: None    -Is this patient being discharged with medication to prevent blood clots?  No    Is patient discharged on Warfarin / Coumadin?   No

## 2022-09-25 NOTE — DISCHARGE SUMMARY
Discharge Summary    CHIEF COMPLAINT ON ADMISSION  Chief Complaint   Patient presents with    Shortness of Breath     X 1 mon  Admitted for CRF Stage 4 and Left AMA 4 d ago    Peripheral Edema     BLE x 1 mon       Reason for Admission  Abnormal Labs     Admission Date  9/23/2022    CODE STATUS  Full Code    HPI & HOSPITAL COURSE  Ms Aimee Mauro is a 76-year-old female who initially reported to the hospitalOn 9/18/2022 with shortness of breath and generalized weakness.  Patient at the time was a very poor historian was found to have however acute renal failure with hyponatremia.  The patient was admitted to the hospital for further treatment and management.  Nephrology was consulted.  Fluid restriction was recommended for the hyponatremia.  Work-up revealed that the patient had an endometrial mass.  Additional studies were requested to evaluate endometrial mass.  Patient then however suddenly at that time refused further treatment and signed out AGAINST MEDICAL ADVICE.  We explained to her that this situation could be much worse she could die from this however the patient did not want to stay in the hospital.  Patient was at home for several days when the sodium levels were reevaluated and once again found to be extremely low.  On 9/23/2022 the patient returned back to the emergency room at the request of her APN.  Sodium level at that point in time was 117.  Patient was readmitted to the medical floor for further treatment and management.  I have asked nephrology to consult on the patient.  Patient was placed on fluid restriction.  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  CT of the chest abdomen pelvis was performed which does show at this point an endometrial mass that is now spreading to her retroperitoneal lymph nodes as well as to her adrenal gland.  This is most likely a cancerous process although  we do not have tissue and I explained this to the patient as well as the family.  Renal biopsy was also requested which will need to be completed.  Nephrology at this point will continue following the patient.  After discussion with surgical gynecological oncology the patient will be transferred to [facility].  Patient and family at this point is agreeable to the transfer.  At this point we have obtained a bed as well as completed the necessary paperwork and patient will be transferred there this afternoon.    Therefore, she is discharged in good and stable condition to a critical access hospital.    The patient met 2-midnight criteria for an inpatient stay at the time of discharge.    Discharge Date  9/25/2022    FOLLOW UP ITEMS POST DISCHARGE  Please call Dr. Jed Ames's APN Meli Briceno upon arrival    DISCHARGE DIAGNOSES  Principal Problem:    Hyponatremia POA: Yes  Active Problems:    Uterine mass POA: Yes      Overview: Patient had any incidental finding for uterine mass measuring 4.8 x 3.7 x       4.3 cm found on renal US. No un-intentional weight loss, no night sweats       or fevers     Stage 4 chronic kidney disease (HCC) POA: Yes      Overview: Labs from 9/7/22      -Creatine 2.8      -GFR 18            Patient had an echo ruling out heart failure. Retroperionteal US did not       mention hydronephrosis.     Thrombocytopenia (HCC) POA: Yes    Diarrhea POA: Unknown    Bilateral leg swelling POA: Yes    Paroxysmal atrial fibrillation (HCC) POA: Yes      Overview: Patient had an episode of afib on telemetry. She had an echo that did not       report MR. She is on coreg. She not on blood thinners currently. She has       an appointment with cardiology on 9/20.    Vitamin D deficiency POA: Yes  Resolved Problems:    * No resolved hospital problems. *      FOLLOW UP  Future Appointments   Date Time Provider Department Center   10/18/2022 10:40 AM ABEL Nraanjo   11/15/2022  2:30 PM Anton  NINO Hernandez. [facility] [address]   12/20/2022  3:30 PM Howie Buckley M.D. [facility] None   1/13/2023  2:00 PM Amy Stevenson M.D. [facility] None     No follow-up provider specified.    MEDICATIONS ON DISCHARGE     Medication List        ASK your doctor about these medications        Instructions   apixaban 5mg Tabs  Commonly known as: Eliquis   Take 1 Tablet by mouth 2 times a day.  Dose: 5 mg     carvedilol 3.125 MG Tabs  Commonly known as: COREG   Take 1 Tablet by mouth 2 times a day with meals for 30 days.  Dose: 3.125 mg     sodium bicarbonate 325 MG Tabs   Take 1 Tablet by mouth 2 times a day for 90 days.  Dose: 325 mg     sulfamethoxazole-trimethoprim 800-160 MG tablet  Commonly known as: BACTRIM DS   Take 0.5 Tablets by mouth 2 times a day for 5 days.  Dose: 0.5 Tablet     traZODone 100 MG Tabs  Commonly known as: DESYREL   Take 1 Tablet by mouth every evening.  Dose: 100 mg     vitamin D 1000 UNIT Tabs  Commonly known as: VITAMIND D3   Take 1 Tablet by mouth every day.  Dose: 1,000 Units              Allergies  No Known Allergies    DIET  Orders Placed This Encounter   Procedures    Diet Order Diet: Renal; Fluid modifications: (optional): 1000 ml Fluid Restriction     Standing Status:   Standing     Number of Occurrences:   1     Order Specific Question:   Diet:     Answer:   Renal [8]     Order Specific Question:   Fluid modifications: (optional)     Answer:   1000 ml Fluid Restriction [7]       ACTIVITY  As tolerated.  Weight bearing as tolerated    CONSULTATIONS  Nephrology Dr. Najjar    PROCEDURES  None    LABORATORY  Lab Results   Component Value Date    SODIUM 119 (LL) 09/25/2022    SODIUM 119 (LL) 09/25/2022    POTASSIUM 5.3 09/25/2022    CHLORIDE 88 (L) 09/25/2022    CO2 18 (L) 09/25/2022    GLUCOSE 87 09/25/2022    BUN 54 (H) 09/25/2022    CREATININE 3.93 (H) 09/25/2022        Lab Results   Component Value Date    WBC 7.2 09/24/2022    HEMOGLOBIN 9.7 (L) 09/24/2022    HEMATOCRIT 27.6 (L) 09/24/2022     PLATELETCT 60 (L) 09/24/2022        Total time of the discharge process exceeds 39 minutes.

## 2022-09-25 NOTE — ASSESSMENT & PLAN NOTE
Nephrology following.  Unclear if hypovolemia versus SIADH.  Urine osmolality has been low.  On 1 L fluid per day fluid restriction.  Nephrology following.  On 80 mg IV Lasix daily

## 2022-09-25 NOTE — H&P
"Hospital Medicine History & Physical Note    Date of Service  9/25/2022    Primary Care Physician  José Tian D.O.    Consultants  oncology    Specialist Names: Ames    Code Status  Full Code    Chief Complaint  No chief complaint on file.      History of Presenting Illness  Aimee Mauro is a 76 y.o. female who presented 9/25/2022 with a previous history of stage IV chronic kidney disease, paroxysmal A. fib, anticoagulation on apixaban.  Patient was originally admitted to [facility] on September 18 after presenting for evaluation of peripheral edema.  At that time she was found to have sodium of 117, and this was new for her.  She was in the hospital until the following day when she left AMA as she had not been able to get any sleep.    Patient was seen as an outpatient for follow-up, and was again found to have low sodium and told to come back to the emergency room so she presented back to [facility] on September 23.  At this time she was noted to have worsening renal function with a creatinine that had gone from 2.7-3.5.  Sodium again was found to be low around 116-117.  She was again admitted and work-up was entertained including a CT of the abdomen pelvis.  This is demonstrated a large uterine mass, with lymphadenopathy highly concerning for metastatic process.  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  The etiology of her worsening renal function is as of yet unclear, as the CT did not demonstrate any hydronephrosis.  She is transferred to [facility] for evaluation by gynecologic and for renal biopsy.    On my examination the patient states that she feels \"okay\".  She denies any pain but does state that she feels generally weak.  The peripheral edema that she presented with, is present and unchanged.  She denies any exertional shortness of breath or " orthopnea.    Discussed treatment plan with  pt and her  at length    DW Gyne Onc team Meli VICKERS I discussed the plan of care with patient and family.    Review of Systems  Review of Systems   Constitutional:  Positive for malaise/fatigue. Negative for chills and fever.   HENT:  Negative for nosebleeds and sore throat.    Eyes:  Negative for blurred vision and double vision.   Respiratory:  Negative for cough and shortness of breath.    Cardiovascular:  Positive for leg swelling. Negative for chest pain and palpitations.   Gastrointestinal:  Negative for abdominal pain, diarrhea, nausea and vomiting.   Genitourinary:  Negative for dysuria and urgency.   Musculoskeletal:  Negative for back pain.   Skin:  Negative for rash.   Neurological:  Negative for dizziness, loss of consciousness and headaches.     Past Medical History   has a past medical history of CKD (chronic kidney disease), stage IV (HCC), Congestive heart failure (HCC), Insomnia disorder, and Stroke (HCC) (01/01/1978).    Surgical History   has a past surgical history that includes tubal coagulation laparoscopic bilateral.     Family History  family history includes Cancer in her mother.   Family history reviewed with patient. There is no family history that is pertinent to the chief complaint.     Social History   reports that she has never smoked. She has never used smokeless tobacco. She reports current alcohol use. She reports that she does not use drugs.    Allergies  No Known Allergies    Medications  Prior to Admission Medications   Prescriptions Last Dose Informant Patient Reported? Taking?   apixaban (ELIQUIS) 5mg Tab  Patient No No   Sig: Take 1 Tablet by mouth 2 times a day.   carvedilol (COREG) 3.125 MG Tab  Patient No No   Sig: Take 1 Tablet by mouth 2 times a day with meals for 30 days.   sodium bicarbonate 325 MG Tab  Patient No No   Sig: Take 1 Tablet by mouth 2 times a day for 90 days.   sulfamethoxazole-trimethoprim  (BACTRIM DS) 800-160 MG tablet   No No   Sig: Take 0.5 Tablets by mouth 2 times a day for 5 days.   traZODone (DESYREL) 100 MG Tab  Patient No No   Sig: Take 1 Tablet by mouth every evening.   vitamin D (VITAMIND D3) 1000 UNIT Tab   No No   Sig: Take 1 Tablet by mouth every day.      Facility-Administered Medications: None       Physical Exam  Temp:  [36.4 °C (97.5 °F)-36.7 °C (98.1 °F)] 36.6 °C (97.9 °F)  Pulse:  [71-85] 74  Resp:  [16-20] 16  BP: (114-150)/(49-77) 129/69  SpO2:  [95 %-97 %] 95 %  Blood Pressure : 129/69   Temperature: 36.6 °C (97.9 °F)   Pulse: 74   Respiration: 16   Pulse Oximetry: 95 %       Physical Exam  Constitutional:       General: She is not in acute distress.     Appearance: She is well-developed. She is obese. She is not diaphoretic.   HENT:      Head: Normocephalic and atraumatic.   Neck:      Vascular: No JVD.   Cardiovascular:      Rate and Rhythm: Normal rate and regular rhythm.      Heart sounds: No murmur heard.  Pulmonary:      Effort: Pulmonary effort is normal. No respiratory distress.      Breath sounds: No stridor. No wheezing or rales.   Abdominal:      General: There is no distension.      Palpations: Abdomen is soft. There is no mass.      Tenderness: There is no abdominal tenderness. There is no guarding or rebound.   Musculoskeletal:         General: No tenderness.      Right lower leg: Edema present.      Left lower leg: Edema present.      Comments: 3+ pitting edema in both extremities extends to the knee   Skin:     General: Skin is warm and dry.      Coloration: Skin is pale.      Findings: No rash.   Neurological:      General: No focal deficit present.      Mental Status: She is alert and oriented to person, place, and time.   Psychiatric:         Mood and Affect: Mood normal.         Behavior: Behavior normal.         Thought Content: Thought content normal.       Laboratory:  Recent Labs     09/23/22  1614 09/24/22  0517   WBC 11.6* 7.2   RBC 4.16* 3.34*    HEMOGLOBIN 12.2 9.7*   HEMATOCRIT 33.6* 27.6*   MCV 80.8* 82.6   MCH 29.3 29.0   MCHC 36.3* 35.1*   RDW 40.0 41.5   PLATELETCT 87* 60*   MPV 10.8 10.6     Recent Labs     09/23/22  1614 09/23/22  2230 09/24/22  0517 09/24/22  1101 09/24/22  2131 09/25/22  0240 09/25/22  0808   SODIUM 117*   < > 119*   < > 118* 118* 119*  119*   POTASSIUM 5.1  --  5.0  --   --   --  5.3   CHLORIDE 87*  --  90*  --   --   --  88*   CO2 17*  --  18*  --   --   --  18*   GLUCOSE 103*  --  84  --   --   --  87   BUN 50*  --  52*  --   --   --  54*   CREATININE 3.53*  --  3.70*  --   --   --  3.93*   CALCIUM 8.0*  --  7.3*  --   --   --  7.6*    < > = values in this interval not displayed.     Recent Labs     09/23/22  1614 09/24/22  0517 09/25/22  0808   ALTSGPT 15  --   --    ASTSGOT 23  --   --    ALKPHOSPHAT 91  --   --    TBILIRUBIN 0.2  --   --    GLUCOSE 103* 84 87         Recent Labs     09/23/22  1614   NTPROBNP 4806*         Recent Labs     09/23/22  1614   TROPONINT 36*       Imaging:  IR-RENAL BX LEFT    (Results Pending)       X-Ray:  I have personally reviewed the images and compared with prior images.    Assessment/Plan:  Justification for Admission Status  I anticipate this patient will require at least two midnights for appropriate medical management, necessitating inpatient admission because patient has acute on chronic kidney disease, new uterine mass, and new diagnosis of SIADH and paraneoplastic syndrome.  She is medically quite complex and will require greater than 48 hours in the hospital for appropriate evaluation and initiation of treatment    Patient will need a Med/Surg bed on ONCOLOGY service .  The need is secondary to new uterine mass.    * Uterine mass- (present on admission)  Assessment & Plan  Large uterine mass highly suspicious for neoplastic process  Gyne Kia has been consulted and we look forward to their evaluation  Any forthcoming procedures may be limited in the short-term by the apixaban dose  which she did get a dose of this morning  I have discussed this today with Dr. Alvarez's APRN Meli Briceno and they are formulating the treatment plan  We will stop patient's apixaban today if she is not going to have any procedures tomorrow, we will temporize with heparin drip as she is high risk for DVT given her pelvic mass.      Thrombocytopenia (HCC)- (present on admission)  Assessment & Plan  Patient has been living chronically around 60-70 is going back into August of this year which is as far as we have labs  Continue to follow closely  Would stop all anticoagulation if she were to drop below 50  Will need transfusion if the oncology team chooses to resect her uterine mass  We will check a DIC panel, and LDH  There are no schistocytes noted on her peripheral smear    Hypertension- (present on admission)  Assessment & Plan  Continue Coreg 3.125 mg twice daily    Paroxysmal atrial fibrillation (HCC)- (present on admission)  Assessment & Plan  Rate control with carvedilol  Patient had been anticoagulated on apixaban however given forthcoming planned procedures, we will stop this.  We will let her ride today, and if no procedures are planned for tomorrow then we will temporize with a heparin drip given her renal function resume DOAC once procedures are completed    Hyponatremia- (present on admission)  Assessment & Plan  SIADH vs hypervolemic hyponatremia  DW Dr Ames who is very clear he does not think this is a paraneoplastic syndrome from the uterine mass  Nephrology is following  Follow daily BMP and urine output  Fluid restrict  Repeat UOsm and random Ruthy    Acute renal failure superimposed on stage 4 chronic kidney disease (HCC)- (present on admission)  Assessment & Plan  Baseline creatinine runs around 1.7  Patient's renal function has been worsening despite appropriate resuscitation  CT scan shows new urinary mass however no hydronephrosis  Nephrology has been consulted  Continue to follow BMP and urine output  closely  Planned renal biopsy however the patient did receive her last dose of apixaban this morning (9/25) therefore we will need to wait 48 hours before attempting biopsy  Renally dose medications as appropriate      VTE prophylaxis: therapeutic anticoagulation with apixaban

## 2022-09-25 NOTE — PROGRESS NOTES
Per MD, ask patient to hold off on water for the rest if the night. Patient agreed, fluid restriction changed to 1500mL.

## 2022-09-25 NOTE — ASSESSMENT & PLAN NOTE
Patient underwent hemodialysis today.  Creatinine is 1.9 today, baseline is about 1.7.  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 Dialysis was started 10/1/22

## 2022-09-25 NOTE — ASSESSMENT & PLAN NOTE
Unknown history or etiology of thrombocytopenia.  Platelets are stable since August 2022.  To stop anticoagulation if platelets drop below 50.

## 2022-09-25 NOTE — ASSESSMENT & PLAN NOTE
was 301. Patient underwent CT-guided right renal biopsy on 9/26/2022.  She underwent CT-guided retroperitoneal lymph node biopsy on 9/27/2022.  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  Treatments option included robotic hysterectomy, bilateral salpingo-oophorectomy, possible pelvic and periaortic lymph node dissection.  Patient has opted to not undergo any surgery.  She wishes to continue hemodialysis.

## 2022-09-25 NOTE — PROGRESS NOTES
Triage Officer note    Coming from [facility]  77yo New Dx uterine mass with metastatic disease  Na 115; paraneoplastic etiology  SAY: creat worsening    Ames consulted  Neph consulted

## 2022-09-25 NOTE — PROGRESS NOTES
Odalis from Lab called with critical result of Na 115 at 1851. Critical lab result read back to Odalis.     Dr. Rdz notified of critical lab result at 1855. Critical lab result read back by Dr. Rdz.

## 2022-09-25 NOTE — PROGRESS NOTES
Patient transferred to regional. tele box removed and returned to monitor tech. Patient states all questions and concerns have been addressed appropriately. Verbalized understanding of transfer.       Patient off the unit with [facility].

## 2022-09-26 NOTE — OR SURGEON
Immediate Post- Operative Note    PostOp Diagnosis: RENAL INSUFFICIENCY, PROTEINURIA      Procedure(s): CT GUIDED RIGHT RENAL BIOPSY    18G CORES BIOPINCE NEEDLE X 2    SUBMITTED TO ATTENDING PATHOLOGIST      Estimated Blood Loss: <5CC      FINDINGS: NEEDLE CONFIRMED AT LOWER POLE CORTEX RIGHT KIDNEY        Complications: NONE            9/26/2022     11:45 AM     Itz Mcleod M.D.

## 2022-09-26 NOTE — PROGRESS NOTES
Lab called with critical result of Sodium of 118 at 0958. Critical lab result read back to Lab.   This critical lab result is within parameters established by Dr. Quinones for this patient

## 2022-09-26 NOTE — DISCHARGE PLANNING
HTH/SCP TCN chart review completed. Noted patient with previous admission to [facility] 9/18-9/20/22 and 9/23-9/25/22, now admitted to [facility].     Collaborated with Marilynn prior to meeting with the pt. The most current review of medical record, knowledge of pt's PLOF and social support, LACE+ score of 7, 6 clicks scores of 20 ADL and 21 mobility were considered.      TCN met with patient with , Stephen, and daughter also present. Patient noted to be resting comfortably during TCN visit. Introduced TCN program. Provided education regarding post acute levels of care. Discussed HTH/SCP plan benefits (Meds to Beds, medical uber and [facility] transitional care). Patient family verbalized understanding.      Appreciate provider consults present for PT. Choice proactively discussed; however, family wished for additional consults and information from medical professionals prior to consideration of post acute resources. TCN will continue to follow and collaborate with discharge planning team as additional post acute needs arise. Thank you.     Completed today:  Choice obtained: none (see above)  [facility] referral not sent; [facility] discussed, family to further consider based on developing medical POC/GOC

## 2022-09-26 NOTE — THERAPY
PT Contact Note    PT Consult received/acknowledged. Per nsg, pt going for renal bx today. 6-clicks score of 21/24 and per nsg pt is mobilizing. Will defer PT eval until POC established to determine mobility needs.    Inga Salazar, PT, DPT  Ext. [phone]

## 2022-09-26 NOTE — PROGRESS NOTES
Lab called with critical result of sodium of 119 at 1635. Critical lab result read back to Lab .   This critical lab result is within parameters established by Dr. Quinones for this patient.

## 2022-09-26 NOTE — PROGRESS NOTES
Hospital Medicine Daily Progress Note    Date of Service  9/26/2022    Chief Complaint  Aimee Mauro is a 76 y.o. female admitted 9/25/2022 with hyponatremia.  She was seen at [facility] on 9/18/2022 with peripheral edema, and found to have sodium of 117.  She left A on 9/20/2022. She was sent back to [facility] on 9/23/2022 by her PCP due to low sodium and acute on chronic kidney disease.  Work-up showed a large uterine mass with lymphadenopathy, concerning for metastatic disease.  Nephrology were consulted for progressive renal failure. She was sent to [facility] for further work-up.  She has stage IV CKD, and paroxysmal atrial fibrillation (on Apixaban), hypertension, CVA, hearing deficit.  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  Consider renal biopsy.    Hospital Course  Dr. Ames was consulted.  Patient was evaluated by his APRN (Meli Briceno).  Patient will likely need endometrial biopsy.      Interval Problem Update  Nephrology were consulted for SAY and hyponatremia.  Patient underwent CT-guided renal biopsy today. Sodium is 119, creatinine is 3.96,     Addendum:   Discussed with Dr. Ames. Retroperitoneal lymph node biopsy has been ordered to rule out metastatic disease.     I have discussed this patient's plan of care and discharge plan at IDT rounds today with Case Management, Nursing, Nursing leadership, and other members of the IDT team.    Consultants/Specialty  Gyne/Onc, Nephrology    Code Status  Full Code    Disposition  Patient is not medically cleared for discharge.   Anticipate discharge to to home with close outpatient follow-up.  I have placed the appropriate orders for post-discharge needs.    Review of Systems  Review of Systems   Constitutional:  Positive for malaise/fatigue.   HENT: Negative.     Eyes: Negative.    Respiratory: Negative.     Cardiovascular: Negative.    Gastrointestinal: Negative.    Genitourinary: Negative.    Musculoskeletal: Negative.    Skin: Negative.    Neurological: Negative.     Endo/Heme/Allergies: Negative.    Psychiatric/Behavioral: Negative.        Physical Exam  Temp:  [36.1 °C (97 °F)-36.9 °C (98.5 °F)] 36.1 °C (97 °F)  Pulse:  [68-87] 77  Resp:  [12-16] 16  BP: ()/(47-80) 105/60  SpO2:  [94 %-99 %] 99 %    Physical Exam  Constitutional:       Appearance: She is ill-appearing.   HENT:      Head: Normocephalic.      Nose: Nose normal.      Mouth/Throat:      Mouth: Mucous membranes are moist.   Eyes:      Pupils: Pupils are equal, round, and reactive to light.   Cardiovascular:      Rate and Rhythm: Normal rate.   Pulmonary:      Effort: Pulmonary effort is normal.   Abdominal:      Palpations: Abdomen is soft.   Musculoskeletal:      Cervical back: Normal range of motion.   Skin:     General: Skin is warm.   Neurological:      Mental Status: She is oriented to person, place, and time.   Psychiatric:         Mood and Affect: Mood normal.       Fluids    Intake/Output Summary (Last 24 hours) at 9/26/2022 1342  Last data filed at 9/25/2022 1900  Gross per 24 hour   Intake 800 ml   Output --   Net 800 ml       Laboratory  Recent Labs     09/23/22  1614 09/24/22  0517 09/25/22  1701   WBC 11.6* 7.2 7.6   RBC 4.16* 3.34* 3.25*   HEMOGLOBIN 12.2 9.7* 9.6*   HEMATOCRIT 33.6* 27.6* 26.7*   MCV 80.8* 82.6 82.2   MCH 29.3 29.0 29.5   MCHC 36.3* 35.1* 36.0*   RDW 40.0 41.5 42.0   PLATELETCT 87* 60* 72*   MPV 10.8 10.6 9.9     Recent Labs     09/25/22  0808 09/25/22  1701 09/25/22  2107 09/26/22  0330 09/26/22  0910   SODIUM 119*  119*   < > 118* 119*  119* 118*   POTASSIUM 5.3  --   --  4.9 4.8   CHLORIDE 88*  --   --  89* 88*   CO2 18*  --   --  16* 20   GLUCOSE 87  --   --  80 88   BUN 54*  --   --  54* 55*   CREATININE 3.93*  --   --  3.96* 3.83*   CALCIUM 7.6*  --   --  7.1* 7.1*    < > = values in this interval not displayed.     Recent Labs     09/25/22  1701   INR 1.19*               Imaging  CT-NEEDLE BX-RENAL   Final Result      1.  CT GUIDED RIGHT RENAL BIOPSY.            Assessment/Plan  * Uterine mass- (present on admission)  Assessment & Plan  Uterine mass with retroperitoneal lymphadenopathy.   is elevated (301).  Per Gyne/Onc, patient will need a uterine biopsy.  Further recommendations per Dr. Ames's team    Acute renal failure superimposed on stage 4 chronic kidney disease (HCC)- (present on admission)  Assessment & Plan  Creatinine is 3.96 today.  Baseline is about 1.7.  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  Underwent CT-guided renal biopsy today.  Further recommendations per nephrology    Hyponatremia- (present on admission)  Assessment & Plan  Unclear if hypovolemia versus SIADH.  Urine osmolality is low.  On 1 L fluid per day fluid restriction.  Nephrology following.    Thrombocytopenia (HCC)- (present on admission)  Assessment & Plan  Unknown history or etiology of thrombocytopenia.  Platelets are stable since August 2022.  To stop anticoagulation if platelets drop below 50.    Hypertension- (present on admission)  Assessment & Plan  Stable.  Continue current    Paroxysmal atrial fibrillation (HCC)- (present on admission)  Assessment & Plan  On carvedilol.  Rate is controlled.  Hold anticoagulation until biopsies have been completed       VTE prophylaxis: therapeutic anticoagulation with heparin

## 2022-09-26 NOTE — PROGRESS NOTES
Pt presents to CT. Pt was consented by MD at bedside, confirmed by this RN and consent at bedside. Pt transferred to CT table in prone position. Patient underwent a Renal Biopsy by Dr. Mcleod. Procedure site was marked by MD and verified using imaging guidance. Pt placed on monitor, prepped and draped in a sterile fashion. Vitals were taken every 5 minutes and remained stable during procedure (see doc flow sheet for results). CO2 waveform capnography was monitored and remained WNL throughout procedure. Report called to Uma BROWN RN. Pt transported by florencia with RN to [id number]. Core Labs X sent to Patho hand delivered to lab.

## 2022-09-26 NOTE — CARE PLAN
The patient is Watcher - Medium risk of patient condition declining or worsening    Shift Goals  Clinical Goals: Safety    Progress made toward(s) clinical / shift goals:     Problem: Knowledge Deficit - Standard  Goal: Patient and family/care givers will demonstrate understanding of plan of care, disease process/condition, diagnostic tests and medications  Outcome: Progressing     Problem: Skin Integrity  Goal: Skin integrity is maintained or improved  Outcome: Progressing     Problem: Fall Risk  Goal: Patient will remain free from falls  Outcome: Progressing       Patient is not progressing towards the following goals:

## 2022-09-26 NOTE — PROGRESS NOTES
Kaushik from Lab called with critical result of sodium 118 at 1810. Critical lab result read back to Kaushik.   This critical lab result is within parameters established by  for this patient

## 2022-09-27 NOTE — PROGRESS NOTES
Nephrology Daily Progress Note    Date of Service  9/27/2022    Chief Complaint  76 y.o. female with no known medical history other than shortness of breath and lower extremity swelling that started in August 2022 with multiple admissions in August and September 2022 admitted 9/25/2022 with shortness of breath and hyponatremia.    The patient was admitted 3 times in September 2022, all with shortness of breath.  She was found to have normal cardiac function, but found to have elevated serum creatinine, and presumed to have CKD.  As part of her nephrology work-up, the patient was noted to have nephrotic range proteinuria, and a mildly elevated ASHKAN.  On imaging, the patient was found to have an endometrial mass, further imaging revealed retroperitoneal lymphadenopathy and possible adrenal mass.  The patient was transferred to [facility] on 9/25/2022 for gynecology oncology consultation and consideration of endometrial biopsy.      Interval Problem Update  9/26 -on my evaluation today, the patient complains of ongoing leg swelling, shortness of breath, and poor appetite.  She denies headache, nausea, vomiting.  The patient says she is leaning toward comfort focused measures, especially if she is diagnosed with cancer.  9/27 -patient complains of ongoing weakness and shortness of breath.  She says she is not bothered much by her lower extremity edema.  She denies chest pain, headache, nausea, vomiting, but still has a poor appetite.  She continues to say that she would not want any aggressive treatment if she is diagnosed with metastatic cancer.    Review of Systems  Review of Systems   Constitutional:  Positive for malaise/fatigue. Negative for fever.   Respiratory:  Negative for shortness of breath.    Cardiovascular:  Positive for leg swelling. Negative for chest pain.   Gastrointestinal:  Negative for abdominal pain.   All other systems reviewed and are negative.     Physical Exam  Temp:  [36.1 °C (97 °F)-37 °C  (98.6 °F)] 36.7 °C (98 °F)  Pulse:  [65-83] 81  Resp:  [11-16] 15  BP: ()/(46-69) 115/57  SpO2:  [93 %-100 %] 96 %    Physical Exam  Constitutional:       General: She is not in acute distress.     Appearance: She is well-developed. She is ill-appearing.   HENT:      Head: Normocephalic and atraumatic.      Mouth/Throat:      Mouth: Mucous membranes are moist.      Pharynx: Oropharynx is clear. No oropharyngeal exudate.   Eyes:      General: No scleral icterus.     Extraocular Movements: Extraocular movements intact.   Neck:      Trachea: No tracheal deviation.   Cardiovascular:      Rate and Rhythm: Normal rate and regular rhythm.      Heart sounds: Normal heart sounds. No murmur heard.  Pulmonary:      Effort: Pulmonary effort is normal.      Breath sounds: No stridor. No rales.   Abdominal:      Palpations: Abdomen is soft.      Tenderness: There is no abdominal tenderness.   Musculoskeletal:         General: Normal range of motion.      Cervical back: Normal range of motion. No rigidity.      Right lower leg: Edema (2+) present.      Left lower leg: Edema (2+) present.   Skin:     General: Skin is warm and dry.      Coloration: Skin is pale.   Neurological:      General: No focal deficit present.      Mental Status: She is alert and oriented to person, place, and time.   Psychiatric:         Mood and Affect: Mood normal.         Behavior: Behavior normal.       Fluids    Intake/Output Summary (Last 24 hours) at 9/27/2022 1304  Last data filed at 9/27/2022 0500  Gross per 24 hour   Intake --   Output 100 ml   Net -100 ml         Laboratory  Labs reviewed, pertinent labs below.  Recent Labs     09/25/22  1701   WBC 7.6   RBC 3.25*   HEMOGLOBIN 9.6*   HEMATOCRIT 26.7*   MCV 82.2   MCH 29.5   MCHC 36.0*   RDW 42.0   PLATELETCT 72*   MPV 9.9       Recent Labs     09/26/22  0330 09/26/22  0910 09/26/22  1535 09/26/22  2110 09/27/22  0339   SODIUM 119*  119* 118* 119* 120* 121*  121*   POTASSIUM 4.9 4.8  --    --  4.9   CHLORIDE 89* 88*  --   --  92*   CO2 16* 20  --   --  17*   GLUCOSE 80 88  --   --  83   BUN 54* 55*  --   --  58*   CREATININE 3.96* 3.83*  --   --  4.08*   CALCIUM 7.1* 7.1*  --   --  7.0*       Recent Labs     09/25/22  1701   INR 1.19*             URINALYSIS:  Lab Results   Component Value Date/Time    COLORURINE Yellow 09/25/2022 1439    CLARITY Clear 09/25/2022 1439    SPECGRAVITY 1.025 09/25/2022 1439    PHURINE 6.5 09/25/2022 1439    KETONES Negative 09/25/2022 1439    PROTEINURIN >=300 (A) 09/25/2022 1439    BILIRUBINUR Negative 09/25/2022 1439    UROBILU 0.2 09/25/2022 1439    NITRITE Negative 09/25/2022 1439    LEUKESTERAS Trace (A) 09/25/2022 1439    OCCULTBLOOD Large (A) 09/25/2022 1439     UPC  Lab Results   Component Value Date/Time    TOTPROTUR >600.0 (H) 09/24/2022 1509      Lab Results   Component Value Date/Time    CREATININEU 124.83 09/25/2022 1439         Imaging interpreted by radiologist. Imaging reports reviewed with pertinent findings below  CT-NEEDLE BX-LYMPH NODE   Final Result      CT-guided retroperitoneal lymph node biopsy.         CT-NEEDLE BX-RENAL   Final Result      1.  CT GUIDED RIGHT RENAL BIOPSY.            Current Facility-Administered Medications   Medication Dose Route Frequency Provider Last Rate Last Admin    NS (BOLUS) infusion 500 mL  500 mL Intravenous Once PRN Tiago Sepulveda M.D.        fentaNYL (SUBLIMAZE) injection 12.5-50 mcg  12.5-50 mcg Intravenous PRN Tiago Sepulveda M.D.   50 mcg at 09/27/22 1047    midazolam (Versed) injection 0.5-2 mg  0.5-2 mg Intravenous PRN Tiago Sepulveda M.D.   0.5 mg at 09/27/22 1047    ondansetron (ZOFRAN) syringe/vial injection 4 mg  4 mg Intravenous Q6HRS PRN Tiago Sepulveda M.D.        sodium bicarbonate tablet 1,300 mg  1,300 mg Oral BID Anton Hernandez M.D.   1,300 mg at 09/26/22 1752    acetaminophen (Tylenol) tablet 650 mg  650 mg Oral Q6HRS PRN Pauline Torres M.D.        carvedilol (COREG) tablet 3.125 mg  3.125  mg Oral BID WITH MEALS Pauline Torres M.D.   3.125 mg at 09/26/22 0752    ondansetron (ZOFRAN ODT) dispertab 4 mg  4 mg Oral Q4HRS PRN Pauline Torres M.D.   4 mg at 09/25/22 1841    ondansetron (ZOFRAN) syringe/vial injection 4 mg  4 mg Intravenous Q4HRS PRN Pauline Torres M.D.        traZODone (DESYREL) tablet 100 mg  100 mg Oral Nightly Pauline Torres M.D.   100 mg at 09/26/22 2054    vitamin D3 (cholecalciferol) tablet 1,000 Units  1,000 Units Oral DAILY Pauline Torres M.D.   1,000 Units at 09/26/22 0613     Kidney biopsy done 9/26/22 - I spoke with Dr. Ramos at [facility], preliminary results as below.  Membranous nephropathy pattern  Mild chronicity (10% IFTA)  Immunostaining for primary membranous pending  There is segmental vascular congestion which raises possibility of underlying renal vein thrombosis      Assessment/Plan  76 y.o. female with no known medical history other than shortness of breath and lower extremity swelling that started in August 2022 with multiple admissions in August and September 2022, large uterine mass with retroperitoneal lymphadenopathy admitted 9/25/2022 with shortness of breath and hyponatremia.    1.  Acute kidney injury.  Chronicity of her kidney disease is unknown prior to August 2022, therefore I cannot label her as having CKD.  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  There is no emergency need for dialysis.  Treatment of her nephropathy would involve treating the underlying cancer if it is proven to be secondary.  Further staining is still pending.  The patient has mentioned that she would not want aggressive dialysis if she is diagnosed with cancer.  Avoid nephrotoxins.  Check labs daily.     2.  Hyponatremia, I suspect this is multifactorial, from not eating much food, drinking too much water, and excess ADH from abdominal mass and cancer.  I recommend 3 times daily boost or Ensure  supplements.  I recommend low-salt diet.  Do not order salt tabs.  Check labs once a day.      3.  Hypertension, controlled, with blood pressure occasionally on the lower side.  Her lower extremity edema is due to hypoalbuminemia from nephrotic range proteinuria, also possibly from lymphatic blockage from retroperitoneal lymphadenopathy.  As she is comfortable from a respiratory standpoint, I do not believe there is an emergency need for Lasix, but if the patient would feel better with some diuresis, I would recommend Lasix 80 to 100 mg IV daily or twice daily, whichever dose is adequate enough to provide brisk diuresis within 1 hour.    4.  Metabolic acidosis, persistent.  Due to advanced kidney disease.  Continue sodium bicarbonate 1300 mg p.o. twice daily.  Check labs daily.      5.  Normocytic to microcytic anemia, persistent.  Given recent biopsies, I recommend checking CBC every day to monitor hemoglobin.    6.  Thrombocytopenia, persistent as of labs 9/25/2022.  Unclear etiology.  Anton TS 13 level checked on 9/27/2022 morning, pending.  I have a low suspicion for TTP.  Check CBC daily.    7.  Goals of care.  The patient continues to say that she would not want aggressive therapies if she is diagnosed with cancer.  I recommend malignancy work-up for now, with ongoing conversations about overall goals of care.      Discussed with Joey Hernandez MD  [facility]       Addendum:  Final pathology result of kidney biopsy has shown that immunostaining for primary membranous is negative.  This likely represents secondary membranous nephropathy from underlying condition.  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

## 2022-09-27 NOTE — PROGRESS NOTES
Nephrology Daily Progress Note    Date of Service  9/26/2022    Chief Complaint  76 y.o. female with no known medical history other than shortness of breath and lower extremity swelling that started in August 2022 with multiple admissions in August and September 2022 admitted 9/25/2022 with shortness of breath and hyponatremia.    The patient was admitted 3 times in September 2022, all with shortness of breath.  She was found to have normal cardiac function, but found to have elevated serum creatinine, and presumed to have CKD.  As part of her nephrology work-up, the patient was noted to have nephrotic range proteinuria, and a mildly elevated ASHKAN.  On imaging, the patient was found to have an endometrial mass, further imaging revealed retroperitoneal lymphadenopathy and possible adrenal mass.  The patient was transferred to [facility] on 9/25/2022 for gynecology oncology consultation and consideration of endometrial biopsy.      Interval Problem Update  9/26 -on my evaluation today, the patient complains of ongoing leg swelling, shortness of breath, and poor appetite.  She denies headache, nausea, vomiting.  The patient says she is leaning toward comfort focused measures, especially if she is diagnosed with cancer.    Review of Systems  Review of Systems   Constitutional:  Positive for malaise/fatigue. Negative for fever.   Respiratory:  Negative for shortness of breath.    Cardiovascular:  Positive for leg swelling. Negative for chest pain.   Gastrointestinal:  Negative for abdominal pain.   All other systems reviewed and are negative.     Physical Exam  Temp:  [36.1 °C (97 °F)-36.9 °C (98.5 °F)] 36.1 °C (97 °F)  Pulse:  [65-87] 65  Resp:  [12-16] 16  BP: ()/(47-80) 108/62  SpO2:  [93 %-99 %] 93 %    Physical Exam  Constitutional:       General: She is not in acute distress.     Appearance: She is well-developed. She is ill-appearing.   HENT:      Head: Normocephalic and atraumatic.      Mouth/Throat:       Mouth: Mucous membranes are moist.      Pharynx: Oropharynx is clear. No oropharyngeal exudate.   Eyes:      General: No scleral icterus.     Extraocular Movements: Extraocular movements intact.   Neck:      Trachea: No tracheal deviation.   Cardiovascular:      Rate and Rhythm: Normal rate and regular rhythm.      Heart sounds: Normal heart sounds. No murmur heard.  Pulmonary:      Effort: Pulmonary effort is normal.      Breath sounds: No stridor. No rales.   Abdominal:      Palpations: Abdomen is soft.      Tenderness: There is no abdominal tenderness.   Musculoskeletal:         General: Normal range of motion.      Cervical back: Normal range of motion. No rigidity.      Right lower leg: Edema (2+) present.      Left lower leg: Edema (2+) present.   Skin:     General: Skin is warm and dry.   Neurological:      General: No focal deficit present.      Mental Status: She is alert and oriented to person, place, and time.   Psychiatric:         Mood and Affect: Mood normal.         Behavior: Behavior normal.       Fluids    Intake/Output Summary (Last 24 hours) at 9/26/2022 1727  Last data filed at 9/25/2022 1900  Gross per 24 hour   Intake 800 ml   Output --   Net 800 ml       Laboratory  Labs reviewed, pertinent labs below.  Recent Labs     09/24/22  0517 09/25/22  1701   WBC 7.2 7.6   RBC 3.34* 3.25*   HEMOGLOBIN 9.7* 9.6*   HEMATOCRIT 27.6* 26.7*   MCV 82.6 82.2   MCH 29.0 29.5   MCHC 35.1* 36.0*   RDW 41.5 42.0   PLATELETCT 60* 72*   MPV 10.6 9.9     Recent Labs     09/25/22  0808 09/25/22  1701 09/26/22  0330 09/26/22  0910 09/26/22  1535   SODIUM 119*  119*   < > 119*  119* 118* 119*   POTASSIUM 5.3  --  4.9 4.8  --    CHLORIDE 88*  --  89* 88*  --    CO2 18*  --  16* 20  --    GLUCOSE 87  --  80 88  --    BUN 54*  --  54* 55*  --    CREATININE 3.93*  --  3.96* 3.83*  --    CALCIUM 7.6*  --  7.1* 7.1*  --     < > = values in this interval not displayed.     Recent Labs     09/25/22  1701   INR 1.19*            URINALYSIS:  Lab Results   Component Value Date/Time    COLORURINE Yellow 09/25/2022 1439    CLARITY Clear 09/25/2022 1439    SPECGRAVITY 1.025 09/25/2022 1439    PHURINE 6.5 09/25/2022 1439    KETONES Negative 09/25/2022 1439    PROTEINURIN >=300 (A) 09/25/2022 1439    BILIRUBINUR Negative 09/25/2022 1439    UROBILU 0.2 09/25/2022 1439    NITRITE Negative 09/25/2022 1439    LEUKESTERAS Trace (A) 09/25/2022 1439    OCCULTBLOOD Large (A) 09/25/2022 1439     UP  Lab Results   Component Value Date/Time    TOTPROTUR >600.0 (H) 09/24/2022 1509      Lab Results   Component Value Date/Time    CREATININEU 124.83 09/25/2022 1439         Imaging interpreted by radiologist. Imaging reports reviewed with pertinent findings below  CT-NEEDLE BX-RENAL   Final Result      1.  CT GUIDED RIGHT RENAL BIOPSY.            Current Facility-Administered Medications   Medication Dose Route Frequency Provider Last Rate Last Admin    LIDOCAINE HCL (PF) 1 % INJ SOLN             acetaminophen (Tylenol) tablet 650 mg  650 mg Oral Q6HRS PRN Pauline Torres M.D.        carvedilol (COREG) tablet 3.125 mg  3.125 mg Oral BID WITH MEALS Pauline Torres M.D.   3.125 mg at 09/26/22 0752    ondansetron (ZOFRAN ODT) dispertab 4 mg  4 mg Oral Q4HRS PRN Pauline Torres M.D.   4 mg at 09/25/22 1841    ondansetron (ZOFRAN) syringe/vial injection 4 mg  4 mg Intravenous Q4HRS PRN Pauline Torres M.D.        sodium bicarbonate tablet 325 mg  325 mg Oral BID Pauline Torres M.D.   325 mg at 09/26/22 0613    traZODone (DESYREL) tablet 100 mg  100 mg Oral Nightly Pauline Torres M.D.   100 mg at 09/25/22 2028    vitamin D3 (cholecalciferol) tablet 1,000 Units  1,000 Units Oral DAILY Pauline Torres M.D.   1,000 Units at 09/26/22 0613         Assessment/Plan  76 y.o. female with no known medical history other than shortness of breath and lower extremity swelling that started in August 2022 with multiple admissions in August and September 2022, large uterine mass with  retroperitoneal lymphadenopathy admitted 9/25/2022 with shortness of breath and hyponatremia.    1.  Acute kidney injury.  Chronicity of her kidney disease is unknown prior to August 2022, therefore I cannot label her as having CKD.  The etiology of her SAY is unclear, the patient does have nephrotic range proteinuria, no evidence of multiple myeloma so far on laboratory results.  I am concerned for secondary membranous nephropathy from metastatic cancer.  There is no emergency need for dialysis.  Given the patient's goals of care to not do any invasive treatments if she is diagnosed with cancer, I feel the diagnosis and work-up for cancer should take precedence.  However, patient did have a kidney biopsy done on 9/26/2022, results pending.  There is no emergency need for dialysis.  Avoid nephrotoxins.  Check labs daily.    2.  Hyponatremia, I suspect this is due to excess ADH from abdominal mass.  Restrict fluids to 1 L daily, with a preference for nutrient dense fluids such as boost or Ensure.  Low-salt diet.  Do not order salt tabs.  Check labs once daily.    3.  Hypertension, controlled, blood pressure actually on the low side.  Patient's lower extremity edema is likely due to her hypoalbuminemia from nephrotic range proteinuria, as well as obstructed lymphatic drainage given her retroperitoneal lymphadenopathy.  As the patient is comfortable from a respiratory standpoint, there is no acute need for diuretics, but if the patient does become hypoxic, I recommend Lasix 100 mg IV twice daily as needed for hypoxia.    4.  Metabolic acidosis, due to advanced kidney disease.  Order sodium bicarbonate 1300 mg p.o. twice daily.  Check labs daily.    5.  Normocytic to microcytic anemia, persistent, worsening.  Unclear etiology.  Recommend check CBC daily.    6.  Thrombocytopenia, persistent.  Unclear etiology.  Given SAY, anemia, thrombocytopenia, recommend check Anton TS 13 level.  I have low suspicion for TTP.    7.   Goals of care.  The patient has decided that she would not want any aggressive therapies if she is diagnosed with cancer.  Recommend malignancy work-up now take precedence.    Discussed with Dr. Quinones and Dr. Kwaku Hernandez MD  [facility]

## 2022-09-27 NOTE — PROGRESS NOTES
Hospital Medicine Daily Progress Note    Date of Service  9/27/2022    Chief Complaint  Aimee Mauro is a 76 y.o. female admitted 9/25/2022 with hyponatremia    Hospital Course  Aimee Mauro is a 76 y.o. female who presented 9/25/2022 with a previous history of stage IV chronic kidney disease, paroxysmal A. fib, anticoagulation on apixaban.  Patient was originally admitted to [facility] on September 18 after presenting for evaluation of peripheral edema.  At that time she was found to have sodium of 117, and this was new for her.  She was in the hospital until the following day when she left AMA as she had not been able to get any sleep.     Patient was seen as an outpatient for follow-up, and was again found to have low sodium and told to come back to the emergency room so she presented back to [facility] on September 23.  At this time she was noted to have worsening renal function with a creatinine that had gone from 2.7-3.5.  Sodium again was found to be low around 116-117.  She was again admitted and work-up was entertained including a CT of the abdomen pelvis.  This is demonstrated a large uterine mass, with lymphadenopathy highly concerning for metastatic process.  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  The etiology of her worsening renal function is as of yet unclear, as the CT did not demonstrate any hydronephrosis.  She is transferred to [facility] for evaluation by gynecologic and for renal biopsy.    Dr. Ames was consulted and recommended IR directed biopsy of the lymph note to rule out metastatic disease, completed 9/26/22 and results are pending.    Interval Problem Update  9/27/2022: Patient seen and examined.  She complains of lateral lower extremity swelling and decreased appetite.    -Vital signs reviewed, afebrile, on 2L NC with SpO2 100%  -SR 70-80s  with rare PVCs  -Nephrology following, appreciate recommendations  -Sodium 121 today, continue fluid restriction to 1L daily  -Continue on sodium bicarb 1300 mg BID  -Follow biopsy results    Extensive discussion had with patient and her daughter regarding the plan of care.  Reiterated that Dr. Ames will need biopsy results before he can decide further course of action will take 48 hours from today the specimen was collected.  Patient's daughter would really like her mom to be on Lasix as she feels it makes her feel better.  Discussed with Dr. Gonzalez who agreed with 40 mg IV Lasix twice daily.  Encouraged patient to get out of bed for meals.    Discharge planning: We will await biopsy results to determine further plan of care.  Physical therapy did not see patient today or today due to biopsy.  Anticipate recommendations tomorrow.    I have discussed this patient's plan of care and discharge plan at IDT rounds today with Case Management, Nursing, Nursing leadership, and other members of the IDT team.    Consultants/Specialty  nephrology and oncological surgery    Code Status  Full Code    Disposition  Patient is not medically cleared for discharge.   Anticipate discharge to to home with close outpatient follow-up.  I have placed the appropriate orders for post-discharge needs.    Review of Systems  Review of Systems   Constitutional:  Positive for malaise/fatigue. Negative for chills and fever.   HENT:  Positive for hearing loss. Negative for sore throat.    Eyes:  Negative for blurred vision and pain.   Respiratory:  Negative for cough and shortness of breath.    Cardiovascular:  Positive for leg swelling. Negative for chest pain and palpitations.   Gastrointestinal:  Negative for abdominal pain and heartburn.   Genitourinary:  Negative for dysuria.   Musculoskeletal:  Negative for back pain.   Neurological:  Positive for weakness. Negative for dizziness and headaches.   Psychiatric/Behavioral:  Negative for  depression.    All other systems reviewed and are negative.     Physical Exam  Temp:  [36.1 °C (97 °F)-37 °C (98.6 °F)] 37 °C (98.6 °F)  Pulse:  [65-83] 75  Resp:  [12-16] 16  BP: ()/(47-69) 116/57  SpO2:  [93 %-100 %] 100 %    Physical Exam  Constitutional:       Appearance: She is obese. She is ill-appearing.   HENT:      Head: Normocephalic and atraumatic.      Right Ear: External ear normal.      Left Ear: External ear normal.      Nose: Nose normal.      Mouth/Throat:      Mouth: Mucous membranes are moist.      Pharynx: Oropharynx is clear.   Eyes:      General: No scleral icterus.  Cardiovascular:      Rate and Rhythm: Normal rate and regular rhythm.      Pulses: Normal pulses.      Heart sounds: Normal heart sounds. No murmur heard.  Pulmonary:      Effort: Pulmonary effort is normal. No respiratory distress.      Breath sounds: Normal breath sounds.   Abdominal:      General: Abdomen is flat. Bowel sounds are normal.      Palpations: Abdomen is soft.      Tenderness: There is no abdominal tenderness.   Musculoskeletal:      Cervical back: Normal range of motion.      Right lower leg: Edema present.      Left lower leg: Edema present.   Skin:     General: Skin is warm.      Capillary Refill: Capillary refill takes less than 2 seconds.   Neurological:      Mental Status: She is alert and oriented to person, place, and time.   Psychiatric:         Mood and Affect: Mood normal.       Fluids    Intake/Output Summary (Last 24 hours) at 9/27/2022 0750  Last data filed at 9/27/2022 0500  Gross per 24 hour   Intake --   Output 100 ml   Net -100 ml       Laboratory  Recent Labs     09/25/22  1701   WBC 7.6   RBC 3.25*   HEMOGLOBIN 9.6*   HEMATOCRIT 26.7*   MCV 82.2   MCH 29.5   MCHC 36.0*   RDW 42.0   PLATELETCT 72*   MPV 9.9     Recent Labs     09/26/22  0330 09/26/22  0910 09/26/22  1535 09/26/22  2110 09/27/22  0339   SODIUM 119*  119* 118* 119* 120* 121*  121*   POTASSIUM 4.9 4.8  --   --  4.9   CHLORIDE  89* 88*  --   --  92*   CO2 16* 20  --   --  17*   GLUCOSE 80 88  --   --  83   BUN 54* 55*  --   --  58*   CREATININE 3.96* 3.83*  --   --  4.08*   CALCIUM 7.1* 7.1*  --   --  7.0*     Recent Labs     09/25/22  1701   INR 1.19*               Imaging  CT-NEEDLE BX-LYMPH NODE   Final Result      CT-guided retroperitoneal lymph node biopsy.         CT-NEEDLE BX-RENAL   Final Result      1.  CT GUIDED RIGHT RENAL BIOPSY.           Assessment/Plan  * Uterine mass- (present on admission)  Assessment & Plan  Uterine mass with retroperitoneal lymphadenopathy.   is elevated (301).  Per Gyne/Onc, patient will need a uterine biopsy.  Further recommendations per Dr. Ames's team    Thrombocytopenia (HCC)- (present on admission)  Assessment & Plan  Unknown history or etiology of thrombocytopenia.  Platelets are stable since August 2022.  To stop anticoagulation if platelets drop below 50. 72 today.    Hypertension- (present on admission)  Assessment & Plan  Stable.  Continue current medication regimen    Paroxysmal atrial fibrillation (HCC)- (present on admission)  Assessment & Plan  On carvedilol.  Rate is controlled.  Hold anticoagulation until biopsies have been completed, last biopsy 9/27 in the AM  Restart home dose of Eliquis tomorrow morning 24 hours post biopsy    Hyponatremia- (present on admission)  Assessment & Plan  Unclear if hypovolemia versus SIADH.  Urine osmolality is low.  On 1 L fluid per day fluid restriction and sodium bicarbonate tablets.  Nephrology following.  Started 40 mg IV Lasix twice daily today     Acute renal failure superimposed on stage 4 chronic kidney disease (HCC)- (present on admission)  Assessment & Plan  Creatinine is 4.08 today.  Baseline is about 1.7.  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  Underwent CT-guided renal biopsy 9/26 and lymph node biopsy today.  Further recommendations per nephrology    Bilateral leg swelling- (present on  admission)  Assessment & Plan  Discussed with Dr. Carlos evans to start Lasix 40 mg IV twice daily       VTE prophylaxis: therapeutic anticoagulation with Eliquis    I have performed a physical exam and reviewed and updated ROS and Plan today (9/27/2022). In review of yesterday's note (9/26/2022), there are no changes except as documented above.

## 2022-09-27 NOTE — OR SURGEON
Immediate Post- Operative Note        Findings: enlarged RP LN's      Procedure(s): CT RP LN biopsy      Estimated Blood Loss: Less than 5 ml        Complications: None            9/27/2022     11:03 AM     Tiago Sepulveda M.D.

## 2022-09-27 NOTE — HOSPITAL COURSE
Aimee Mauro is a 76 y.o. female who presented 9/25/2022 with a previous history of stage IV chronic kidney disease, paroxysmal A. fib, anticoagulation on apixaban.  Patient was originally admitted to [facility] on September 18 after presenting for evaluation of peripheral edema.  At that time she was found to have sodium of 117, and this was new for her.  She was in the hospital until the following day when she left AMA as she had not been able to get any sleep.     Patient was seen as an outpatient for follow-up, and was again found to have low sodium and told to come back to the emergency room so she presented back to [facility] on September 23.  At this time she was noted to have worsening renal function with a creatinine that had gone from 2.7-3.5.  Sodium again was found to be low around 116-117.  She was again admitted and work-up was entertained including a CT of the abdomen pelvis.  This is demonstrated a large uterine mass, with lymphadenopathy highly concerning for metastatic process.  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  The etiology of her worsening renal function is as of yet unclear, as the CT did not demonstrate any hydronephrosis.  She is transferred to [facility] for evaluation by gynecologic and for renal biopsy.    Dr. 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  During the hospitalization, patient's kidney function continued to deteriorate.  After extensive conversation with patient and her family, she is elected to pursue robotic hysterectomy with Dr. Ames and initiate dialysis.

## 2022-09-27 NOTE — PROGRESS NOTES
Pt back up to [id number] after lymph node biopsy. Dressing to posterior back CDI. Post procedure vital signs in place. Pt is on a . Low sodium diet with 1L fluid restriction started.

## 2022-09-27 NOTE — CARE PLAN
The patient is Watcher - Medium risk of patient condition declining or worsening    Shift Goals  Clinical Goals: Pt will receive lymph node biopsy  Patient Goals: Rest    Progress made toward(s) clinical / shift goals:        Problem: Knowledge Deficit - Standard  Goal: Patient and family/care givers will demonstrate understanding of plan of care, disease process/condition, diagnostic tests and medications  Outcome: Progressing  Note: Pt received lymph node biopsy this morning  Patient and family updated results take approximately 48 hrs     Problem: Mobility  Goal: Patient's capacity to carry out activities will improve  Note: Pt is ambulating to the bathroom and has been sitting in the chair this afternoon       Patient is not progressing towards the following goals:

## 2022-09-27 NOTE — CARE PLAN
The patient is Watcher - Medium risk of patient condition declining or worsening    Shift Goals  Clinical Goals: Safety  Patient Goals: Rest    Progress made toward(s) clinical / shift goals:      Problem: Skin Integrity  Goal: Skin integrity is maintained or improved  Outcome: Progressing     Problem: Fall Risk  Goal: Patient will remain free from falls  Outcome: Progressing     Problem: Pain - Standard  Goal: Alleviation of pain or a reduction in pain to the patient’s comfort goal  Outcome: Progressing     Patient is not progressing towards the following goals:

## 2022-09-27 NOTE — PROGRESS NOTES
Gyn Oncology followup.     Spoke with Dr. Hernandez and Dr. Quinones regarding pt's condition. I have reviewed the CT scan and pelvic U/S. She does have an intrauterine mass and also with postmenopausal bleeding she could have underlying uterine cancer. She is also noted to have retroperitoneal adenopathy which could represent metastatic disease. Pt has expressed that if she has metastatic disease she does not wish to proceed with treatment. Thus we need to proceed to prove that the retroperitoneal adenopathy does not have metastatic disease. For this reason, she should undergo IR directed bx of the lymph node to prove that she does not metastatic disease. If she does indeed have metastatic disease then there is no need to subject her to major surgery such as a hysterectomy. If lymph node bx is negative then I would perform a D&C and if there is endometrial cancer, then consideration can undergo a robotic hysterectomy. If she does require a robotic hysterectomy, she will need a medical clearance from her perspective of her renal status.     Overall her prognosis is guarded given her SAY Status and thrombocytopenia status.     We will await a renal bx results.

## 2022-09-27 NOTE — DISCHARGE PLANNING
TCN following. HTH/SCP chart review completed. Noted patient current POC/ GOC continues to develop, per chart review, patient current plan is to await results of renal bx in determining POC/ GOC. TCN will continue to monitor for potential discharge planning needs and collaborate with CM as needs arise. Thank you.     Previously completed:  Choice obtained: none  GSC referral not sent; GSC discussed, family to further consider post acute needs based on developing medical POC/GOC

## 2022-09-27 NOTE — PROGRESS NOTES
CT Nursing Note    Retroperitoneal Lymph Node Biopsy by MD Sepulveda assisted by RT Natalee, lower back access site.    X3 corse in Formalin, and X1 cores in RPMI collected by Dr. Sepulveda, specimen sent and delivered to lab by Natalee.    Lower back access site covered with gauze and tegaderm    Report given to KEVIN Wade; patient transported to [facility] via IR RN monitored then transferred care to report RN.

## 2022-09-28 PROBLEM — D64.9 NORMOCYTIC ANEMIA: Status: ACTIVE | Noted: 2022-01-01

## 2022-09-28 PROBLEM — E83.39 HYPERPHOSPHATEMIA: Status: ACTIVE | Noted: 2022-01-01

## 2022-09-28 NOTE — PROGRESS NOTES
Nephrology Daily Progress Note    Date of Service  9/28/2022    Chief Complaint  76 y.o. female with no known medical history other than shortness of breath and lower extremity swelling that started in August 2022 with multiple admissions in August and September 2022 admitted 9/25/2022 with shortness of breath and hyponatremia.    The patient was admitted 3 times in September 2022, all with shortness of breath.  She was found to have normal cardiac function, but found to have elevated serum creatinine, and presumed to have CKD.  As part of her nephrology work-up, the patient was noted to have nephrotic range proteinuria, and a mildly elevated ASHKAN.  On imaging, the patient was found to have an endometrial mass, further imaging revealed retroperitoneal lymphadenopathy and possible adrenal mass.  The patient was transferred to [facility] on 9/25/2022 for gynecology oncology consultation and consideration of endometrial biopsy.      Interval Problem Update  9/26 -on my evaluation today, the patient complains of ongoing leg swelling, shortness of breath, and poor appetite.  She denies headache, nausea, vomiting.  The patient says she is leaning toward comfort focused measures, especially if she is diagnosed with cancer.  9/27 -patient complains of ongoing weakness and shortness of breath.  She says she is not bothered much by her lower extremity edema.  She denies chest pain, headache, nausea, vomiting, but still has a poor appetite.  She continues to say that she would not want any aggressive treatment if she is diagnosed with metastatic cancer.  9/28-patient denies uremic symptoms, chest pain, shortness of breath.  Only 300 mL of urine output documented in the last 24 hours.    Review of Systems  Review of Systems   Constitutional:  Positive for malaise/fatigue. Negative for fever.   Respiratory:  Negative for shortness of breath.    Cardiovascular:  Positive for leg swelling. Negative for chest pain.    Gastrointestinal:  Negative for abdominal pain.   All other systems reviewed and are negative.     Physical Exam  Temp:  [36.6 °C (97.8 °F)-37.2 °C (98.9 °F)] 36.9 °C (98.5 °F)  Pulse:  [71-98] 71  Resp:  [16-17] 16  BP: (105-134)/(57-77) 106/63  SpO2:  [94 %-97 %] 95 %    Physical Exam  Constitutional:       General: She is not in acute distress.     Appearance: She is well-developed. She is ill-appearing.   HENT:      Head: Normocephalic and atraumatic.      Mouth/Throat:      Mouth: Mucous membranes are moist.      Pharynx: Oropharynx is clear. No oropharyngeal exudate.   Eyes:      General: No scleral icterus.     Extraocular Movements: Extraocular movements intact.   Neck:      Trachea: No tracheal deviation.   Cardiovascular:      Rate and Rhythm: Normal rate and regular rhythm.      Heart sounds: Normal heart sounds. No murmur heard.  Pulmonary:      Effort: Pulmonary effort is normal.      Breath sounds: No stridor. No rales.   Abdominal:      Palpations: Abdomen is soft.      Tenderness: There is no abdominal tenderness.   Musculoskeletal:         General: Normal range of motion.      Cervical back: Normal range of motion. No rigidity.      Right lower leg: Edema (2+) present.      Left lower leg: Edema (2+) present.   Skin:     General: Skin is warm and dry.   Neurological:      General: No focal deficit present.      Mental Status: She is alert and oriented to person, place, and time.   Psychiatric:         Mood and Affect: Mood normal.         Behavior: Behavior normal.       Fluids    Intake/Output Summary (Last 24 hours) at 9/28/2022 1604  Last data filed at 9/28/2022 1100  Gross per 24 hour   Intake --   Output 200.5 ml   Net -200.5 ml         Laboratory  Labs reviewed, pertinent labs below.  Recent Labs     09/25/22  1701 09/28/22  0348   WBC 7.6 5.3   RBC 3.25* 2.91*   HEMOGLOBIN 9.6* 8.5*   HEMATOCRIT 26.7* 24.2*   MCV 82.2 83.2   MCH 29.5 29.2   MCHC 36.0* 35.1*   RDW 42.0 43.4   PLATELETCT 72*  60*   MPV 9.9 9.9       Recent Labs     09/26/22  0910 09/26/22  1535 09/26/22  2110 09/27/22  0339 09/28/22  0348   SODIUM 118*   < > 120* 121*  121* 122*   POTASSIUM 4.8  --   --  4.9 5.3   CHLORIDE 88*  --   --  92* 95*   CO2 20  --   --  17* 20   GLUCOSE 88  --   --  83 83   BUN 55*  --   --  58* 57*   CREATININE 3.83*  --   --  4.08* 3.95*   CALCIUM 7.1*  --   --  7.0* 7.0*    < > = values in this interval not displayed.       Recent Labs     09/25/22  1701   INR 1.19*             URINALYSIS:  Lab Results   Component Value Date/Time    COLORURINE Yellow 09/25/2022 1439    CLARITY Clear 09/25/2022 1439    SPECGRAVITY 1.025 09/25/2022 1439    PHURINE 6.5 09/25/2022 1439    KETONES Negative 09/25/2022 1439    PROTEINURIN >=300 (A) 09/25/2022 1439    BILIRUBINUR Negative 09/25/2022 1439    UROBILU 0.2 09/25/2022 1439    NITRITE Negative 09/25/2022 1439    LEUKESTERAS Trace (A) 09/25/2022 1439    OCCULTBLOOD Large (A) 09/25/2022 1439     UPC  Lab Results   Component Value Date/Time    TOTPROTUR >600.0 (H) 09/24/2022 1509      Lab Results   Component Value Date/Time    CREATININEU 124.83 09/25/2022 1439         Imaging interpreted by radiologist. Imaging reports reviewed with pertinent findings below  CT-NEEDLE BX-LYMPH NODE   Final Result      CT-guided retroperitoneal lymph node biopsy.         CT-NEEDLE BX-RENAL   Final Result      1.  CT GUIDED RIGHT RENAL BIOPSY.            Current Facility-Administered Medications   Medication Dose Route Frequency Provider Last Rate Last Admin    furosemide (LASIX) injection 80 mg  80 mg Intravenous Q DAY Ashley Cook, A.P.R.N.   80 mg at 09/28/22 0912    calcium acetate (PHOS-LO) 667 MG tablet 667 mg  667 mg Oral TID AC Ashley Cook, A.P.R.N.        [START ON 9/29/2022] ferrous sulfate tablet 325 mg  325 mg Oral QDAY with Breakfast Ashley Cook A.P.R.N.        apixaban (ELIQUIS) tablet 5 mg  5 mg Oral BID Ashley Cook A.P.R.N.   5 mg at  09/28/22 0616    sodium bicarbonate tablet 1,300 mg  1,300 mg Oral BID Anton Hernandez M.D.   1,300 mg at 09/28/22 0616    acetaminophen (Tylenol) tablet 650 mg  650 mg Oral Q6HRS PRN Pauline Torres M.D.        carvedilol (COREG) tablet 3.125 mg  3.125 mg Oral BID WITH MEALS Pauline Torres M.D.   3.125 mg at 09/28/22 0912    ondansetron (ZOFRAN ODT) dispertab 4 mg  4 mg Oral Q4HRS PRN Pauline Torres M.D.   4 mg at 09/25/22 1841    ondansetron (ZOFRAN) syringe/vial injection 4 mg  4 mg Intravenous Q4HRS PRN Pauline Torres M.D.        traZODone (DESYREL) tablet 100 mg  100 mg Oral Nightly Pauline Torres M.D.   100 mg at 09/27/22 2031    vitamin D3 (cholecalciferol) tablet 1,000 Units  1,000 Units Oral DAILY Pauline Torres M.D.   1,000 Units at 09/28/22 0616     Kidney biopsy done 9/26/22 - I spoke with Dr. Ramos at [facility], results as below.  Diagnosis membranous glomerulopathy.  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  Mild chronicity (10% IFTA)  Immunostaining for primary membranous negative  There is segmental vascular congestion which raises possibility of underlying renal vein thrombosis      Assessment/Plan  76 y.o. female with no known medical history other than shortness of breath and lower extremity swelling that started in August 2022 with multiple admissions in August and September 2022, large uterine mass with retroperitoneal lymphadenopathy admitted 9/25/2022 with shortness of breath and hyponatremia.    1.  Acute kidney injury.  Oliguric, persistent.  The chronicity of her kidney disease is unknown prior to August 2022, therefore I cannot label her as having CKD.  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  There is no emergency need for dialysis, and if the patient were truly diagnosed with metastatic cancer, I believe she would  tolerate dialysis very poorly, and would recommend against it.  The patient appears in favor of no dialysis, but the patient's family wants her to keep her options open.  Avoid nephrotoxins.  Check labs daily.    2.  Hyponatremia, persistent.  Multifactorial from not eating enough food, drinking too much water, and excess ADH from abdominal mass and cancer.  I do not recommend salt tabs.  I recommend 3 times daily boost or Ensure supplements.  Low-salt diet.  Check labs once a day.      3.  Hypertension, controlled, with blood pressure actually on the low side occasionally.  Her lower extremity edema is likely due to hypoalbuminemia and possible lymphatic blockage from retroperitoneal lymphadenopathy.  Recommend Lasix 80 mg IV daily to help with symptomatic management of lower extremity edema.    4.  Metabolic acidosis, controlled, continue sodium bicarbonate 1300 mg p.o. twice daily.  Check labs daily.    5.  Normocytic to microcytic anemia, persistent, slightly worse.  Check CBC daily.    6.  Thrombocytopenia, persistent, mild.  Unclear etiology.  Anton TS 13 level checked on 9/27/2022, results pending.  I have a low suspicion for TTP, especially given no evidence of this on a kidney biopsy.  Check CBC daily.    7.  Goals of care.  Recommend ongoing discussions with the patient and the family.      Discussed with Joey Hernandez MD  [facility]

## 2022-09-28 NOTE — CARE PLAN
Problem: Knowledge Deficit - Standard  Goal: Patient and family/care givers will demonstrate understanding of plan of care, disease process/condition, diagnostic tests and medications  Outcome: Progressing     Problem: Skin Integrity  Goal: Skin integrity is maintained or improved  Outcome: Progressing     Problem: Fall Risk  Goal: Patient will remain free from falls  Outcome: Progressing     Problem: Pain - Standard  Goal: Alleviation of pain or a reduction in pain to the patient’s comfort goal  Outcome: Progressing   The patient is Stable - Low risk of patient condition declining or worsening    Shift Goals  Clinical Goals: rest (post radiation)  Patient Goals: rest    Progress made toward(s) clinical / shift goals: no pain at this time    Patient is not progressing towards the following goals:none

## 2022-09-28 NOTE — DISCHARGE PLANNING
Case Management Discharge Planning    Admission Date: 9/25/2022  GMLOS: 2.6  ALOS: 3    6-Clicks ADL Score: 20  6-Clicks Mobility Score: 21      Anticipated Discharge Dispo: Discharge Disposition: Discharged to home/self care (01)  Discharge Address: [address] [address] ( 2 story, 14 steps to second floor and 2 steps into the home)  Discharge Contact Phone Number: [phone], [phone]    DME Needed: No    Action(s) Taken: DC Assessment Complete (See below)    LSW met with pt and spouse at bedside. Pt alert and oriented. LSW reviewed the demographic information on the face sheet and completed a assessment. Pt and spouse are awaiting biopsy results prior to determining any dc needs at this time.     Escalations Completed: None    Medically Clear: No    Next Steps: LSW will follow for any additional dc needs.     Barriers to Discharge: Medical clearance    Is the patient up for discharge tomorrow: No    Care Transition Team Assessment    Information Source  Orientation Level: Oriented X4  Information Given By: Patient  Who is responsible for making decisions for patient? : Patient    Readmission Evaluation  Is this a readmission?: No    Elopement Risk  Legal Hold: No  Ambulatory or Self Mobile in Wheelchair: Yes  Disoriented: No  Psychiatric Symptoms: None  History of Wandering: No  Elopement this Admit: No  Vocalizing Wanting to Leave: No  Displays Behaviors, Body Language Wanting to Leave: No-Not at Risk for Elopement  Elopement Risk: Not at Risk for Elopement    Interdisciplinary Discharge Planning  Patient or legal guardian wants to designate a caregiver: No    Discharge Preparedness  What is your plan after discharge?: Home with help, Uncertain - pending medical team collaboration (pt awaiting biopsy results prior to dc planning.)  What are your discharge supports?: Child, Spouse  Prior Functional Level: Independent with Activities of Daily Living, Independent with Medication  "Management  Difficulity with ADLs: None  Difficulity with IADLs: None    Functional Assesment  Prior Functional Level: Independent with Activities of Daily Living, Independent with Medication Management    Finances  Financial Barriers to Discharge: No  Prescription Coverage: Yes    Vision / Hearing Impairment  Vision Impairment : No  Hearing Impairment : No (\"just slightly\")  Hearing Impairment: Both Ears, Patient Declines to Wear Hearing Device(s)      Advance Directive  Advance Directive?: None  Advance Directive offered?: AD Booklet given    Domestic Abuse  Have you ever been the victim of abuse or violence?: No  Physical Abuse or Sexual Abuse: No  Verbal Abuse or Emotional Abuse: No  Possible Abuse/Neglect Reported to:: Not Applicable    Psychological Assessment  History of Substance Abuse: None  History of Psychiatric Problems: No  Non-compliant with Treatment: No  Newly Diagnosed Illness: Yes    Discharge Risks or Barriers  Discharge risks or barriers?: Complex medical needs  Patient risk factors: Complex medical needs    Anticipated Discharge Information  Discharge Disposition: Discharged to home/self care (01)  Discharge Address: [address] [place] [id number] ( 2 story, 14 steps to second floor and 2 steps into the home)  Discharge Contact Phone Number: [phone], [phone]              "

## 2022-09-28 NOTE — ASSESSMENT & PLAN NOTE
Hgb stable 7-8. No signs of bleeding. Iron studies indicate anemia of chronic disease.  Continue oral iron supplementation

## 2022-09-28 NOTE — CARE PLAN
The patient is Stable - Low risk of patient condition declining or worsening    Shift Goals  Clinical Goals: safety , rest  Patient Goals: rest    Progress made toward(s) clinical / shift goals:  Pt calls appropriately, bed alarm on. Able to rest. Denies any pain or discomfort.   Problem: Skin Integrity  Goal: Skin integrity is maintained or improved  Outcome: Progressing     Problem: Fall Risk  Goal: Patient will remain free from falls  Outcome: Progressing     Problem: Mobility  Goal: Patient's capacity to carry out activities will improve  Outcome: Progressing

## 2022-09-28 NOTE — THERAPY
PT Contact Note:    PT consult received. Per EMR and discussion with RN, POC/COG pending biopsy results. RN reports pt has been ambulatory and appears to be mobilizing at PLOF baseline. Acute PT evaluation not warrated at this time. Please reorder PT to evaluate should pt experience an acute decline in function.    Sheridan Neville, PT, DPT    [id number]

## 2022-09-28 NOTE — PROGRESS NOTES
Received on her room awake, laying on her bed with  at bedside visiting POC updated. Denies any pain or discomfort. Call light within reach. Bed alarm on.

## 2022-09-28 NOTE — PROGRESS NOTES
Hospital Medicine Daily Progress Note    Date of Service  9/28/2022    Chief Complaint  Aimee Mauro is a 76 y.o. female admitted 9/25/2022 with hyponatremia    Hospital Course  Aimee Mauro is a 76 y.o. female who presented 9/25/2022 with a previous history of stage IV chronic kidney disease, paroxysmal A. fib, anticoagulation on apixaban.  Patient was originally admitted to [facility] on September 18 after presenting for evaluation of peripheral edema.  At that time she was found to have sodium of 117, and this was new for her.  She was in the hospital until the following day when she left AMA as she had not been able to get any sleep.     Patient was seen as an outpatient for follow-up, and was again found to have low sodium and told to come back to the emergency room so she presented back to [facility] on September 23.  At this time she was noted to have worsening renal function with a creatinine that had gone from 2.7-3.5.  Sodium again was found to be low around 116-117.  She was again admitted and work-up was entertained including a CT of the abdomen pelvis.  This is demonstrated a large uterine mass, with lymphadenopathy highly concerning for metastatic process.  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  The etiology of her worsening renal function is as of yet unclear, as the CT did not demonstrate any hydronephrosis.  She is transferred to [facility] for evaluation by gynecologic and for renal biopsy.    Dr. Ames was consulted and recommended IR directed biopsy of the lymph note to rule out metastatic disease, completed 9/26/22 and results are pending.    Interval Problem Update  9/28/2022: Patient seen and examined with her  at bedside.  He has no complaints and ate about 50% of her breakfast.  Reports that Dr. Ames was in this morning to discuss plan of  care.    -Vital signs reviewed, afebrile, on room air  -SR 70-90s with rare PACs  -Nephrology following, appreciate recommendations  -Lasix changed to 80 IV daily per nephrology recommendation  -Sodium 122 today, continue fluid restriction to 1L daily  -Creatinine 3.95  -Phos 5.7, started phoslo  -Hgb 8.5, platelets 60, started oral iron  -Corrected calcium 8.9   -Continue on sodium bicarb 1300 mg BID  -300 ml urine output documented overnight  -Kidney biopsy significant for membranous glomerulopathy  -Lymph node biopsy results pending  -OOB for meals and during the day    Discharge planning: We will await biopsy results to determine further plan of care.  Physical therapy did not see patient today or today due to biopsy.  Anticipate recommendations tomorrow.    I have discussed this patient's plan of care and discharge plan at IDT rounds today with Case Management, Nursing, Nursing leadership, and other members of the IDT team.    Consultants/Specialty  nephrology and oncological surgery    Code Status  Full Code    Disposition  Patient is not medically cleared for discharge.   Anticipate discharge to to home with close outpatient follow-up.  I have placed the appropriate orders for post-discharge needs.    Review of Systems  Review of Systems   Constitutional:  Positive for malaise/fatigue. Negative for chills and fever.   HENT:  Positive for hearing loss. Negative for sore throat.    Eyes:  Negative for blurred vision and pain.   Respiratory:  Negative for cough and shortness of breath.    Cardiovascular:  Positive for leg swelling. Negative for chest pain and palpitations.   Gastrointestinal:  Negative for abdominal pain and heartburn.   Genitourinary:  Negative for dysuria.   Musculoskeletal:  Negative for back pain.   Neurological:  Positive for weakness. Negative for dizziness and headaches.   Psychiatric/Behavioral:  Negative for depression.    All other systems reviewed and are negative.     Physical  Exam  Temp:  [36.4 °C (97.6 °F)-36.9 °C (98.4 °F)] 36.6 °C (97.9 °F)  Pulse:  [75-98] 84  Resp:  [11-17] 16  BP: ()/(46-77) 133/68  SpO2:  [94 %-98 %] 94 %    Physical Exam  Constitutional:       Appearance: She is obese. She is ill-appearing.   HENT:      Head: Normocephalic and atraumatic.      Right Ear: External ear normal.      Left Ear: External ear normal.      Nose: Nose normal.      Mouth/Throat:      Mouth: Mucous membranes are moist.      Pharynx: Oropharynx is clear.   Eyes:      General: No scleral icterus.  Cardiovascular:      Rate and Rhythm: Normal rate and regular rhythm.      Pulses: Normal pulses.      Heart sounds: Normal heart sounds. No murmur heard.  Pulmonary:      Effort: Pulmonary effort is normal. No respiratory distress.      Breath sounds: Normal breath sounds.   Abdominal:      General: Abdomen is flat. Bowel sounds are normal.      Palpations: Abdomen is soft.      Tenderness: There is no abdominal tenderness.   Musculoskeletal:      Cervical back: Normal range of motion.      Right lower leg: Edema present.      Left lower leg: Edema present.   Skin:     General: Skin is warm.      Capillary Refill: Capillary refill takes less than 2 seconds.   Neurological:      Mental Status: She is alert and oriented to person, place, and time.   Psychiatric:         Mood and Affect: Mood normal.       Fluids    Intake/Output Summary (Last 24 hours) at 9/28/2022 0738  Last data filed at 9/28/2022 0232  Gross per 24 hour   Intake --   Output 300 ml   Net -300 ml         Laboratory  Recent Labs     09/25/22  1701 09/28/22  0348   WBC 7.6 5.3   RBC 3.25* 2.91*   HEMOGLOBIN 9.6* 8.5*   HEMATOCRIT 26.7* 24.2*   MCV 82.2 83.2   MCH 29.5 29.2   MCHC 36.0* 35.1*   RDW 42.0 43.4   PLATELETCT 72* 60*   MPV 9.9 9.9       Recent Labs     09/26/22  0910 09/26/22  1535 09/26/22  2110 09/27/22  0339 09/28/22  0348   SODIUM 118*   < > 120* 121*  121* 122*   POTASSIUM 4.8  --   --  4.9 5.3   CHLORIDE 88*   --   --  92* 95*   CO2 20  --   --  17* 20   GLUCOSE 88  --   --  83 83   BUN 55*  --   --  58* 57*   CREATININE 3.83*  --   --  4.08* 3.95*   CALCIUM 7.1*  --   --  7.0* 7.0*    < > = values in this interval not displayed.       Recent Labs     09/25/22  1701   INR 1.19*                 Imaging  CT-NEEDLE BX-LYMPH NODE   Final Result      CT-guided retroperitoneal lymph node biopsy.         CT-NEEDLE BX-RENAL   Final Result      1.  CT GUIDED RIGHT RENAL BIOPSY.             Assessment/Plan  * Uterine mass- (present on admission)  Assessment & Plan  Uterine mass with retroperitoneal lymphadenopathy.   is elevated (301).  Per Gyne/Onc, patient will need a uterine biopsy.  Further recommendations per Dr. Ames's team    Hyperphosphatemia  Assessment & Plan  Phos 5.7 today  Started Phoslo  Nephrology following    Normocytic anemia  Assessment & Plan  Hgb drifted to 8.5 today  No signs of bleeding  Iron studies indicate anemia of chronic disease  Started oral iron supplementation    Thrombocytopenia (HCC)- (present on admission)  Assessment & Plan  Unknown history or etiology of thrombocytopenia.  Platelets are stable since August 2022.  To stop anticoagulation if platelets drop below 50.   60 today.    Hypertension- (present on admission)  Assessment & Plan  Stable.  Continue current medication regimen    Paroxysmal atrial fibrillation (HCC)- (present on admission)  Assessment & Plan  On carvedilol.  Rate is controlled.  Hold anticoagulation until biopsies have been completed, last biopsy 9/27 in the AM  Restarted home dose of Eliquis today 24 hours post biopsy    Hypocalcemia- (present on admission)  Assessment & Plan  Corrected calcium 8.9     Hyponatremia- (present on admission)  Assessment & Plan  Unclear if hypovolemia versus SIADH.  Urine osmolality is low.  On 1 L fluid per day fluid restriction and sodium bicarbonate tablets.  Nephrology following.  Started 80 mg IV Lasix daily today     Acute renal failure  superimposed on stage 4 chronic kidney disease (HCC)- (present on admission)  Assessment & Plan  Creatinine is 3.95.  Baseline is about 1.7.  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  Underwent CT-guided renal biopsy 9/26 and results demonstrate membranous glomerulopathy, lymph node biopsy pending    Bilateral leg swelling- (present on admission)  Assessment & Plan  Initially started with 40 mg IV Lasix twice daily  Upon nephrology recommendation, increased to 80 mg daily  Monitor urine output       VTE prophylaxis: therapeutic anticoagulation with Eliquis    I have performed a physical exam and reviewed and updated ROS and Plan today (9/28/2022). In review of yesterday's note (9/27/2022), there are no changes except as documented above.

## 2022-09-28 NOTE — DISCHARGE PLANNING
"TCN following. HTH/SCP chart review completed. Noted patient current POC/ GOC continues to develop and per chart review, patient current plan is to await results of renal bx in determining POC/ GOC. Note per review appears well documented that \"She continues to say that she would not want any aggressive treatment if she is diagnosed with metastatic cancer\". Note per review, she remains ambulatory with 6 clicks scores of 20/21. TCN will continue to monitor for potential discharge planning needs and collaborate with CM as needs arise. Thank you.      Previously completed:  Choice obtained: none  GSC referral not sent; GSC discussed, family to further consider post acute needs based on developing medical POC/GOC    "

## 2022-09-29 NOTE — DISCHARGE PLANNING
HTH/SCP TCN chart review completed. Collaborated with [facility] CMs.  No new TCN needs identified at this time. Would note GOC/POC still in development dependent on pt/family discussion and would refer to MD Ames note from 9/28 @11:38 for MD discussion with pt and family if detail re: current POC and possible interventions. Note per review, she remains ambulatory in room with 6 clicks scores of 20/21 and appears functionally capable of dc to home at this time if pt decides to not purse aggressive tx. TCN will continue to monitor for potential discharge planning needs and collaborate with CM as needs arise. Thank you.      Previously completed:  Choice obtained: none  GSC referral not sent; GSC discussed, family to further consider post acute needs based on developing medical POC/GOC (possible hospice v tx/interventions/sx)

## 2022-09-29 NOTE — PROGRESS NOTES
Hospital Medicine Daily Progress Note    Date of Service  9/29/2022    Chief Complaint  Aimee Mauro is a 76 y.o. female admitted 9/25/2022 with hyponatremia    Hospital Course  Aimee Mauro is a 76 y.o. female who presented 9/25/2022 with a previous history of stage IV chronic kidney disease, paroxysmal A. fib, anticoagulation on apixaban.  Patient was originally admitted to [facility] on September 18 after presenting for evaluation of peripheral edema.  At that time she was found to have sodium of 117, and this was new for her.  She was in the hospital until the following day when she left AMA as she had not been able to get any sleep.     Patient was seen as an outpatient for follow-up, and was again found to have low sodium and told to come back to the emergency room so she presented back to [facility] on September 23.  At this time she was noted to have worsening renal function with a creatinine that had gone from 2.7-3.5.  Sodium again was found to be low around 116-117.  She was again admitted and work-up was entertained including a CT of the abdomen pelvis.  This is demonstrated a large uterine mass, with lymphadenopathy highly concerning for metastatic process.  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  The etiology of her worsening renal function is as of yet unclear, as the CT did not demonstrate any hydronephrosis.  She is transferred to [facility] for evaluation by gynecologic and for renal biopsy.    Dr. Ames was consulted and recommended IR directed biopsy of the lymph note to rule out metastatic disease, completed 9/26/22 and results are pending.    Interval Problem Update  9/29/2022: Patient seen and examined.  Denies pain, eating about 50% of her meals.    -Vital signs reviewed, afebrile, 2L NC overnight  -Urine output 400 ml overnight  -SR 70-90s with rare  PACs  -Nephrology and Dr. Ames following, appreciate recommendations  -On lasix 80 mg IV daily  -Sodium 124 today, continue fluid restriction to 1L daily  -Creatinine 3.98  -Phos 5.4, started phoslo yesterday  -Hgb 7.8, platelets 60, started oral iron, will hold Eliquis if platelets drop to less than 50  -Continue on sodium bicarb 1300 mg BID  -Kidney biopsy significant for membranous glomerulopathy  -Lymph node biopsy positive    Advance care planning: More than 60 minutes (start time 10:30 AM, end time 11:33 AM) spent in a care conference with Dr. Hernandez, Kimberly HANCOCK for Dr. Ames, Dr. Ames via telephone, Aimee, her daughter Sally, and her  Stephen.  Extensive discussion regarding biopsy results and alternate plans of care.  Patient and family requesting time to consider options.  Palliative care consult placed for assistance in further goals of care discussion.    16:15-further discussion with family, they would like to take patient home and pursue outpatient laparoscopic surgery with Dr. Ames next week.  Requires arrangement of outpatient laboratory monitoring of her kidney function, I will discuss with case management and Dr. Hernandez tomorrow. I addressed CODE STATUS with the family who will discuss with Aimee what her wishes are.     I have discussed this patient's plan of care and discharge plan at IDT rounds today with Case Management, Nursing, Nursing leadership, and other members of the IDT team.    Consultants/Specialty  nephrology and oncological surgery    Code Status  Full Code    Disposition  Patient is not medically cleared for discharge.   Anticipate discharge to to home with close outpatient follow-up.  I have placed the appropriate orders for post-discharge needs.    Review of Systems  Review of Systems   Constitutional:  Positive for malaise/fatigue. Negative for chills and fever.   HENT:  Positive for hearing loss. Negative for sore throat.    Eyes:  Negative for blurred vision and pain.    Respiratory:  Negative for cough and shortness of breath.    Cardiovascular:  Positive for leg swelling. Negative for chest pain and palpitations.   Gastrointestinal:  Negative for abdominal pain and heartburn.   Genitourinary:  Negative for dysuria.   Musculoskeletal:  Negative for back pain.   Neurological:  Positive for weakness. Negative for dizziness and headaches.   Psychiatric/Behavioral:  Negative for depression.    All other systems reviewed and are negative.     Physical Exam  Temp:  [36.2 °C (97.1 °F)-37.3 °C (99.1 °F)] 36.6 °C (97.8 °F)  Pulse:  [70-83] 79  Resp:  [16] 16  BP: (104-120)/(59-68) 113/67  SpO2:  [95 %-100 %] 100 %    Physical Exam  Constitutional:       Appearance: She is obese. She is ill-appearing.   HENT:      Head: Normocephalic and atraumatic.      Right Ear: External ear normal.      Left Ear: External ear normal.      Nose: Nose normal.      Mouth/Throat:      Mouth: Mucous membranes are moist.      Pharynx: Oropharynx is clear.   Eyes:      General: No scleral icterus.  Cardiovascular:      Rate and Rhythm: Normal rate and regular rhythm.      Pulses: Normal pulses.      Heart sounds: Normal heart sounds. No murmur heard.  Pulmonary:      Effort: Pulmonary effort is normal. No respiratory distress.      Breath sounds: Normal breath sounds.   Abdominal:      General: Abdomen is flat. Bowel sounds are normal.      Palpations: Abdomen is soft.      Tenderness: There is no abdominal tenderness.   Musculoskeletal:      Cervical back: Normal range of motion.      Right lower leg: Edema present.      Left lower leg: Edema present.   Skin:     General: Skin is warm.      Capillary Refill: Capillary refill takes less than 2 seconds.   Neurological:      Mental Status: She is alert and oriented to person, place, and time.   Psychiatric:         Mood and Affect: Mood normal.       Fluids    Intake/Output Summary (Last 24 hours) at 9/29/2022 8332  Last data filed at 9/28/2022 1900  Gross per  24 hour   Intake --   Output 400.5 ml   Net -400.5 ml         Laboratory  Recent Labs     09/28/22 0348 09/29/22  0133   WBC 5.3 5.4   RBC 2.91* 2.69*   HEMOGLOBIN 8.5* 7.8*   HEMATOCRIT 24.2* 22.2*   MCV 83.2 82.5   MCH 29.2 29.0   MCHC 35.1* 35.1*   RDW 43.4 42.4   PLATELETCT 60* 60*   MPV 9.9 10.3       Recent Labs     09/27/22  0339 09/28/22 0348 09/29/22  0133   SODIUM 121*  121* 122* 124*   POTASSIUM 4.9 5.3 5.1   CHLORIDE 92* 95* 95*   CO2 17* 20 20   GLUCOSE 83 83 94   BUN 58* 57* 64*   CREATININE 4.08* 3.95* 3.98*   CALCIUM 7.0* 7.0* 7.0*                       Imaging  CT-NEEDLE BX-LYMPH NODE   Final Result      CT-guided retroperitoneal lymph node biopsy.         CT-NEEDLE BX-RENAL   Final Result      1.  CT GUIDED RIGHT RENAL BIOPSY.             Assessment/Plan  * Uterine mass- (present on admission)  Assessment & Plan  Uterine mass with retroperitoneal lymphadenopathy.   is elevated (301).  Per Gyne/Onc, patient will need a uterine biopsy.  Further recommendations per Dr. Ames's team    Hyperphosphatemia  Assessment & Plan  Phos 5.7 today  Started Phoslo  Nephrology following    Normocytic anemia  Assessment & Plan  Hgb drifted to 7.8 today  No signs of bleeding  Iron studies indicate anemia of chronic disease  Started oral iron supplementation    Thrombocytopenia (HCC)- (present on admission)  Assessment & Plan  Unknown history or etiology of thrombocytopenia.  Platelets are stable since August 2022.  To stop anticoagulation if platelets drop below 50.   60 today.    Hypertension- (present on admission)  Assessment & Plan  Stable.  Continue current medication regimen    Paroxysmal atrial fibrillation (HCC)- (present on admission)  Assessment & Plan  On carvedilol.  Rate is controlled.  Hold anticoagulation until biopsies have been completed, last biopsy 9/27 in the AM  Restarted home dose of Eliquis today 24 hours post biopsy    Hypocalcemia- (present on admission)  Assessment & Plan  Corrected  calcium 8.9     Hyponatremia- (present on admission)  Assessment & Plan  Unclear if hypovolemia versus SIADH.  Urine osmolality is low.  On 1 L fluid per day fluid restriction and sodium bicarbonate tablets.  Nephrology following.  Started 80 mg IV Lasix daily today     Acute renal failure superimposed on stage 4 chronic kidney disease (HCC)- (present on admission)  Assessment & Plan  Creatinine is 3.95.  Baseline is about 1.7.  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 In review of yesterday's note (9/28/2022), there are no changes except as documented above.

## 2022-09-29 NOTE — CARE PLAN
The patient is Watcher - Medium risk of patient condition declining or worsening    Shift Goals  Clinical Goals: safety  Patient Goals: rest    Progress made toward(s) clinical / shift goals:      Problem: Knowledge Deficit - Standard  Goal: Patient and family/care givers will demonstrate understanding of plan of care, disease process/condition, diagnostic tests and medications  Outcome: Progressing     Problem: Skin Integrity  Goal: Skin integrity is maintained or improved  Outcome: Progressing     Problem: Fall Risk  Goal: Patient will remain free from falls  Outcome: Progressing     Problem: Pain - Standard  Goal: Alleviation of pain or a reduction in pain to the patient’s comfort goal  Outcome: Progressing     Patient is Aox4, up SB with steady gait, able to turn self in bed from side to side. Pt calls for assistance appropriately, denies pain at this time.

## 2022-09-29 NOTE — PROGRESS NOTES
Nephrology Daily Progress Note    Date of Service  9/29/2022    Chief Complaint  76 y.o. female with no known medical history other than shortness of breath and lower extremity swelling that started in August 2022 with multiple admissions in August and September 2022 admitted 9/25/2022 with shortness of breath and hyponatremia.    The patient was admitted 3 times in September 2022, all with shortness of breath.  She was found to have normal cardiac function, but found to have elevated serum creatinine, and presumed to have CKD.  As part of her nephrology work-up, the patient was noted to have nephrotic range proteinuria, and a mildly elevated ASHKAN.  On imaging, the patient was found to have an endometrial mass, further imaging revealed retroperitoneal lymphadenopathy and possible adrenal mass.  The patient was transferred to [facility] on 9/25/2022 for gynecology oncology consultation and consideration of endometrial biopsy.      Interval Problem Update  9/26 -on my evaluation today, the patient complains of ongoing leg swelling, shortness of breath, and poor appetite.  She denies headache, nausea, vomiting.  The patient says she is leaning toward comfort focused measures, especially if she is diagnosed with cancer.  9/27 -patient complains of ongoing weakness and shortness of breath.  She says she is not bothered much by her lower extremity edema.  She denies chest pain, headache, nausea, vomiting, but still has a poor appetite.  She continues to say that she would not want any aggressive treatment if she is diagnosed with metastatic cancer.  9/28-patient denies uremic symptoms, chest pain, shortness of breath.  Only 300 mL of urine output documented in the last 24 hours.  9/29-3 urine voids recorded in the last 24 hours, amount not recorded.  Patient thinks her appetite is a little bit better.  Denies chest pain, shortness of breath, but does still feel fatigued and weak.  I had a very long conversation,  together with primary hospitalist nurse practitioner, and Dr. Ames with the patient and the patient's family about options moving forward.  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  The patient was presented with options of laparoscopy, hysterectomy, dialysis, chemotherapy, radiation, and different combinations of those options pending further progress and evaluations.  The patient was also presented with the option of hospice.    Review of Systems  Review of Systems   Constitutional:  Positive for malaise/fatigue. Negative for fever.   Respiratory:  Negative for shortness of breath.    Cardiovascular:  Positive for leg swelling. Negative for chest pain.   Gastrointestinal:  Negative for abdominal pain.   All other systems reviewed and are negative.     Physical Exam  Temp:  [36.2 °C (97.1 °F)-37.6 °C (99.7 °F)] 37.6 °C (99.7 °F)  Pulse:  [70-83] 83  Resp:  [16] 16  BP: (104-121)/(59-67) 121/63  SpO2:  [96 %-100 %] 96 %    Physical Exam  Constitutional:       General: She is not in acute distress.     Appearance: She is well-developed. She is ill-appearing.   HENT:      Head: Normocephalic and atraumatic.      Mouth/Throat:      Mouth: Mucous membranes are moist.      Pharynx: Oropharynx is clear. No oropharyngeal exudate.   Eyes:      General: No scleral icterus.     Extraocular Movements: Extraocular movements intact.   Neck:      Trachea: No tracheal deviation.   Cardiovascular:      Rate and Rhythm: Normal rate and regular rhythm.      Heart sounds: Normal heart sounds. No murmur heard.  Pulmonary:      Effort: Pulmonary effort is normal.      Breath sounds: No stridor. No rales.   Abdominal:      Palpations: Abdomen is soft.      Tenderness: There is no abdominal tenderness.   Musculoskeletal:         General: Normal range of motion.      Cervical back: Normal range of motion. No rigidity.      Right lower leg: Edema (2+) present.      Left lower leg: Edema (2+)  present.   Skin:     General: Skin is warm and dry.   Neurological:      General: No focal deficit present.      Mental Status: She is alert and oriented to person, place, and time.   Psychiatric:         Mood and Affect: Mood normal.         Behavior: Behavior normal.       Fluids    Intake/Output Summary (Last 24 hours) at 9/29/2022 1609  Last data filed at 9/29/2022 1451  Gross per 24 hour   Intake 480 ml   Output 350 ml   Net 130 ml         Laboratory  Labs reviewed, pertinent labs below.  Recent Labs     09/28/22  0348 09/29/22 0133   WBC 5.3 5.4   RBC 2.91* 2.69*   HEMOGLOBIN 8.5* 7.8*   HEMATOCRIT 24.2* 22.2*   MCV 83.2 82.5   MCH 29.2 29.0   MCHC 35.1* 35.1*   RDW 43.4 42.4   PLATELETCT 60* 60*   MPV 9.9 10.3       Recent Labs     09/27/22  0339 09/28/22 0348 09/29/22 0133   SODIUM 121*  121* 122* 124*   POTASSIUM 4.9 5.3 5.1   CHLORIDE 92* 95* 95*   CO2 17* 20 20   GLUCOSE 83 83 94   BUN 58* 57* 64*   CREATININE 4.08* 3.95* 3.98*   CALCIUM 7.0* 7.0* 7.0*                   URINALYSIS:  Lab Results   Component Value Date/Time    COLORURINE Yellow 09/25/2022 1439    CLARITY Clear 09/25/2022 1439    SPECGRAVITY 1.025 09/25/2022 1439    PHURINE 6.5 09/25/2022 1439    KETONES Negative 09/25/2022 1439    PROTEINURIN >=300 (A) 09/25/2022 1439    BILIRUBINUR Negative 09/25/2022 1439    UROBILU 0.2 09/25/2022 1439    NITRITE Negative 09/25/2022 1439    LEUKESTERAS Trace (A) 09/25/2022 1439    OCCULTBLOOD Large (A) 09/25/2022 1439     UPC  Lab Results   Component Value Date/Time    TOTPROTUR >600.0 (H) 09/24/2022 1509      Lab Results   Component Value Date/Time    CREATININEU 124.83 09/25/2022 1439         Imaging interpreted by radiologist. Imaging reports reviewed with pertinent findings below  CT-NEEDLE BX-LYMPH NODE   Final Result      CT-guided retroperitoneal lymph node biopsy.         CT-NEEDLE BX-RENAL   Final Result      1.  CT GUIDED RIGHT RENAL BIOPSY.            Current Facility-Administered  Medications   Medication Dose Route Frequency Provider Last Rate Last Admin    furosemide (LASIX) injection 80 mg  80 mg Intravenous Q DAY CROW Chong.P.R.N.   80 mg at 09/29/22 0519    calcium acetate (PHOS-LO) 667 MG tablet 667 mg  667 mg Oral TID AC CROW Chong.P.R.N.   667 mg at 09/29/22 0827    ferrous sulfate tablet 325 mg  325 mg Oral QDAY with Breakfast CROW Chong.P.R.N.   325 mg at 09/29/22 0827    apixaban (ELIQUIS) tablet 5 mg  5 mg Oral BID CROW Chong.P.R.N.   5 mg at 09/29/22 0519    sodium bicarbonate tablet 1,300 mg  1,300 mg Oral BID Anton Hernandez M.D.   1,300 mg at 09/29/22 0519    acetaminophen (Tylenol) tablet 650 mg  650 mg Oral Q6HRS PRN Pauline Torres M.D.        carvedilol (COREG) tablet 3.125 mg  3.125 mg Oral BID WITH MEALS Pauline Torres M.D.   3.125 mg at 09/29/22 0827    ondansetron (ZOFRAN ODT) dispertab 4 mg  4 mg Oral Q4HRS PRN Pauline Torres M.D.   4 mg at 09/25/22 1841    ondansetron (ZOFRAN) syringe/vial injection 4 mg  4 mg Intravenous Q4HRS PRN Pauline Torres M.D.        traZODone (DESYREL) tablet 100 mg  100 mg Oral Nightly Pauline Torres M.D.   100 mg at 09/28/22 2019    vitamin D3 (cholecalciferol) tablet 1,000 Units  1,000 Units Oral DAILY Pauline Torres M.D.   1,000 Units at 09/29/22 0519     Kidney biopsy done 9/26/22 - I spoke with Dr. Ramos at [facility], results as below.  Diagnosis membranous glomerulopathy.  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  Mild chronicity (10% IFTA)  Immunostaining for primary membranous negative  There is segmental vascular congestion which raises possibility of underlying renal vein thrombosis      Assessment/Plan  76 y.o. female with no known medical history other than shortness of breath and lower extremity swelling that started in August 2022 with multiple  admissions in August and September 2022, large uterine mass with retroperitoneal lymphadenopathy admitted 9/25/2022 with shortness of breath and hyponatremia.    1.  Acute kidney injury.  Oliguric, persistent.  The chronicity of her kidney disease is unknown prior to August 2022, therefore I cannot label her as having CKD.  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  There is no emergent need for dialysis, but the patient is very close to this need.  She does have biopsy confirmed metastatic cancer.  We presented her and her family with the options of laparoscopy to check for peritoneal carcinomatosis, hysterectomy, possible dialysis perioperatively if kidneys continue to fail.  Patient and family will need to discuss their goals of care.  As the patient's kidney function remains marginal, without dialysis, and with ongoing SAY, I believe her prognosis is that she would die within weeks.  If the patient is started on dialysis, and undergoes treatment for cancer, she has an indeterminate prognosis that is better discussed by gynecology oncology.  Continue to avoid nephrotoxins.  Check labs daily.    2.  Hyponatremia, persistent.  Multifactorial from not eating enough food, drinking too much water, and excess ADH from metastatic cancer.  I do not recommend salt tabs.  I recommend 3 times daily boost or Ensure supplements.  Low-salt diet.  Check labs once a day.    3.  Hypertension, controlled, with occasional borderline low blood pressure.  I would recommend discontinue carvedilol.  As the patient is bothered by her lower extremity edema, recommend continue Lasix 80 mg IV daily.  On discharge, transition Lasix to 160 mg p.o. daily.    4.  Metabolic acidosis, controlled, continue sodium bicarbonate 1300 mg p.o. twice daily.  Check labs daily.    5.  Normocytic to microcytic anemia, worsening.  Check CBC daily.  As the patient has metastatic cancer, even if she does  start dialysis, she is not a candidate for Epogen.  Dr. Ames has offered hysterectomy as a way to control vaginal bleeding to help her anemia.    6.  Thrombocytopenia, persistent, mild.  Unclear etiology, but I suspect platelet consumption from mild ongoing vaginal bleeding.  Check CBC daily.    7.  Goals of care.  I recommend ongoing discussions with the patient and the family.    Discussed with Kimberly Mackey of gynecology oncology, Dr. Ames of gynecology oncology    Anton Hernandez MD  [facility]

## 2022-09-29 NOTE — PROGRESS NOTES
I met with the patient and her  this morning and have reviewed the current work-up that is ongoing.  When I met with the patient this morning the pathology report from retroperitoneal lymph node biopsy that was performed was not back yet.  I had a very detailed discussion with the patient and her  in regards to the goal of of treatment.  Patient did express that if she does have metastatic disease she does not want treatment.  I did inform her that I suspect that she may have underlying endometrial cancer.  Patient apparently gives a history of abnormal vaginal bleeding for 6 months.  She currently denies any symptoms with the exception of vaginal bleeding.  It is unclear how long she has had a kidney disease.  I would agree with Dr. Hernandez that her kidney disease cannot be classified as chronic kidney disease as she has not seen previously any physicians prior to her admission.  There is also apparent that her kidneys disease is not secondary to obstructive uropathy from her cancer.  I did note that Dr. Hernandez's  opinion is her kidney disease is secondary to metastatic disease.  Given the absence of obstructive uropathy, it would be exceedingly unusual for metastatic endometrial cancer resulting and a membranous glomerulonephritis.  I still believe that her kidney disease is a result of some other mechanism rather than the metastatic nature of her cancer.    I have informed the patient that given that she now has stage III C2 endometrial cancer based on positive periaortic lymph nodes her prognosis is guarded.  She will need surgery to remove the uterus which would mitigate her vaginal bleeding and for her periaortic lymph nodes she will require adjuvant radiation therapy.  I am not sure that she will be a good candidate for chemotherapy.  With treatment I have informed the patient that if she does not deteriorate pulm or kidney function from the standpoint of her cancer, if she responds to adjuvant  radiation therapy for positive periaortic lymph nodes 5-year survival would be approximately 40%.  I also made it very clear to the patient that given her renal failure status a surgical intervention could potentially worsen her kidney status to the extent that she may require dialysis.    At this point in time but  and the family have expressed that they would like to think about this.  If she does choose to proceed with treatment we can tentatively schedule her surgery for next week.  I would like for the Dr. Hernandez to weigh in on how best to optimize patient's condition prior to to surgery if patient elects to proceed with surgery.    The alternative treatment of hospice was also discussed with the patient.    We will continue to follow along with you.    Thank you again for the opportunity for me to participate in her care you should have any questions or point we really help please not hesitate to call.

## 2022-09-29 NOTE — CARE PLAN
Problem: Knowledge Deficit - Standard  Goal: Patient and family/care givers will demonstrate understanding of plan of care, disease process/condition, diagnostic tests and medications  Outcome: Progressing     Problem: Skin Integrity  Goal: Skin integrity is maintained or improved  Outcome: Progressing     Problem: Fall Risk  Goal: Patient will remain free from falls  Outcome: Progressing     Problem: Pain - Standard  Goal: Alleviation of pain or a reduction in pain to the patient’s comfort goal  Outcome: Progressing   The patient is Stable - Low risk of patient condition declining or worsening    Shift Goals  Clinical Goals: safety  Patient Goals: rest    Progress made toward(s) clinical / shift goals: up with one assist.    Patient is not progressing towards the following goals:no

## 2022-09-30 NOTE — DISCHARGE PLANNING
[facility] TCN chart review completed. Collaborated with APRN, CM and MARIAN for dc planning. Per APRN, pt will need lab draws at home via HH every several days for preparation for outpatient surgery in the next week or so. Discussed with SW/JAYESH and team reached out to HH explaining dc planning and situation. Note that appears after discussion, [facility] will be able to accept at time of dc and perform lab draws as needed MWF. JAYESH obtained HH choice for continuation of care as well. (Note received update in PM that pt has agreed to HD at time of dc and may no longer need lab draws though in place for [facility] as noted above if needed for dc planning/lab draws). Would also note that pt remains ambulatory and 6 clicks of 21 as well and appears functionally capable of dc to home once medically cleared. TCN will continue to follow and collaborate with discharge planning team as additional post acute needs arise. Thank you.    Previously completed:    - Choice forms: HH -> [facility] has accepted if needed  - [facility] not sent given above circumstances (was initially to have kidney MD/outpatient office read labwork if needed and planning for outpatient follow up in next few weeks as well)

## 2022-09-30 NOTE — PROGRESS NOTES
Nephrology Daily Progress Note    Date of Service  9/30/2022    Chief Complaint  76 y.o. female with no known medical history other than shortness of breath and lower extremity swelling that started in August 2022 with multiple admissions in August and September 2022 admitted 9/25/2022 with shortness of breath and hyponatremia.    The patient was admitted 3 times in September 2022, all with shortness of breath.  She was found to have normal cardiac function, but found to have elevated serum creatinine, and presumed to have CKD.  As part of her nephrology work-up, the patient was noted to have nephrotic range proteinuria, and a mildly elevated ASHKAN.  On imaging, the patient was found to have an endometrial mass, further imaging revealed retroperitoneal lymphadenopathy and possible adrenal mass.  The patient was transferred to [facility] on 9/25/2022 for gynecology oncology consultation and consideration of endometrial biopsy.      Interval Problem Update  9/26 -on my evaluation today, the patient complains of ongoing leg swelling, shortness of breath, and poor appetite.  She denies headache, nausea, vomiting.  The patient says she is leaning toward comfort focused measures, especially if she is diagnosed with cancer.  9/27 -patient complains of ongoing weakness and shortness of breath.  She says she is not bothered much by her lower extremity edema.  She denies chest pain, headache, nausea, vomiting, but still has a poor appetite.  She continues to say that she would not want any aggressive treatment if she is diagnosed with metastatic cancer.  9/28-patient denies uremic symptoms, chest pain, shortness of breath.  Only 300 mL of urine output documented in the last 24 hours.  9/29-3 urine voids recorded in the last 24 hours, amount not recorded.  Patient thinks her appetite is a little bit better.  Denies chest pain, shortness of breath, but does still feel fatigued and weak.  I had a very long conversation,  together with primary hospitalist nurse practitioner, and Dr. Ames with the patient and the patient's family about options moving forward.  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  The patient was presented with options of laparoscopy, hysterectomy, dialysis, chemotherapy, radiation, and different combinations of those options pending further progress and evaluations.  The patient was also presented with the option of hospice.  9/30-urine output only 200 mL in the last 24 hours.  Patient denies chest pain, shortness of breath.  She discussed with her family, and wishes to move forward with surgery-robotic hysterectomy/surgical staging of endometrial cancer.  Surgery tentatively planned for next Thursday.  I discussed that with her marginal kidney function and ongoing oliguria, borderline hyperkalemia, I am not comfortable sending the patient home unless she starts dialysis.  The patient discussed with her family, and agrees to start dialysis.    Review of Systems  Review of Systems   Constitutional:  Positive for malaise/fatigue. Negative for fever.   Respiratory:  Negative for shortness of breath.    Cardiovascular:  Positive for leg swelling. Negative for chest pain.   Gastrointestinal:  Negative for abdominal pain.   All other systems reviewed and are negative.     Physical Exam  Temp:  [36.9 °C (98.4 °F)-37.7 °C (99.8 °F)] 36.9 °C (98.4 °F)  Pulse:  [82-87] 82  Resp:  [16-17] 17  BP: (107-126)/(57-69) 126/69  SpO2:  [92 %-95 %] 95 %    Physical Exam  Constitutional:       General: She is not in acute distress.     Appearance: She is well-developed.   HENT:      Head: Normocephalic and atraumatic.      Mouth/Throat:      Mouth: Mucous membranes are moist.      Pharynx: Oropharynx is clear. No oropharyngeal exudate.   Eyes:      General: No scleral icterus.     Extraocular Movements: Extraocular movements intact.   Neck:      Trachea: No tracheal deviation.    Cardiovascular:      Rate and Rhythm: Normal rate and regular rhythm.      Heart sounds: Normal heart sounds. No murmur heard.  Pulmonary:      Effort: Pulmonary effort is normal.      Breath sounds: No stridor. No rales.   Abdominal:      Palpations: Abdomen is soft.      Tenderness: There is no abdominal tenderness.   Musculoskeletal:         General: Normal range of motion.      Cervical back: Normal range of motion. No rigidity.      Right lower leg: Edema (2+) present.      Left lower leg: Edema (2+) present.   Skin:     General: Skin is warm and dry.   Neurological:      General: No focal deficit present.      Mental Status: She is alert and oriented to person, place, and time.   Psychiatric:         Mood and Affect: Mood normal.         Behavior: Behavior normal.       Fluids    Intake/Output Summary (Last 24 hours) at 9/30/2022 1417  Last data filed at 9/29/2022 1451  Gross per 24 hour   Intake 120 ml   Output 50 ml   Net 70 ml         Laboratory  Labs reviewed, pertinent labs below.  Recent Labs     09/28/22 0348 09/29/22  0133 09/30/22  0800   WBC 5.3 5.4 5.5   RBC 2.91* 2.69* 2.91*   HEMOGLOBIN 8.5* 7.8* 8.5*   HEMATOCRIT 24.2* 22.2* 24.2*   MCV 83.2 82.5 83.2   MCH 29.2 29.0 29.2   MCHC 35.1* 35.1* 35.1*   RDW 43.4 42.4 43.9   PLATELETCT 60* 60* 76*   MPV 9.9 10.3 10.2       Recent Labs     09/28/22  0348 09/29/22  0133 09/30/22  0800   SODIUM 122* 124* 125*   POTASSIUM 5.3 5.1 5.0   CHLORIDE 95* 95* 94*   CO2 20 20 22   GLUCOSE 83 94 97   BUN 57* 64* 64*   CREATININE 3.95* 3.98* 4.09*   CALCIUM 7.0* 7.0* 7.3*                   URINALYSIS:  Lab Results   Component Value Date/Time    COLORURINE Yellow 09/25/2022 1439    CLARITY Clear 09/25/2022 1439    SPECGRAVITY 1.025 09/25/2022 1439    PHURINE 6.5 09/25/2022 1439    KETONES Negative 09/25/2022 1439    PROTEINURIN >=300 (A) 09/25/2022 1439    BILIRUBINUR Negative 09/25/2022 1439    UROBILU 0.2 09/25/2022 1439    NITRITE Negative 09/25/2022 1439     LEUKESTERAS Trace (A) 09/25/2022 1439    OCCULTBLOOD Large (A) 09/25/2022 1439     [facility]  Lab Results   Component Value Date/Time    TOTPROTUR >600.0 (H) 09/24/2022 1509      Lab Results   Component Value Date/Time    CREATININEU 124.83 09/25/2022 1439         Imaging interpreted by radiologist. Imaging reports reviewed with pertinent findings below  CT-NEEDLE BX-LYMPH NODE   Final Result      CT-guided retroperitoneal lymph node biopsy.         CT-NEEDLE BX-RENAL   Final Result      1.  CT GUIDED RIGHT RENAL BIOPSY.      IR-NORA MELARA PLACEMENT >5    (Results Pending)         Current Facility-Administered Medications   Medication Dose Route Frequency Provider Last Rate Last Admin    NS (BOLUS) infusion 250 mL  250 mL Intravenous DIALYSIS PRN Anton Hernandez M.D.        furosemide (LASIX) injection 80 mg  80 mg Intravenous Q DAY Ashley Cook A.P.R.N.   80 mg at 09/30/22 0606    calcium acetate (PHOS-LO) 667 MG tablet 667 mg  667 mg Oral TID AC Ashley Cook A.P.R.N.   667 mg at 09/30/22 1204    ferrous sulfate tablet 325 mg  325 mg Oral QDAY with Breakfast Ashley Cook A.P.R.N.   325 mg at 09/30/22 0758    [Held by provider] apixaban (ELIQUIS) tablet 5 mg  5 mg Oral BID Ashley Cook A.P.R.N.   5 mg at 09/30/22 0606    sodium bicarbonate tablet 1,300 mg  1,300 mg Oral BID Anton Hernandez M.D.   1,300 mg at 09/30/22 0606    acetaminophen (Tylenol) tablet 650 mg  650 mg Oral Q6HRS PRN Pauline Torres M.D.        carvedilol (COREG) tablet 3.125 mg  3.125 mg Oral BID WITH MEALS Pauline Torres M.D.   3.125 mg at 09/30/22 0758    ondansetron (ZOFRAN ODT) dispertab 4 mg  4 mg Oral Q4HRS PRN Pauline Torres M.D.   4 mg at 09/25/22 1841    ondansetron (ZOFRAN) syringe/vial injection 4 mg  4 mg Intravenous Q4HRS PRN Pauline Torres M.D.        traZODone (DESYREL) tablet 100 mg  100 mg Oral Nightly Pauline Torres M.D.   100 mg at 09/28/22 2019    vitamin D3 (cholecalciferol) tablet 1,000 Units  1,000  Units Oral DAILY Pauline Torres M.D.   1,000 Units at 09/30/22 0606     Kidney biopsy done 9/26/22 - I spoke with Dr. Ramos at [facility], results as below.  Diagnosis membranous glomerulopathy.  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  Mild chronicity (10% IFTA)  Immunostaining for primary membranous negative  There is segmental vascular congestion which raises possibility of underlying renal vein thrombosis      Assessment/Plan  76 y.o. female with no known medical history other than shortness of breath and lower extremity swelling that started in August 2022 with multiple admissions in August and September 2022, large uterine mass with retroperitoneal lymphadenopathy admitted 9/25/2022 with shortness of breath and hyponatremia.    1.  Acute kidney injury.  Remains oliguric, SAY persists.  The chronicity of her kidney disease is unknown prior to August 2022, therefore I cannot label her as having CKD.  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  There is no emergency need for dialysis, but as she remains oliguric, with borderline high potassium, with plans for major surgery next week, I am not comfortable sending her home on outpatient observation, and would recommend starting dialysis.  The patient and family agree.  Plan on tunneled dialysis catheter placement, followed by dialysis initiation.  I discussed with the patient that she has the right and option to quit dialysis anytime in the future, either of her kidneys recover on their own, or if she decides she wants to go on hospice and die.  Continue to avoid nephrotoxins.  Check labs daily.    2.  Hyponatremia, persistent.  Multifactorial from not eating enough food, drinking too much water, and excess ADH from metastatic cancer.  Limit hypotonic fluids.  I do not recommend salt  tabs.  I recommend 3 times daily boost or Ensure supplements.  Check labs once a day.    3.  Hypertension, controlled.  Continue Lasix 80 mg IV daily to help manage lower extremity edema.  Once the patient is started on dialysis, we will plan on ultrafiltration with dialysis as tolerated.  Patient might need to stop taking carvedilol if her blood pressure gets too low with dialysis.  On discharge, I would switch Lasix to 160 mg p.o. daily to help maintain urine output between dialysis treatments.    4.  Metabolic acidosis, controlled.  Stop sodium bicarbonate tablets, as patient will soon start dialysis.  Check labs daily.    5.  Normocytic to microcytic anemia, persistent.  Concern for mild degree of blood loss anemia from endometrial cancer.  Check CBC daily.  As the patient does have metastatic cancer, she is not a candidate for Epogen.  Dr. Ames is offering hysterectomy as a way to control vaginal bleeding to help manage her anemia.    6.  Thrombocytopenia, persistent, mild.  Unclear etiology, but I suspect platelet consumption from mild ongoing endometrial cancer bleeding.  Check CBC daily.    7.  Goals of care.  I had a discussion with the patient and her  today about her goals of care.  The patient wishes to be DNR, but intubation okay.  Orders entered.  She wishes to continue management of her endometrial cancer and kidney failure for now, is not ready for comfort care.    Discussed with Joey Hernandez MD  [facility]

## 2022-09-30 NOTE — PROGRESS NOTES
Hospital Medicine Daily Progress Note    Date of Service  9/30/2022    Chief Complaint  Aimee Mauro is a 76 y.o. female admitted 9/25/2022 with hyponatremia    Hospital Course  Aimee Mauro is a 76 y.o. female who presented 9/25/2022 with a previous history of stage IV chronic kidney disease, paroxysmal A. fib, anticoagulation on apixaban.  Patient was originally admitted to [facility] on September 18 after presenting for evaluation of peripheral edema.  At that time she was found to have sodium of 117, and this was new for her.  She was in the hospital until the following day when she left AMA as she had not been able to get any sleep.     Patient was seen as an outpatient for follow-up, and was again found to have low sodium and told to come back to the emergency room so she presented back to [facility] on September 23.  At this time she was noted to have worsening renal function with a creatinine that had gone from 2.7-3.5.  Sodium again was found to be low around 116-117.  She was again admitted and work-up was entertained including a CT of the abdomen pelvis.  This is demonstrated a large uterine mass, with lymphadenopathy highly concerning for metastatic process.  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  The etiology of her worsening renal function is as of yet unclear, as the CT did not demonstrate any hydronephrosis.  She is transferred to [facility] for evaluation by gynecologic and for renal biopsy.    Dr. Ames was consulted and recommended IR directed biopsy of the lymph note to rule out metastatic disease, completed 9/26/22 and results are pending.    Interval Problem Update  9/30/2022: Patient seen and examined.  Denies pain, wants to get out of her room and go walk around she feels restless.    -Vital signs reviewed, afebrile  -Urine output 200 ml  overnight  -SR 70-90s with rare PACs  -Nephrology and Dr. Ames following, appreciate recommendations  -On lasix 80 mg IV daily  -Sodium 125 today, continue fluid restriction to 1L daily  -Creatinine 4.09, phos 5.1  -Hgb 8.5, platelets 76, started oral iron, will hold Eliquis if platelets drop to less than 50  -Continue on sodium bicarb 1300 mg BID  -Kidney biopsy significant for membranous glomerulopathy  -Lymph node biopsy positive  -Patient has elected to start dialysis and undergo surgery with Dr. Ames which is scheduled for Tuesday  -Will remain inpatient over the weekend for tunneled catheter placement, initiation of dialysis, and preop bowel prep  -Patient expressed that she wants to be DNR and her CODE STATUS has been updated accordingly    I have discussed this patient's plan of care and discharge plan at IDT rounds today with Case Management, Nursing, Nursing leadership, and other members of the IDT team.    Consultants/Specialty  nephrology and oncological surgery    Code Status  DNAR, I OK    Disposition  Patient is not medically cleared for discharge.   Anticipate discharge to to home with close outpatient follow-up.  I have placed the appropriate orders for post-discharge needs.    Review of Systems  Review of Systems   Constitutional:  Positive for malaise/fatigue. Negative for chills and fever.   HENT:  Positive for hearing loss. Negative for sore throat.    Eyes:  Negative for blurred vision and pain.   Respiratory:  Negative for cough and shortness of breath.    Cardiovascular:  Positive for leg swelling. Negative for chest pain and palpitations.   Gastrointestinal:  Negative for abdominal pain and heartburn.   Genitourinary:  Negative for dysuria.   Musculoskeletal:  Negative for back pain.   Neurological:  Positive for weakness. Negative for dizziness and headaches.   Psychiatric/Behavioral:  Negative for depression.    All other systems reviewed and are negative.     Physical Exam  Temp:  [36.9 °C  (98.4 °F)-37.7 °C (99.8 °F)] 36.9 °C (98.4 °F)  Pulse:  [82-87] 82  Resp:  [16-17] 17  BP: (107-126)/(57-69) 126/69  SpO2:  [92 %-95 %] 95 %    Physical Exam  Constitutional:       Appearance: She is obese. She is ill-appearing.   HENT:      Head: Normocephalic and atraumatic.      Right Ear: External ear normal.      Left Ear: External ear normal.      Nose: Nose normal.      Mouth/Throat:      Mouth: Mucous membranes are moist.      Pharynx: Oropharynx is clear.   Eyes:      General: No scleral icterus.  Cardiovascular:      Rate and Rhythm: Normal rate and regular rhythm.      Pulses: Normal pulses.      Heart sounds: Normal heart sounds. No murmur heard.  Pulmonary:      Effort: Pulmonary effort is normal. No respiratory distress.      Breath sounds: Normal breath sounds.   Abdominal:      General: Abdomen is flat. Bowel sounds are normal.      Palpations: Abdomen is soft.      Tenderness: There is no abdominal tenderness.   Musculoskeletal:      Cervical back: Normal range of motion.      Right lower leg: Edema present.      Left lower leg: Edema present.   Skin:     General: Skin is warm.      Capillary Refill: Capillary refill takes less than 2 seconds.   Neurological:      Mental Status: She is alert and oriented to person, place, and time.   Psychiatric:         Mood and Affect: Mood normal.       Fluids  No intake or output data in the 24 hours ending 09/30/22 1613      Laboratory  Recent Labs     09/28/22  0348 09/29/22  0133 09/30/22  0800   WBC 5.3 5.4 5.5   RBC 2.91* 2.69* 2.91*   HEMOGLOBIN 8.5* 7.8* 8.5*   HEMATOCRIT 24.2* 22.2* 24.2*   MCV 83.2 82.5 83.2   MCH 29.2 29.0 29.2   MCHC 35.1* 35.1* 35.1*   RDW 43.4 42.4 43.9   PLATELETCT 60* 60* 76*   MPV 9.9 10.3 10.2       Recent Labs     09/28/22  0348 09/29/22  0133 09/30/22  0800   SODIUM 122* 124* 125*   POTASSIUM 5.3 5.1 5.0   CHLORIDE 95* 95* 94*   CO2 20 20 22   GLUCOSE 83 94 97   BUN 57* 64* 64*   CREATININE 3.95* 3.98* 4.09*   CALCIUM 7.0*  7.0* 7.3*                       Imaging  CT-NEEDLE BX-LYMPH NODE   Final Result      CT-guided retroperitoneal lymph node biopsy.         CT-NEEDLE BX-RENAL   Final Result      1.  CT GUIDED RIGHT RENAL BIOPSY.      IR-MELARA,GROSHONG PLACEMENT >5    (Results Pending)          Assessment/Plan  * Uterine mass- (present on admission)  Assessment & Plan  Uterine mass with retroperitoneal lymphadenopathy.   is elevated (301).  Per Gyne/Onc, patient will need a uterine biopsy.  Further recommendations per Dr. Ames's team  Patient will undergo robotic surgery with Dr. Ames on Tuesday    Hyperphosphatemia  Assessment & Plan  Phos 5.1 today  Started Phoslo  Nephrology following    Normocytic anemia  Assessment & Plan  Hgb stable 7-8   no signs of bleeding  Iron studies indicate anemia of chronic disease  Started oral iron supplementation    Thrombocytopenia (HCC)- (present on admission)  Assessment & Plan  Unknown history or etiology of thrombocytopenia.  Platelets are stable since August 2022.  To stop anticoagulation if platelets drop below 50.   76 today.    Hypertension- (present on admission)  Assessment & Plan  Stable.  Continue current medication regimen    Paroxysmal atrial fibrillation (HCC)- (present on admission)  Assessment & Plan  On carvedilol.  Rate is controlled.  Hold anticoagulation until biopsies have been completed, last biopsy 9/27 in the AM  Eliquis held today in anticipation of tunneled dialysis catheter placement tomorrow    Hypocalcemia- (present on admission)  Assessment & Plan  Corrected calcium 8.9     Hyponatremia- (present on admission)  Assessment & Plan  Unclear if hypovolemia versus SIADH.  Urine osmolality is low.  On 1 L fluid per day fluid restriction and sodium bicarbonate tablets.  Nephrology following.  Started 80 mg IV Lasix daily today     Acute renal failure superimposed on stage 4 chronic kidney disease (HCC)- (present on admission)  Assessment & Plan  Creatinine is 4.09.   Baseline is about 1.7.  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  Underwent CT-guided renal biopsy 9/26 and results demonstrate membranous glomerulopathy, lymph node biopsy positive for stage IIIb endometrial cancer  Elected to start dialysis, appropriate orders placed by nephrology    Bilateral leg swelling- (present on admission)  Assessment & Plan  Initially started with 40 mg IV Lasix twice daily  Upon nephrology recommendation, increased to 80 mg daily  Monitor urine output       VTE prophylaxis: therapeutic anticoagulation with Eliquis    I have performed a physical exam and reviewed and updated ROS and Plan today (9/30/2022). In review of yesterday's note (9/29/2022), there are no changes except as documented above.

## 2022-09-30 NOTE — CARE PLAN
The patient is Stable - Low risk of patient condition declining or worsening    Shift Goals  Clinical Goals: safety  Patient Goals: rest    Progress made toward(s) clinical / shift goals:      Problem: Knowledge Deficit - Standard  Goal: Patient and family/care givers will demonstrate understanding of plan of care, disease process/condition, diagnostic tests and medications  Outcome: Progressing     Problem: Skin Integrity  Goal: Skin integrity is maintained or improved  Outcome: Progressing     Problem: Fall Risk  Goal: Patient will remain free from falls  Outcome: Progressing     Problem: Pain - Standard  Goal: Alleviation of pain or a reduction in pain to the patient’s comfort goal  Outcome: Progressing

## 2022-09-30 NOTE — DISCHARGE PLANNING
Received Choice form at 1015  Agency/Facility Name: [facility]   Referral sent per Choice form @ 1710

## 2022-09-30 NOTE — DISCHARGE PLANNING
ATTN: Case Management  RE: Referral for Home Health    As of 9/30/22, we have accepted the Home Health referral for the patient listed above.    A [facility] clinician will be out to see the patient within 48 hours. If you have any questions or concerns regarding the patient’s transition to Home Health, please do not hesitate to contact us at [phone].      We look forward to collaborating with you,  [facility] Health Team

## 2022-09-30 NOTE — CARE PLAN
The patient is Stable - Low risk of patient condition declining or worsening    Shift Goals  Clinical Goals: Safety, discharge  Patient Goals: Discharge    Progress made toward(s) clinical / shift goals:  Bedside commitment for safety interventions. Pt calls appropriately when needing to use the restroom. Awaiting provider medical clearance to discharge home.      Problem: Knowledge Deficit - Standard  Goal: Patient and family/care givers will demonstrate understanding of plan of care, disease process/condition, diagnostic tests and medications  Outcome: Progressing     Problem: Skin Integrity  Goal: Skin integrity is maintained or improved  Outcome: Progressing     Problem: Fall Risk  Goal: Patient will remain free from falls  Outcome: Progressing     Problem: Pain - Standard  Goal: Alleviation of pain or a reduction in pain to the patient’s comfort goal  Outcome: Progressing     Problem: Mobility  Goal: Patient's capacity to carry out activities will improve  Outcome: Progressing

## 2022-09-30 NOTE — FACE TO FACE
Face to Face Supporting Documentation - Home Health    The encounter with this patient was in whole or in part the primary reason for home health admission.    Date of encounter:   Patient:                    MRN:                       YOB: 2024  Aimee Mauro  [id number]  1945     Home health to see patient for:  Skilled Nursing care for assessment, interventions & education    Skilled need for:  Exacerbation of Chronic Disease State Adenocarcinoma    Skilled nursing interventions to include:  Comment: Skilled nursing care for interventions    Homebound status evidenced by:  Needs the assistance of another person in order to leave the home. Leaving home requires a considerable and taxing effort. There is a normal inability to leave the home.    Community Physician to provide follow up care: José Tian D.O.     Optional Interventions? No      I certify the face to face encounter for this home health care referral meets the CMS requirements and the encounter/clinical assessment with the patient was, in whole, or in part, for the medical condition(s) listed above, which is the primary reason for home health care. Based on my clinical findings: the service(s) are medically necessary, support the need for home health care, and the homebound criteria are met.  I certify that this patient has had a face to face encounter by myself.  Ashley Cook, CROW.P.R.N. - NPI: [id number]

## 2022-09-30 NOTE — PROGRESS NOTES
Gynecology Oncology Progress Note               Author: Kimberly Solis P.A.-C. Date & Time created: 9/30/2022  3:16 PM     Interval History:  Doing well today,  at bedside. Complains of fatigue. Denies pelvic or abdominal pain. States minimal vaginal bleeding.       Physical Exam:  Physical Exam  Constitutional:       General: She is not in acute distress.  Cardiovascular:      Rate and Rhythm: Normal rate.   Pulmonary:      Effort: Pulmonary effort is normal.   Abdominal:      General: Abdomen is flat. There is no distension.      Palpations: Abdomen is soft.   Skin:     General: Skin is warm and dry.   Neurological:      Mental Status: She is alert.       Labs:          Recent Labs     09/28/22 0348 09/29/22 0133 09/30/22  0800   SODIUM 122* 124* 125*   POTASSIUM 5.3 5.1 5.0   CHLORIDE 95* 95* 94*   CO2 20 20 22   BUN 57* 64* 64*   CREATININE 3.95* 3.98* 4.09*   MAGNESIUM 2.3 2.2 2.2   PHOSPHORUS 5.7* 5.4* 5.1*   CALCIUM 7.0* 7.0* 7.3*     Recent Labs     09/28/22 0348 09/29/22 0133 09/30/22  0800   ALTSGPT 13 13 14   ASTSGOT 22 20 20   ALKPHOSPHAT 66 63 69   TBILIRUBIN 0.2 0.2 0.2   DBILIRUBIN <0.2  --   --    GLUCOSE 83 94 97     Recent Labs     09/28/22 0348 09/29/22 0133 09/30/22  0800   RBC 2.91* 2.69* 2.91*   HEMOGLOBIN 8.5* 7.8* 8.5*   HEMATOCRIT 24.2* 22.2* 24.2*   PLATELETCT 60* 60* 76*   IRON 42  --   --    FERRITIN 400.0*  --   --    TOTIRONBC 139*  --   --      Recent Labs     09/28/22 0348 09/29/22 0133 09/30/22  0800   WBC 5.3 5.4 5.5   NEUTSPOLYS  --  54.40 66.20   LYMPHOCYTES  --  28.40 20.60*   MONOCYTES  --  12.50 9.90   EOSINOPHILS  --  3.20 1.80   BASOPHILS  --  1.10 1.10   ASTSGOT 22 20 20   ALTSGPT 13 13 14   ALKPHOSPHAT 66 63 69   TBILIRUBIN 0.2 0.2 0.2     Recent Labs     09/28/22  0348 09/29/22  0133 09/30/22  0800   SODIUM 122* 124* 125*   POTASSIUM 5.3 5.1 5.0   CHLORIDE 95* 95* 94*   CO2 20 20 22   GLUCOSE 83 94 97   BUN 57* 64* 64*   CREATININE 3.95* 3.98* 4.09*    CALCIUM 7.0* 7.0* 7.3*     Hemodynamics:  Temp (24hrs), Av.2 °C (99 °F), Min:36.9 °C (98.4 °F), Max:37.7 °C (99.8 °F)  Temperature: 36.9 °C (98.4 °F)  Pulse  Av.9  Min: 65  Max: 98   Blood Pressure : 126/69     Respiratory:    Respiration: 17, Pulse Oximetry: 95 %        RUL Breath Sounds: Clear, RML Breath Sounds: Clear;Diminished, RLL Breath Sounds: Diminished, LINDY Breath Sounds: Clear, LLL Breath Sounds: Diminished  Fluids:  No intake or output data in the 24 hours ending 22 1516     GI/Nutrition:  Orders Placed This Encounter   Procedures    Diet NPO Restrict to: Sips with Medications     Standing Status:   Standing     Number of Occurrences:   1     Order Specific Question:   Diet NPO Restrict to:     Answer:   Sips with Medications [3]     Medical Decision Making, by Problem:  Active Hospital Problems    Diagnosis     *Uterine mass [N85.8]     Normocytic anemia [D64.9]     Hyperphosphatemia [E83.39]     Hypertension [I10]     Thrombocytopenia (HCC) [D69.6]     Paroxysmal atrial fibrillation (HCC) [I48.0]     Hypocalcemia [E83.51]     Acute renal failure superimposed on stage 4 chronic kidney disease (HCC) [N17.9, N18.4]     Hyponatremia [E87.1]     Bilateral leg swelling [M79.89]        Plan:  This is a 76 y.o. female who presents with SOB and leg swelling, found to have severe hyponatremia, acute on chronic renal failure, found to have a uterine mass and PMB.      1.Endometrial cancer: Hypoechoic uterine mass measuring about 5.3 x 4.5 x 4.5 cm on US and CT with retroperitoneal lymphadenopathy. PMB x 2 months and vaginal bleeding on exam. 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 Treatment options discussed in detail including surgery, adjuvant treatment and hospice. Dr. Ames previously discussed in detail the risks and benefits of surgery. Patient would like to proceed with surgical intervention, robotic hysterectomy, bilateral salpingo-oophorectomy,  possible pelvic and/or periaortic lymph node dissection. Plan for Tuesday. Pre operative testing this weekend, bowel prep on Monday.     2. 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 Ames this would be exceedingly unusual. Dr. Hernandez has recommended dialysis, which will be initiated.  3. Hyponatremia: per Nephrology and IM, multifactorial, recommending limit hypotonic fluids and 3 times daily boost.   4. Hypertension: per Nephrology and IM, controlled. On Lasix 80 mg IV daily with adjustment once dialysis started.   5. Anemia: iron studies indicate anemia of chronic disease, and possibly from vaginal bleeding. CTM, plan for surgery on Tuesday.   6. Thrombocytopenia: unclear etiology, since August 2022, follow labs.       Case discussed with Dr. Ames

## 2022-09-30 NOTE — DISCHARGE PLANNING
Case Management Discharge Planning    Admission Date: 9/25/2022  GMLOS: 2.6  ALOS: 5    6-Clicks ADL Score: 20  6-Clicks Mobility Score: 21      Anticipated Discharge Dispo: Discharge Disposition: Discharged to home/self care (01)  Discharge Address: [address] [id number] ( 2 story, 14 steps to second floor and 2 steps into the home)  Discharge Contact Phone Number: [phone], [phone]    DME Needed: No    Action(s) Taken: OTHER, JAYESH RN reached out to Americo at [facility] to inquire if can accommodate lab draws MWF CBC and CMP. Americo escalated to nursing supervisor. Awaiting response.    JAYESH RN received notification from Americo at [facility] pt accepted with [facility]. JAYESH RN reached out confirmed labs can be drawn MWF with [facility].    Escalations Completed: Provider    Medically Clear: No    Next Steps: JAYESH RN to follow up with [facility]     Barriers to Discharge: OP labs draws    Is the patient up for discharge tomorrow: No

## 2022-09-30 NOTE — PROGRESS NOTES
Had extensive goals of care conversation with patient, family, Dr. Hernandez, Dr. Ames and sAhley VICKESR.  Based on biospy, she has Stage IIIC endometrial cancer.  Treatment options for this were  discussed in detail including diagnostic laparoscopy to determine if patient had extensive Stage IV disease. If this is the case, no further intervention would be recommend. Also discussed, surgical intervention with robotic hysterectomy to alleviate pain and vaginal bleeding. If patient and family desire adjuvant treatment he would recommend radiation. Due to kidney function, chemotherapy is not recommended.  The option of hospice was also discussed.     Dr. Ames reviewed that her prognosis is guarded and difficult to determine at this time in regard to her cancer and her response to above interventions. Reiterated risk of surgery with renal failure and Dr. Hernandez discussed risks as well.     All questions answered, family and patient will discuss options. Also discussed possibility of patient being discharged prior to surgery, which is ok from our standpoint, and she would follow up with our office next week for a pre operative appointment.     We will continue to follow along.     Thank you again for the opportunity to participate in her care, should there be any questions do not hesitate to call.     Case discussed with Dr. Ames and Dr. Ames present for conversation.     Kimberly Solis PA-C

## 2022-10-01 NOTE — PROGRESS NOTES
Surgical Progress Note    Author: Leti Grossman M.D. Date & Time created: 10/1/2022   2:10 PM     Interval Events:  Resting comfortably in bed.  Denies pain, minimal bleeding.  Tolerating p.o. without difficulty.    Hemodynamics:  Temp (24hrs), Av.8 °C (98.3 °F), Min:36.2 °C (97.1 °F), Max:37.5 °C (99.5 °F)  Temperature: 36.7 °C (98 °F)  Pulse  Av.1  Min: 65  Max: 98   Blood Pressure : 130/76     Respiratory:    Respiration: 16, Pulse Oximetry: 95 %        RUL Breath Sounds: Clear, RML Breath Sounds: Clear;Diminished, RLL Breath Sounds: Diminished, LINDY Breath Sounds: Clear, LLL Breath Sounds: Diminished  Neuro:  GCS       Fluids:  No intake or output data in the 24 hours ending 10/01/22 1410     Current Diet Order   Procedures    Diet NPO Restrict to: Sips with Medications     Physical Exam  HENT:      Head: Normocephalic.   Cardiovascular:      Rate and Rhythm: Normal rate.   Pulmonary:      Effort: Pulmonary effort is normal.   Abdominal:      General: There is no distension.      Palpations: Abdomen is soft.      Tenderness: There is no abdominal tenderness.   Skin:     General: Skin is warm and dry.   Neurological:      Mental Status: She is alert.     Labs:  Recent Results (from the past 24 hour(s))   Comp Metabolic Panel    Collection Time: 10/01/22  7:43 AM   Result Value Ref Range    Sodium 126 (L) 135 - 145 mmol/L    Potassium 5.0 3.6 - 5.5 mmol/L    Chloride 97 96 - 112 mmol/L    Co2 20 20 - 33 mmol/L    Anion Gap 9.0 7.0 - 16.0    Glucose 96 65 - 99 mg/dL    Bun 66 (H) 8 - 22 mg/dL    Creatinine 3.96 (H) 0.50 - 1.40 mg/dL    Calcium 7.5 (L) 8.5 - 10.5 mg/dL    AST(SGOT) 23 12 - 45 U/L    ALT(SGPT) 15 2 - 50 U/L    Alkaline Phosphatase 71 30 - 99 U/L    Total Bilirubin 0.2 0.1 - 1.5 mg/dL    Albumin 1.6 (L) 3.2 - 4.9 g/dL    Total Protein 4.4 (L) 6.0 - 8.2 g/dL    Globulin 2.8 1.9 - 3.5 g/dL    A-G Ratio 0.6 g/dL   MAGNESIUM    Collection Time: 10/01/22  7:43 AM   Result Value Ref Range     Magnesium 2.2 1.5 - 2.5 mg/dL   PHOSPHORUS    Collection Time: 10/01/22  7:43 AM   Result Value Ref Range    Phosphorus 5.1 (H) 2.5 - 4.5 mg/dL   ESTIMATED GFR    Collection Time: 10/01/22  7:43 AM   Result Value Ref Range    GFR (CKD-EPI) 11 (A) >60 mL/min/1.73 m 2   CBC WITH DIFFERENTIAL    Collection Time: 10/01/22  7:44 AM   Result Value Ref Range    WBC 5.4 4.8 - 10.8 K/uL    RBC 2.99 (L) 4.20 - 5.40 M/uL    Hemoglobin 8.8 (L) 12.0 - 16.0 g/dL    Hematocrit 24.8 (L) 37.0 - 47.0 %    MCV 82.9 81.4 - 97.8 fL    MCH 29.4 27.0 - 33.0 pg    MCHC 35.5 (H) 33.6 - 35.0 g/dL    RDW 43.9 35.9 - 50.0 fL    Platelet Count 72 (L) 164 - 446 K/uL    MPV 9.5 9.0 - 12.9 fL    Neutrophils-Polys 60.00 44.00 - 72.00 %    Lymphocytes 23.60 22.00 - 41.00 %    Monocytes 11.80 0.00 - 13.40 %    Eosinophils 2.60 0.00 - 6.90 %    Basophils 1.10 0.00 - 1.80 %    Immature Granulocytes 0.90 0.00 - 0.90 %    Nucleated RBC 0.00 /100 WBC    Neutrophils (Absolute) 3.25 2.00 - 7.15 K/uL    Lymphs (Absolute) 1.28 1.00 - 4.80 K/uL    Monos (Absolute) 0.64 0.00 - 0.85 K/uL    Eos (Absolute) 0.14 0.00 - 0.51 K/uL    Baso (Absolute) 0.06 0.00 - 0.12 K/uL    Immature Granulocytes (abs) 0.05 0.00 - 0.11 K/uL    NRBC (Absolute) 0.00 K/uL     Medical Decision Making, by Problem:  Active Hospital Problems    Diagnosis     Normocytic anemia [D64.9]     Hyperphosphatemia [E83.39]     Hypertension [I10]     Thrombocytopenia (HCC) [D69.6]     Paroxysmal atrial fibrillation (HCC) [I48.0]      Patient had an episode of afib on telemetry. She had an echo that did not report MR. She is on coreg. She not on blood thinners currently. She has an appointment with cardiology on 9/20.      Uterine mass [N85.8]      Patient had any incidental finding for uterine mass measuring 4.8 x 3.7 x 4.3 cm found on renal US. No un-intentional weight loss, no night sweats or fevers       Hypocalcemia [E83.51]     Acute renal failure superimposed on stage 4 chronic kidney disease  (HCC) [N17.9, N18.4]      Labs from 9/7/22  -Creatine 2.8  -GFR 18    Patient had an echo ruling out heart failure. Retroperionteal US did not mention hydronephrosis.       Hyponatremia [E87.1]     Bilateral leg swelling [M79.89]      Plan:  This is a 76 y.o. female who presents with SOB and leg swelling, found to have severe hyponatremia, acute on chronic renal failure, and a uterine mass with PMB.      1.Endometrial cancer: Hypoechoic uterine mass measuring about 5.3 x 4.5 x 4.5 cm on US and CT with retroperitoneal lymphadenopathy. 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 Treatment options discussed in detail including surgery, adjuvant treatment and hospice. Patient would like to proceed with surgical intervention, robotic hysterectomy, bilateral salpingo-oophorectomy, possible pelvic and/or periaortic lymph node dissection with Dr. Ames next Tuesday. Pre operative testing this weekend, bowel prep on Monday.   2. 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 Dr. Hernandez has recommended dialysis, which will be initiated once TDC placed.  3. Hyponatremia: per Nephrology and IM, multifactorial, recommending limit hypotonic fluids and 3 times daily boost.   4. Hypertension: per Nephrology and IM, controlled. On Lasix   5. Anemia: iron studies indicate anemia of chronic disease, possibly from vaginal bleeding, on iron  6. Thrombocytopenia: unclear etiology, since August 2022, follow labs.   7. Afib: on eliquis, will need to hold x 48 hours prior to surgery

## 2022-10-01 NOTE — OR SURGEON
Immediate Post- Operative Note        Findings: TDC      Procedure(s): TDC placement      Estimated Blood Loss: Less than 5 ml        Complications: None            10/1/2022     2:26 PM     Marco Antonio Silva M.D.

## 2022-10-01 NOTE — DISCHARGE PLANNING
Outpatient Dialysis Placement Notification     Received notification for outpatient dialysis placement from Dr. Hernandez      Met/Spoke with patient and confirmed demographics and insurance information.  Provided patient with dialysis education materials and list of HD centers, referral initiated to:    [facility]   [address]  [place] [address]     Pending medical and financial clearance.     Xitlaly Reyes - Dialysis Coordinator   Ph#: [phone]  [facility] [employer]

## 2022-10-01 NOTE — CARE PLAN
The patient is Watcher - Medium risk of patient condition declining or worsening    Shift Goals  Clinical Goals: NPO @0000 for dialysis cath  Patient Goals: rest, discharge    Progress made toward(s) clinical / shift goals:      Problem: Knowledge Deficit - Standard  Goal: Patient and family/care givers will demonstrate understanding of plan of care, disease process/condition, diagnostic tests and medications  Outcome: Progressing     Problem: Skin Integrity  Goal: Skin integrity is maintained or improved  Outcome: Progressing     Problem: Fall Risk  Goal: Patient will remain free from falls  Outcome: Progressing     Problem: Pain - Standard  Goal: Alleviation of pain or a reduction in pain to the patient’s comfort goal  Outcome: Progressing       Patient is not progressing towards the following goals:

## 2022-10-01 NOTE — PROGRESS NOTES
Pt presents to IR2. Pt was consented by MD at bedside, confirmed by this RN and consent at bedside. Pt transferred to IR table in supine position. Patient underwent a tunneled hd catheter placement by Dr. Silva. Procedure site was marked by MD and verified using imaging guidance. Pt placed on monitor, prepped and draped in a sterile fashion. Vitals were taken every 5 minutes and remained stable during procedure (see doc flow sheet for results). CO2 waveform capnography was monitored and remained WNL throughout procedure. Report called to Edna BROWN. Pt transported by florencia with RN to [id number].      [place] [facility] air guard longterm HD cathter with preload stylet  14.5 x 19cm  REF: [id number]  LOT: [id number]  Exp: 11/30/2023

## 2022-10-01 NOTE — CARE PLAN
The patient is Stable - Low risk of patient condition declining or worsening    Shift Goals  Clinical Goals: Dialysis catheter placement, initiate dialysis  Patient Goals: Rest, dialysis  Family Goals: Updates on plan of care    Progress made toward(s) clinical / shift goals: Pt has been NPO since midnight for dialysis catheter placement. Catheter placement scheduled for [id number]. Continue to update patient and  with any changes. Call light within reach and will continue to monitor.    Problem: Knowledge Deficit - Standard  Goal: Patient and family/care givers will demonstrate understanding of plan of care, disease process/condition, diagnostic tests and medications  Outcome: Progressing     Problem: Skin Integrity  Goal: Skin integrity is maintained or improved  Outcome: Progressing     Problem: Fall Risk  Goal: Patient will remain free from falls  Outcome: Progressing     Problem: Pain - Standard  Goal: Alleviation of pain or a reduction in pain to the patient’s comfort goal  Outcome: Progressing

## 2022-10-01 NOTE — PROGRESS NOTES
Hospital Medicine Daily Progress Note    Date of Service  10/1/2022    Chief Complaint  Aimee Mauro is a 76 y.o. female admitted 9/25/2022 with hyponatremia    Hospital Course  Aimee Mauro is a 76 y.o. female who presented 9/25/2022 with a previous history of stage IV chronic kidney disease, paroxysmal A. fib, anticoagulation on apixaban.  Patient was originally admitted to [facility] on September 18 after presenting for evaluation of peripheral edema.  At that time she was found to have sodium of 117, and this was new for her.  She was in the hospital until the following day when she left AMA as she had not been able to get any sleep.     Patient was seen as an outpatient for follow-up, and was again found to have low sodium and told to come back to the emergency room so she presented back to [facility] on September 23.  At this time she was noted to have worsening renal function with a creatinine that had gone from 2.7-3.5.  Sodium again was found to be low around 116-117.  She was again admitted and work-up was entertained including a CT of the abdomen pelvis.  This is demonstrated a large uterine mass, with lymphadenopathy highly concerning for metastatic process.  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  The etiology of her worsening renal function is as of yet unclear, as the CT did not demonstrate any hydronephrosis.  She is transferred to [facility] for evaluation by gynecologic and for renal biopsy.    Dr. Ames was consulted and recommended IR directed biopsy of the lymph note to rule out metastatic disease, completed 9/26/22 and results are pending.    Interval Problem Update  10/1/2022: Patient seen and examined with  Willy. No complaints, eager to get her tunneled catheter today and start dialysis.     -Vital signs reviewed, within normal limits  -SR  70-80s for days, discontinued telemetry  -Nephrology and Dr. Ames following, appreciate recommendations  -On lasix 80 mg IV daily with minimal urine output  -Labs reviewed, Hgb stable at 8.8, Na 126, Cr 3.96, Phos 5.1  -NPO for tunneled catheter placement today  -Dialysis per nephrology  -Plan for robotic hysterectomy on Tuesday    Discharge planning: Planning for home discharge on Wednesday after surgery. Home health accepted.     I have discussed this patient's plan of care and discharge plan at IDT rounds today with Case Management, Nursing, Nursing leadership, and other members of the IDT team.    Consultants/Specialty  nephrology and oncological surgery    Code Status  DNAR, I OK    Disposition  Patient is not medically cleared for discharge.   Anticipate discharge to to home with close outpatient follow-up.  I have placed the appropriate orders for post-discharge needs.    Review of Systems  Review of Systems   Constitutional:  Positive for malaise/fatigue. Negative for chills and fever.   HENT:  Positive for hearing loss. Negative for sore throat.    Eyes:  Negative for blurred vision and pain.   Respiratory:  Negative for cough and shortness of breath.    Cardiovascular:  Positive for leg swelling. Negative for chest pain and palpitations.   Gastrointestinal:  Negative for abdominal pain and heartburn.   Genitourinary:  Negative for dysuria.   Musculoskeletal:  Negative for back pain.   Neurological:  Positive for weakness. Negative for dizziness and headaches.   Psychiatric/Behavioral:  Negative for depression.    All other systems reviewed and are negative.     Physical Exam  Temp:  [36.2 °C (97.1 °F)-37.7 °C (99.8 °F)] 36.4 °C (97.5 °F)  Pulse:  [68-94] 68  Resp:  [16-17] 17  BP: (109-138)/(59-79) 135/64  SpO2:  [92 %-95 %] 93 %    Physical Exam  Constitutional:       Appearance: She is obese. She is ill-appearing.   HENT:      Head: Normocephalic and atraumatic.      Right Ear: External ear normal.       Left Ear: External ear normal.      Nose: Nose normal.      Mouth/Throat:      Mouth: Mucous membranes are moist.      Pharynx: Oropharynx is clear.   Eyes:      General: No scleral icterus.  Cardiovascular:      Rate and Rhythm: Normal rate and regular rhythm.      Pulses: Normal pulses.      Heart sounds: Normal heart sounds. No murmur heard.  Pulmonary:      Effort: Pulmonary effort is normal. No respiratory distress.      Breath sounds: Normal breath sounds.   Abdominal:      General: Abdomen is flat. Bowel sounds are normal.      Palpations: Abdomen is soft.      Tenderness: There is no abdominal tenderness.   Musculoskeletal:      Cervical back: Normal range of motion.      Right lower leg: Edema present.      Left lower leg: Edema present.   Skin:     General: Skin is warm.      Capillary Refill: Capillary refill takes less than 2 seconds.   Neurological:      Mental Status: She is alert and oriented to person, place, and time.   Psychiatric:         Mood and Affect: Mood normal.       Fluids  No intake or output data in the 24 hours ending 10/01/22 0658      Laboratory  Recent Labs     09/29/22  0133 09/30/22  0800   WBC 5.4 5.5   RBC 2.69* 2.91*   HEMOGLOBIN 7.8* 8.5*   HEMATOCRIT 22.2* 24.2*   MCV 82.5 83.2   MCH 29.0 29.2   MCHC 35.1* 35.1*   RDW 42.4 43.9   PLATELETCT 60* 76*   MPV 10.3 10.2       Recent Labs     09/29/22  0133 09/30/22  0800   SODIUM 124* 125*   POTASSIUM 5.1 5.0   CHLORIDE 95* 94*   CO2 20 22   GLUCOSE 94 97   BUN 64* 64*   CREATININE 3.98* 4.09*   CALCIUM 7.0* 7.3*                       Imaging  CT-NEEDLE BX-LYMPH NODE   Final Result      CT-guided retroperitoneal lymph node biopsy.         CT-NEEDLE BX-RENAL   Final Result      1.  CT GUIDED RIGHT RENAL BIOPSY.      IR-MELARA,GROSHONG PLACEMENT >5    (Results Pending)          Assessment/Plan  * Uterine mass- (present on admission)  Assessment & Plan  Uterine mass with retroperitoneal lymphadenopathy.   is elevated (301).  Per  Gyne/Onc, patient will need a uterine biopsy.  Further recommendations per Dr. Ames's team  Patient will undergo robotic hysterectomy with Dr. Ames on Tuesday    Hyperphosphatemia  Assessment & Plan  Phos remains elevated  Started Phoslo  Nephrology following    Normocytic anemia  Assessment & Plan  Hgb stable 7-8   no signs of bleeding  Iron studies indicate anemia of chronic disease  Started oral iron supplementation    Thrombocytopenia (HCC)- (present on admission)  Assessment & Plan  Unknown history or etiology of thrombocytopenia.  Platelets are stable since August 2022.  To stop anticoagulation if platelets drop below 50.   76 today.    Hypertension- (present on admission)  Assessment & Plan  Stable.  Continue current medication regimen    Paroxysmal atrial fibrillation (HCC)- (present on admission)  Assessment & Plan  On carvedilol.  Rate is controlled.  Hold anticoagulation until biopsies have been completed, last biopsy 9/27 in the AM  Eliquis held today in anticipation of tunneled dialysis catheter placement today    Hypocalcemia- (present on admission)  Assessment & Plan  Corrected calcium 8.9     Hyponatremia- (present on admission)  Assessment & Plan  Unclear if hypovolemia versus SIADH.  Urine osmolality is low.  On 1 L fluid per day fluid restriction and sodium bicarbonate tablets.  Nephrology following.  Started 80 mg IV Lasix daily    Acute renal failure superimposed on stage 4 chronic kidney disease (HCC)- (present on admission)  Assessment & Plan  Creatinine is maintaining between 3-4.  Baseline is about 1.7.  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 In review of yesterday's note (9/30/2022), there are no changes except as documented above.

## 2022-10-01 NOTE — PROCEDURES
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  Requiring dialysis, started on dialysis 10/1/2022. Patient seen and examined on hemodialysis during treatment #1. Patient is stable, tolerating hemodialysis. Denies chest pain and shortness of breath. Orders updated as needed. Please refer to flowsheet for full details.    Access: Right IJ permacath  UF goal: 1.5 L    Plan: Plan on 3 days in a row of dialysis initiation, followed by thrice weekly dialysis.  I am cautiously optimistic her kidneys may improve with cancer debulking surgery.  Patient currently scheduled for robotic hysterectomy on Tuesday with Dr. Ames.    Anton Hernandez MD  [facility]

## 2022-10-02 NOTE — PROGRESS NOTES
Hospital Medicine Daily Progress Note    Date of Service  10/2/2022    Chief Complaint  Aimee Mauro is a 76 y.o. female admitted 9/25/2022 with hyponatremia    Hospital Course  Aimee Mauro is a 76 y.o. female who presented 9/25/2022 with a previous history of stage IV chronic kidney disease, paroxysmal A. fib, anticoagulation on apixaban.  Patient was originally admitted to [facility] on September 18 after presenting for evaluation of peripheral edema.  At that time she was found to have sodium of 117, and this was new for her.  She was in the hospital until the following day when she left AMA as she had not been able to get any sleep.     Patient was seen as an outpatient for follow-up, and was again found to have low sodium and told to come back to the emergency room so she presented back to [facility] on September 23.  At this time she was noted to have worsening renal function with a creatinine that had gone from 2.7-3.5.  Sodium again was found to be low around 116-117.  She was again admitted and work-up was entertained including a CT of the abdomen pelvis.  This is demonstrated a large uterine mass, with lymphadenopathy highly concerning for metastatic process.  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  The etiology of her worsening renal function is as of yet unclear, as the CT did not demonstrate any hydronephrosis.  She is transferred to [facility] for evaluation by gynecologic and for renal biopsy.    Dr. Ames was consulted and recommended IR directed biopsy of the lymph note to rule out metastatic disease, completed 9/26/22 and results are pending.    Interval Problem Update  10/2/2022: Patient seen and examined,  and son at bedside. Feels tired after dialysis. Edema unchanged.    -Vital signs reviewed, BP soft with adequate MAP of 74  -Labs reviewed,  Hgb stable at 8.7, Na stable at 128, Cr 3.65  -Nephrology and Dr. Ames following, appreciate recommendations  -On lasix 80 mg IV daily with minimal urine output  -Labs reviewed, Hgb stable at 8.8, Na 126, Cr 3.96, Phos 5.1  -Initiated and tolerated dialysis yesterday x 3 days  -Plan for robotic hysterectomy on Tuesday    Discharge planning: Planning for home discharge on Wednesday after surgery. Home health accepted.     I have discussed this patient's plan of care and discharge plan at IDT rounds today with Case Management, Nursing, Nursing leadership, and other members of the IDT team.    Consultants/Specialty  nephrology and oncological surgery    Code Status  DNAR, I OK    Disposition  Patient is not medically cleared for discharge.   Anticipate discharge to to home with close outpatient follow-up.  I have placed the appropriate orders for post-discharge needs.    Review of Systems  Review of Systems   Constitutional:  Positive for malaise/fatigue. Negative for chills and fever.   HENT:  Positive for hearing loss. Negative for sore throat.    Eyes:  Negative for blurred vision and pain.   Respiratory:  Negative for cough and shortness of breath.    Cardiovascular:  Positive for leg swelling. Negative for chest pain and palpitations.   Gastrointestinal:  Negative for abdominal pain and heartburn.   Genitourinary:  Negative for dysuria.   Musculoskeletal:  Negative for back pain.   Neurological:  Positive for weakness. Negative for dizziness and headaches.   Psychiatric/Behavioral:  Negative for depression.    All other systems reviewed and are negative.     Physical Exam  Temp:  [36.3 °C (97.3 °F)-37.5 °C (99.5 °F)] 36.7 °C (98.1 °F)  Pulse:  [65-95] 95  Resp:  [9-20] 16  BP: ()/(50-81) 100/61  SpO2:  [91 %-98 %] 91 %    Physical Exam  Constitutional:       Appearance: She is obese. She is ill-appearing.   HENT:      Head: Normocephalic and atraumatic.      Right Ear: External ear normal.      Left Ear:  External ear normal.      Nose: Nose normal.      Mouth/Throat:      Mouth: Mucous membranes are moist.      Pharynx: Oropharynx is clear.   Eyes:      General: No scleral icterus.  Cardiovascular:      Rate and Rhythm: Normal rate and regular rhythm.      Pulses: Normal pulses.      Heart sounds: Normal heart sounds. No murmur heard.  Pulmonary:      Effort: Pulmonary effort is normal. No respiratory distress.      Breath sounds: Normal breath sounds.   Abdominal:      General: Abdomen is flat. Bowel sounds are normal.      Palpations: Abdomen is soft.      Tenderness: There is no abdominal tenderness.   Musculoskeletal:      Cervical back: Normal range of motion.      Right lower leg: Edema present.      Left lower leg: Edema present.   Skin:     General: Skin is warm.      Capillary Refill: Capillary refill takes less than 2 seconds.   Neurological:      Mental Status: She is alert and oriented to person, place, and time.   Psychiatric:         Mood and Affect: Mood normal.       Fluids    Intake/Output Summary (Last 24 hours) at 10/2/2022 0701  Last data filed at 10/1/2022 1700  Gross per 24 hour   Intake 500 ml   Output 2000 ml   Net -1500 ml         Laboratory  Recent Labs     09/30/22  0800 10/01/22  0744 10/02/22  0536   WBC 5.5 5.4 5.1   RBC 2.91* 2.99* 2.98*   HEMOGLOBIN 8.5* 8.8* 8.7*   HEMATOCRIT 24.2* 24.8* 25.3*   MCV 83.2 82.9 84.9   MCH 29.2 29.4 29.2   MCHC 35.1* 35.5* 34.4   RDW 43.9 43.9 45.1   PLATELETCT 76* 72* 60*   MPV 10.2 9.5 10.5       Recent Labs     09/30/22  0800 10/01/22  0743 10/02/22  0536   SODIUM 125* 126* 128*   POTASSIUM 5.0 5.0 4.5   CHLORIDE 94* 97 96   CO2 22 20 24   GLUCOSE 97 96 107*   BUN 64* 66* 53*   CREATININE 4.09* 3.96* 3.65*   CALCIUM 7.3* 7.5* 7.2*                       Imaging  CT-NEEDLE BX-LYMPH NODE   Final Result      CT-guided retroperitoneal lymph node biopsy.         CT-NEEDLE BX-RENAL   Final Result      1.  CT GUIDED RIGHT RENAL BIOPSY.       IR-SARITHAGROKATIE PLACEMENT >5    (Results Pending)          Assessment/Plan  * Uterine mass- (present on admission)  Assessment & Plan  Uterine mass with retroperitoneal lymphadenopathy.   is elevated (301).  Per Gyne/Onc, patient will need a uterine biopsy.  Further recommendations per Dr. Ames's team  Patient will undergo robotic hysterectomy with Dr. Ames on Tuesday    Hyperphosphatemia  Assessment & Plan  Phos remains elevated  Started Phoslo  Nephrology following    Normocytic anemia  Assessment & Plan  Hgb stable 7-8   no signs of bleeding  Iron studies indicate anemia of chronic disease  Started oral iron supplementation    Thrombocytopenia (HCC)- (present on admission)  Assessment & Plan  Unknown history or etiology of thrombocytopenia.  Platelets are stable since August 2022.  To stop anticoagulation if platelets drop below 50.     Hypertension- (present on admission)  Assessment & Plan  Stable.  Continue current medication regimen    Paroxysmal atrial fibrillation (HCC)- (present on admission)  Assessment & Plan  On carvedilol.  Rate is controlled.  Hold anticoagulation until biopsies have been completed, last biopsy 9/27 in the AM  Eliquis held for tunneled dialysis catheter placement and surgery Tuesday    Hypocalcemia- (present on admission)  Assessment & Plan  Corrected calcium 8.9     Hyponatremia- (present on admission)  Assessment & Plan  Unclear if hypovolemia versus SIADH.  Urine osmolality is low.  On 1 L fluid per day fluid restriction and sodium bicarbonate tablets.  Nephrology following.  Started 80 mg IV Lasix daily    Acute renal failure superimposed on stage 4 chronic kidney disease (HCC)- (present on admission)  Assessment & Plan  Creatinine is maintaining between 3-4.  Baseline is about 1.7.  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  Underwent CT-guided renal biopsy 9/26 and results demonstrate membranous glomerulopathy, lymph node  biopsy positive for stage IIIb endometrial cancer  Dialysis started 10/1/22    Bilateral leg swelling- (present on admission)  Assessment & Plan  Initially started with 40 mg IV Lasix twice daily  Upon nephrology recommendation, increased to 80 mg daily  Monitor urine output       VTE prophylaxis: therapeutic anticoagulation with Eliquis    I have performed a physical exam and reviewed and updated ROS and Plan today (10/2/2022). In review of yesterday's note (10/1/2022), there are no changes except as documented above.

## 2022-10-02 NOTE — PROGRESS NOTES
[facility] Progress Note    Hemodialysis treatment #1 ordered today per Dr. Hernandez x 2 hours. Treatment initiated at 1449 and ended at 1649.  Pt A/Ox3, stable, denies any discomfort, VSS.    Pt tolerated treatment today, no issues reported.; see e-flow sheets for details.    Net UF 1,500 mL    Post tx, CVC flushed with saline then locked with heparin 1000 units/mL per designated amount in each wing then clamped and capped. Aspirate heparin prior to next CVC use.    Report given to Primary RN.

## 2022-10-02 NOTE — PROCEDURES
Nephrology/Hemodialysis note    Patient with metastatic endometrial cancer, membranous GN -secondary to cancer disease, started on RRT  Seen and examined during dialysis  Tolerates well  VS stable  Lab results reviewed  Please see dialysis flow sheet for details

## 2022-10-02 NOTE — PROGRESS NOTES
2 1/2hr HD started @ 0942 and completed @ 1214,net UF = 1500ml,soft bp during tx,asymptomatic,tx well tolerated otherwise.RILAXMI TDC dressing changed,CDI,report given to Stacey Ca RN.

## 2022-10-02 NOTE — PROGRESS NOTES
Surgical Progress Note    Author: Leti Grossman M.D.      Interval Events:  Eating breakfast w/  at BS. Underwent dialysis yesterday. Denies any new c/o .     Hemodynamics:  Temp (24hrs), Av.8 °C (98.3 °F), Min:36.3 °C (97.3 °F), Max:37.5 °C (99.5 °F)  Temperature: 37 °C (98.6 °F)  Pulse  Av.3  Min: 65  Max: 98   Blood Pressure : 127/64     Respiratory:    Respiration: 16, Pulse Oximetry: 94 %        RUL Breath Sounds: Clear, RML Breath Sounds: Clear;Diminished, RLL Breath Sounds: Diminished, LINDY Breath Sounds: Clear, LLL Breath Sounds: Diminished  Neuro:  GCS       Fluids:  No intake or output data in the 24 hours ending 10/01/22 1410     Current Diet Order   Procedures    Diet Order Diet: Renal     Physical Exam  HENT:      Head: Normocephalic.   Cardiovascular:      Rate and Rhythm: Normal rate.   Pulmonary:      Effort: Pulmonary effort is normal.   Abdominal:      General: There is no distension.      Palpations: Abdomen is soft.      Tenderness: There is no abdominal tenderness.   Skin:     General: Skin is warm and dry.   Neurological:      Mental Status: She is alert.     Labs:  Recent Results (from the past 24 hour(s))   CBC WITHOUT DIFFERENTIAL    Collection Time: 10/02/22  5:36 AM   Result Value Ref Range    WBC 5.1 4.8 - 10.8 K/uL    RBC 2.98 (L) 4.20 - 5.40 M/uL    Hemoglobin 8.7 (L) 12.0 - 16.0 g/dL    Hematocrit 25.3 (L) 37.0 - 47.0 %    MCV 84.9 81.4 - 97.8 fL    MCH 29.2 27.0 - 33.0 pg    MCHC 34.4 33.6 - 35.0 g/dL    RDW 45.1 35.9 - 50.0 fL    Platelet Count 60 (L) 164 - 446 K/uL    MPV 10.5 9.0 - 12.9 fL   Basic Metabolic Panel    Collection Time: 10/02/22  5:36 AM   Result Value Ref Range    Sodium 128 (L) 135 - 145 mmol/L    Potassium 4.5 3.6 - 5.5 mmol/L    Chloride 96 96 - 112 mmol/L    Co2 24 20 - 33 mmol/L    Glucose 107 (H) 65 - 99 mg/dL    Bun 53 (H) 8 - 22 mg/dL    Creatinine 3.65 (H) 0.50 - 1.40 mg/dL    Calcium 7.2 (L) 8.5 - 10.5 mg/dL    Anion Gap 8.0 7.0 - 16.0    MAGNESIUM    Collection Time: 10/02/22  5:36 AM   Result Value Ref Range    Magnesium 2.1 1.5 - 2.5 mg/dL   PHOSPHORUS    Collection Time: 10/02/22  5:36 AM   Result Value Ref Range    Phosphorus 4.7 (H) 2.5 - 4.5 mg/dL   ESTIMATED GFR    Collection Time: 10/02/22  5:36 AM   Result Value Ref Range    GFR (CKD-EPI) 12 (A) >60 mL/min/1.73 m 2     Medical Decision Making, by Problem:  Active Hospital Problems    Diagnosis     Normocytic anemia [D64.9]     Hyperphosphatemia [E83.39]     Hypertension [I10]     Thrombocytopenia (HCC) [D69.6]     Paroxysmal atrial fibrillation (HCC) [I48.0]      Patient had an episode of afib on telemetry. She had an echo that did not report MR. She is on coreg. She not on blood thinners currently. She has an appointment with cardiology on 9/20.      Uterine mass [N85.8]      Patient had any incidental finding for uterine mass measuring 4.8 x 3.7 x 4.3 cm found on renal US. No un-intentional weight loss, no night sweats or fevers       Hypocalcemia [E83.51]     Acute renal failure superimposed on stage 4 chronic kidney disease (HCC) [N17.9, N18.4]      Labs from 9/7/22  -Creatine 2.8  -GFR 18    Patient had an echo ruling out heart failure. Retroperionteal US did not mention hydronephrosis.       Hyponatremia [E87.1]     Bilateral leg swelling [M79.89]      Plan:  This is a 76 y.o. female who presents with SOB and leg swelling, found to have severe hyponatremia, acute on chronic renal failure, and a uterine mass with PMB.      1.Endometrial cancer: Hypoechoic uterine mass measuring about 5.3 x 4.5 x 4.5 cm on US and CT with retroperitoneal lymphadenopathy. 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 Treatment options discussed in detail including surgery, adjuvant treatment and hospice. Patient would like to proceed with surgical intervention, robotic hysterectomy, bilateral salpingo-oophorectomy, possible pelvic and/or periaortic lymph node  dissection with Dr. Ames on Tuesday. CLD and bowel prep tomorrow.   2. 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 Undergoing HD per nephrology.   3. Hyponatremia: per Nephrology and IM, multifactorial, recommending limit hypotonic fluids and 3 times daily boost.   4. Hypertension: per Nephrology and IM, controlled. On Lasix   5. Anemia: iron studies indicate anemia of chronic disease, possibly from vaginal bleeding, on iron  6. Thrombocytopenia: unclear etiology, since August 2022, follow labs.   7. Afib: on eliquis, will need to hold x 48 hours prior to surgery

## 2022-10-02 NOTE — CARE PLAN
The patient is Watcher - Medium risk of patient condition declining or worsening    Shift Goals  Clinical Goals: pt will rest comfortably through the night  Patient Goals: pt will rest and sleep comfortably  Family Goals: not present    Progress made toward(s) clinical / shift goals: pt has been resting and sleeping comfortably throughout the night    Patient is not progressing towards the following goals: N/A      Problem: Knowledge Deficit - Standard  Goal: Patient and family/care givers will demonstrate understanding of plan of care, disease process/condition, diagnostic tests and medications  Outcome: Progressing  Note: Pt is alert and oriented x 4; is aware and understands plan of care; all questions have been answered at this time.       Problem: Skin Integrity  Goal: Skin integrity is maintained or improved  Outcome: Progressing     Problem: Fall Risk  Goal: Patient will remain free from falls  Outcome: Progressing  Note: Bed is locked in the lowest position and pt is wearing treaded non-slip socks; pt has been calling for assistance appropriately before trying to get out of bed     Problem: Pain - Standard  Goal: Alleviation of pain or a reduction in pain to the patient’s comfort goal  Outcome: Progressing  Note: Pain has been frequently assessed throughout the night and pt has had no complaints of pain at this time.

## 2022-10-03 NOTE — FACE TO FACE
Face to Face Supporting Documentation - Home Health    The encounter with this patient was in whole or in part the primary reason for home health admission.    Date of encounter:   Patient:                    MRN:                       YOB: 2024  Aimee Mauro  [id number]  1945     Home health to see patient for:  Skilled Nursing care for assessment, interventions & education    Skilled need for:  Exacerbation of Chronic Disease State Endometrial cancer    Skilled nursing interventions to include:  Comment: assessment    Homebound status evidenced by:  Need the aid of supportive devices such as crutches, canes, wheelchairs or walkers. Leaving home requires a considerable and taxing effort. There is a normal inability to leave the home.    Community Physician to provide follow up care: José Tian D.O.     Optional Interventions? No      I certify the face to face encounter for this home health care referral meets the CMS requirements and the encounter/clinical assessment with the patient was, in whole, or in part, for the medical condition(s) listed above, which is the primary reason for home health care. Based on my clinical findings: the service(s) are medically necessary, support the need for home health care, and the homebound criteria are met.  I certify that this patient has had a face to face encounter by myself.  Ashley Cook, CROW.[email or web address]. - NPI: [id number]

## 2022-10-03 NOTE — DISCHARGE PLANNING
"TCN following. HTH/SCP chart review completed. Collaborated with Primary JAYESH Rogers. Patient seen at bedside along with Spouse. Mbr states she does not need Acute Rehab and will prefer DC to home when she is medically cleared. Mbr has no additional health plan related questions at this time.    Mbr is S/P CT-guided Right Renal  Biopsy on 9/26/2022;   S/p TDC placement 10/01/2022 ;   Mbr on Hemodialysis. Nephrology Medicine following mbr.    PT noted on 9/28/2022 mbr mobilizing at PLOF baseline.    Gynecology Oncology  Medicine following mbr secondary to \" Endometrial cancer: Hypoechoic uterine mass measuring about 5.3 x 4.5 x 4.5 cm on US and CT with retroperitoneal lymphadenopathy. 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 Ames on Tuesday..\" as noted by Dr Grossman on  10/02/2022.    Mbr is currently not medically cleared for DC with plans for surgery  And DC to home with possible HH services, outpatient Dialysis with [facility] .  [facility] Accepted on 9/30/2022.  Mbr currently has Low LACE score. Therefore, [facility] referral will not be sent at this time.   TCN will continue to follow and collaborate with discharge planning team as additional post acute needs arise. Thank you.    Completed today:  Request for order for HH order  Previously completed:  - Choice forms: HH   - [facility] referral ( not sent)         "

## 2022-10-03 NOTE — CARE PLAN
The patient is Watcher - Medium risk of patient condition declining or worsening    Shift Goals  Clinical Goals: rest, monitor labs  Patient Goals: rest  Family Goals: N/A    Progress made toward(s) clinical / shift goals:  Patient is scheduled for lab draws this morning, pt resting.     Patient is not progressing towards the following goals: N/A    Patient is AxO x4 and understands plan of care, all questions answered at this time. Personal belongings and call light are within reach, pt calls appropriately for nursing needs, frequent rounding in place.

## 2022-10-03 NOTE — PROGRESS NOTES
3hr HD started @ 1040 and completed @ 1343,tx well tolerated,VSS,net UF = 2300ml.RIJ TDC dressing CDI,report given to AUGUST Charles RN.

## 2022-10-03 NOTE — CARE PLAN
Problem: Knowledge Deficit - Standard  Goal: Patient and family/care givers will demonstrate understanding of plan of care, disease process/condition, diagnostic tests and medications  Outcome: Progressing     Problem: Skin Integrity  Goal: Skin integrity is maintained or improved  Outcome: Progressing     Problem: Fall Risk  Goal: Patient will remain free from falls  Outcome: Progressing     Problem: Pain - Standard  Goal: Alleviation of pain or a reduction in pain to the patient’s comfort goal  Outcome: Progressing   The patient is Stable - Low risk of patient condition declining or worsening    Shift Goals  Clinical Goals: monitor labs, dialysis  Patient Goals: rest  Family Goals: N/A    Progress made toward(s) clinical / shift goals:      Patient is not progressing towards the following goals:    Pt AOx4. Pt denies pain, nausea, and SOB. Pt on clear liquid diet. Dialysis cath CDI. PIV patent SL. Last Bm today. Pt up with standby assist.

## 2022-10-03 NOTE — PROGRESS NOTES
Hospital Medicine Daily Progress Note    Date of Service  10/3/2022    Chief Complaint  Aimee Mauro is a 76 y.o. female admitted 9/25/2022 with hyponatremia    Hospital Course  Aimee Mauro is a 76 y.o. female who presented 9/25/2022 with a previous history of stage IV chronic kidney disease, paroxysmal A. fib, anticoagulation on apixaban.  Patient was originally admitted to [facility] on September 18 after presenting for evaluation of peripheral edema.  At that time she was found to have sodium of 117, and this was new for her.  She was in the hospital until the following day when she left AMA as she had not been able to get any sleep.     Patient was seen as an outpatient for follow-up, and was again found to have low sodium and told to come back to the emergency room so she presented back to [facility] on September 23.  At this time she was noted to have worsening renal function with a creatinine that had gone from 2.7-3.5.  Sodium again was found to be low around 116-117.  She was again admitted and work-up was entertained including a CT of the abdomen pelvis.  This is demonstrated a large uterine mass, with lymphadenopathy highly concerning for metastatic process.  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  The etiology of her worsening renal function is as of yet unclear, as the CT did not demonstrate any hydronephrosis.  She is transferred to [facility] for evaluation by gynecologic and for renal biopsy.    Dr. 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  During the hospitalization, patient's kidney function continued to deteriorate.   After extensive conversation with patient and her family, she is elected to pursue robotic hysterectomy with Dr. Ames and initiate dialysis.    Interval Problem Update  10/3/2022: Patient seen and examined, no acute complaints.  Eager for surgery tomorrow and to go home as soon as possible.    -Vital signs reviewed, normotensive, afebrile  -Labs reviewed, Hgb stable at 8.0  -Na stable at 128, K 4.3, Cr 3.13, Mag 1.9  -Nephrology and Dr. Ames following, appreciate recommendations  -On lasix 80 mg IV daily with minimal urine output  -2000 out via dialysis yesterday  -Plan for 3/3 dialysis today  -Plan for robotic hysterectomy on Tuesday  -Eliquis held    Discharge planning: Planning for home discharge on Wednesday after surgery. Home health accepted and orders placed.     I have discussed this patient's plan of care and discharge plan at IDT rounds today with Case Management, Nursing, Nursing leadership, and other members of the IDT team.    Consultants/Specialty  nephrology and oncological surgery    Code Status  DNAR, I OK    Disposition  Patient is not medically cleared for discharge.   Anticipate discharge to to home with close outpatient follow-up.  I have placed the appropriate orders for post-discharge needs.    Review of Systems  Review of Systems   Constitutional:  Negative for chills and fever.   HENT:  Positive for hearing loss. Negative for sore throat.    Eyes:  Negative for blurred vision and pain.   Respiratory:  Negative for cough and shortness of breath.    Cardiovascular:  Positive for leg swelling. Negative for chest pain and palpitations.   Gastrointestinal:  Negative for abdominal pain and heartburn.   Genitourinary:  Negative for dysuria.   Musculoskeletal:  Negative for back pain.   Neurological:  Positive for weakness. Negative for dizziness and headaches.   Psychiatric/Behavioral:  Negative for depression.    All other systems reviewed and are negative.     Physical Exam  Temp:  [36.9 °C (98.4  °F)-37.8 °C (100.1 °F)] 37 °C (98.6 °F)  Pulse:  [72-89] 86  Resp:  [16] 16  BP: ()/(47-65) 130/65  SpO2:  [92 %-95 %] 92 %    Physical Exam  Constitutional:       General: She is not in acute distress.     Appearance: She is obese.   HENT:      Head: Normocephalic and atraumatic.      Right Ear: External ear normal.      Left Ear: External ear normal.      Nose: Nose normal.      Mouth/Throat:      Mouth: Mucous membranes are moist.      Pharynx: Oropharynx is clear.   Eyes:      General: No scleral icterus.  Cardiovascular:      Rate and Rhythm: Normal rate and regular rhythm.      Pulses: Normal pulses.      Heart sounds: Normal heart sounds. No murmur heard.  Pulmonary:      Effort: Pulmonary effort is normal. No respiratory distress.      Breath sounds: Normal breath sounds.   Abdominal:      General: Abdomen is flat. Bowel sounds are normal.      Palpations: Abdomen is soft.      Tenderness: There is no abdominal tenderness.   Musculoskeletal:      Cervical back: Normal range of motion.      Right lower leg: Edema present.      Left lower leg: Edema present.   Skin:     General: Skin is warm.      Capillary Refill: Capillary refill takes less than 2 seconds.   Neurological:      Mental Status: She is alert and oriented to person, place, and time.   Psychiatric:         Mood and Affect: Mood normal.       Fluids    Intake/Output Summary (Last 24 hours) at 10/3/2022 0745  Last data filed at 10/2/2022 1214  Gross per 24 hour   Intake 500 ml   Output 2000 ml   Net -1500 ml         Laboratory  Recent Labs     10/01/22  0744 10/02/22  0536 10/03/22  0236   WBC 5.4 5.1 5.1   RBC 2.99* 2.98* 2.72*   HEMOGLOBIN 8.8* 8.7* 8.0*   HEMATOCRIT 24.8* 25.3* 23.4*   MCV 82.9 84.9 86.0   MCH 29.4 29.2 29.4   MCHC 35.5* 34.4 34.2   RDW 43.9 45.1 45.0   PLATELETCT 72* 60* 70*   MPV 9.5 10.5 10.4       Recent Labs     09/30/22  0800 10/01/22  0743 10/02/22  0536   SODIUM 125* 126* 128*   POTASSIUM 5.0 5.0 4.5   CHLORIDE 94*  97 96   CO2 22 20 24   GLUCOSE 97 96 107*   BUN 64* 66* 53*   CREATININE 4.09* 3.96* 3.65*   CALCIUM 7.3* 7.5* 7.2*                       Imaging  IR-MELARA,GROSHONG PLACEMENT >5   Final Result      1. Ultrasound and fluoroscopic guided placement of a right internal jugular 14.5 Vietnamese HemoSplit tunneled dialysis catheter.      2. The hemodialysis catheter may be used immediately as clinically indicated. Flushes per protocol.      CT-NEEDLE BX-LYMPH NODE   Final Result      CT-guided retroperitoneal lymph node biopsy.         CT-NEEDLE BX-RENAL   Final Result      1.  CT GUIDED RIGHT RENAL BIOPSY.             Assessment/Plan  * Uterine mass- (present on admission)  Assessment & Plan  Uterine mass with retroperitoneal lymphadenopathy.   is elevated (301).  Per Gyn/Onc, patient will need a uterine biopsy.  Further recommendations per Dr. Ames's team  Patient will undergo robotic hysterectomy with Dr. Ames on Tuesday    Hyperphosphatemia  Assessment & Plan  Phos remains elevated  Started Phoslo  Nephrology following    Normocytic anemia  Assessment & Plan  Hgb stable 7-8   no signs of bleeding  Iron studies indicate anemia of chronic disease  Started oral iron supplementation    Thrombocytopenia (HCC)- (present on admission)  Assessment & Plan  Unknown history or etiology of thrombocytopenia.  Platelets are stable since August 2022.  To stop anticoagulation if platelets drop below 50.     Hypertension- (present on admission)  Assessment & Plan  Stable.  Continue current medication regimen    Paroxysmal atrial fibrillation (HCC)- (present on admission)  Assessment & Plan  On carvedilol.  Rate is controlled.  Hold anticoagulation until biopsies have been completed, last biopsy 9/27 in the AM  Eliquis held for tunneled dialysis catheter placement and surgery Tuesday    Hypocalcemia- (present on admission)  Assessment & Plan  Corrected calcium 8.9     Hyponatremia- (present on admission)  Assessment & Plan  Unclear if  hypovolemia versus SIADH.  Urine osmolality is low.  On 1 L fluid per day fluid restriction.  Nephrology following.  Started 80 mg IV Lasix daily    Acute renal failure superimposed on stage 4 chronic kidney disease (HCC)- (present on admission)  Assessment & Plan  Creatinine is maintaining between 3-4.  Baseline is about 1.7.  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 In review of yesterday's note (10/2/2022), there are no changes except as documented above.

## 2022-10-03 NOTE — PROCEDURES
Nephrology/Hemodialysis note  Patient with SAY, MN, metastatic endometrial cancer, on RRT  Seen and examined during dialysis  Tolerates well  VS stable  Lab results reviewed  Please see dialysis flow sheet for details

## 2022-10-04 NOTE — PROGRESS NOTES
Change in patient condition due to: Other, Syncope . Felt dizzy and lightheaded when getting up to use restroom.   Rapid Response called at: 1905. Patient awake and alert in bed, VSS.   Desire VICKERS and Dr Ames notified at 1905, came to bedside. Received orders for LR infusion and orthostatic blood pressures. Zofran given for nausea. Plan for NPO at 0400 and surgery with Dr Ames 10/4. Patient and family updated on POC.    See Code Blue timeline for rapid response events. Patient Remained on Unit

## 2022-10-04 NOTE — CARE PLAN
The patient is Watcher - Medium risk of patient condition declining or worsening    Shift Goals  Clinical Goals: Monitor vital signs, Prepare for hysterectomy  Patient Goals: Rest  Family Goals: N/A    Patient was updated on plan of care. This RN explained plan of care for the night. Patient was encouraged to drink go-lytely at bedside for surgery. Patient has the bed locked in the lowest poisition with the call light in reach. Patient states she has no pain at this time.    Progress made toward(s) clinical / shift goals:    Problem: Knowledge Deficit - Standard  Goal: Patient and family/care givers will demonstrate understanding of plan of care, disease process/condition, diagnostic tests and medications  Outcome: Progressing     Problem: Fall Risk  Goal: Patient will remain free from falls  Outcome: Progressing     Problem: Pain - Standard  Goal: Alleviation of pain or a reduction in pain to the patient’s comfort goal  Outcome: Progressing       Patient is not progressing towards the following goals:

## 2022-10-04 NOTE — PROGRESS NOTES
Met with patient and her  this evening.  Patient is completely alert and awake and oriented she apparently had a fainting episode earlier while in the bathroom.  However on my visit with her she is fully aware of quite well so she is alert awake oriented.  She is aware that she is scheduled to undergo surgery tomorrow.  She fully understands the nature of the surgery.    I began discussed with both the patient and the  in terms of the goals of the treatment and the risk benefits and rationale of robotic hysterectomy with bilateral salpingo-oophorectomy.  The intent of the surgery is to palliate and mitigate bleeding.  They are in agreement and wished to proceed with the surgery thus we will proceed with surgery tomorrow afternoon.  She will undergo robotic hysterectomy with bilateral salpingo-oophorectomy.    This benefits of rational procedures were reviewed with the patient in detail patient's understanding these risk and wished to proceed with surgery as planned.

## 2022-10-04 NOTE — PROGRESS NOTES
Palliative Care Advance Care Planning Progress Note    General: Nj Ramos is a 76-year-old female with past medical history of CKD stage IV, paroxysmal atrial fibrillation transferred from [facility] [place] for shortness of breath and peripheral edema.  She was found to be in acute renal failure.  Nephrology consulted for profound hyponatremia.  SIADH consideration for low sodium however etiology unclear.  Negative for hydronephrosis or obstruction.  GYN oncology Dr. Jed Ames was consulted.  IR biopsy of lymph node 9/26/2022 indicated metastatic adenocarcinoma consistent with metastatic endometrial cancer.  Patient initially elected to pursue robotic hysterectomy and bilateral salpingo-oophorectomy scheduled for today.  Patient was originally seen by palliative care 10/3/2022    Discussion: Received phone call from patient's son Odilon.  Met with patient's  Willy who is in foyer of [facility]. He confirmed that his wife ended up declining surgery this morning.  She is still down in surgical area.  Confirmed that patient's son Odilon called me.  Willy confirmed it is okay to release information to Odilon.  Willy expressed having a few questions for Dr. Ames including if declining surgery is a reasonable option.  He also inquired if the patient changes her mind at a later time if she would still be able to get surgery.  Discussed that I would reach out to Dr. Ames to ensure his questions are answered.    Willy does not want his wife to go through any treatment she does not wish to endure including surgery.  He is in support of what ever decision she makes regarding continued dialysis versus hospice care.  I discussed role of palliative care is to support patient and family and assist with resources and referrals.  Willy denied having questions or needs at this time.  He was tearful.  Provided therapeutic presence and support.  Willy would like for his wife to be able to rest and he would like to speak with her privately.  I  encouraged him and other family members to call me when they wish to speak further about next steps and goals of care.    Called Odilon.  He confirmed that his mom has canceled surgery he is on board with what ever she wishes to do.  He did have a few questions for Dr. Ames that I was able to answer including information regarding direction potentially causing renal failure.  Shared information regarding my discussion with Willy.  Encourage sleep/other family members to call when they wish to speak further.    Provided therapeutic communication including open-ended questions, therapeutic silence/presence, reflective listening, and validation of thoughts/feelings throughout encounter. Provided palliative care contact information and encouraged family to call with any questions or needs.     Outcome: Patient declined surgery.  Will give patient and family time to process/speak amongst themselves.  They were encouraged to reach out to me when they wish to speak regarding further goals of care.    Updated: Dr. Ames via [email or web address].    Please call our team with questions and/or additional needs.    Total visit time was 47 minutes discussing advance care planning.    NIRU [email or web address].[email or web address].  Palliative Care Nurse Practitioner  [phone]

## 2022-10-04 NOTE — PROGRESS NOTES
[facility] Medicine Daily Progress Note    Date of Service  10/4/2022    Chief Complaint  Aimee Mauro is a 76 y.o. female admitted 9/25/2022 with hyponatremia.  She was seen at [facility] on 9/18/2022 with peripheral edema, and found to have sodium of 117.  She left AMA on 9/20/2022. She was sent back to [facility] on 9/23/2022 by her PCP due to low sodium and acute on chronic kidney disease.  Work-up showed a large uterine mass with lymphadenopathy, concerning for metastatic disease.  Nephrology were consulted for progressive renal failure. She was sent to [facility] for further work-up.  She has stage IV CKD, and paroxysmal atrial fibrillation (on Apixaban), hypertension, CVA, hearing deficit.  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        Hospital Course  Dr. Ames was consulted, and recommended retroperitoneal lymph node biopsy to rule out metastatic disease.   was 301. Patient underwent CT-guided right renal biopsy on 9/26/2022.  She underwent CT-guided retroperitoneal lymph node biopsy on 9/27/2022.  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  Treatments option included robotic hysterectomy, bilateral salpingo-oophorectomy, possible pelvic and periaortic lymph node dissection.    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  Due to progressive renal failure and low urine output, dialysis was started.    Interval Problem Update  Patient underwent hemodialysis today.  Patient decided upon not undergoing any abdominal surgery (hysterectomy, bilateral salpingo-oophorectomy, or possible pelvic and periaortic lymph node dissection).  She wishes to continue hemodialysis.    I have discussed this patient's plan of care and discharge plan at IDT rounds today with Case Management, Nursing, Nursing leadership, and other members of the IDT team.    Consultants/Specialty  nephrology and Gyne/Onc    Code Status  DNAR, I OK    Disposition  Patient is not medically cleared for  discharge.   Anticipate discharge to to home with close outpatient follow-up.  I have placed the appropriate orders for post-discharge needs.    Review of Systems  Review of Systems   Constitutional:  Positive for malaise/fatigue.   HENT: Negative.     Eyes: Negative.    Respiratory: Negative.     Cardiovascular: Negative.    Gastrointestinal: Negative.    Genitourinary: Negative.    Musculoskeletal: Negative.    Skin: Negative.    Neurological: Negative.    Endo/Heme/Allergies: Negative.    Psychiatric/Behavioral: Negative.        Physical Exam  Temp:  [36.1 °C (97 °F)-36.9 °C (98.4 °F)] 36.9 °C (98.4 °F)  Pulse:  [] 88  Resp:  [16-18] 16  BP: ()/(57-98) 133/67  SpO2:  [90 %-96 %] 92 %    Physical Exam  Constitutional:       Appearance: She is ill-appearing.   HENT:      Head: Normocephalic.      Nose: Nose normal.      Mouth/Throat:      Mouth: Mucous membranes are moist.   Eyes:      Pupils: Pupils are equal, round, and reactive to light.   Cardiovascular:      Rate and Rhythm: Normal rate.   Pulmonary:      Effort: Pulmonary effort is normal.   Abdominal:      Palpations: Abdomen is soft.   Musculoskeletal:      Cervical back: Normal range of motion.      Right lower leg: No edema.      Left lower leg: No edema.   Skin:     General: Skin is warm.   Neurological:      General: No focal deficit present.      Mental Status: She is alert.   Psychiatric:         Mood and Affect: Mood normal.       Fluids    Intake/Output Summary (Last 24 hours) at 10/4/2022 1743  Last data filed at 10/4/2022 1118  Gross per 24 hour   Intake 500 ml   Output 2700 ml   Net -2200 ml       Laboratory  Recent Labs     10/02/22  0536 10/03/22  0236 10/04/22  0417   WBC 5.1 5.1 5.3   RBC 2.98* 2.72* 2.59*   HEMOGLOBIN 8.7* 8.0* 7.6*   HEMATOCRIT 25.3* 23.4* 22.5*   MCV 84.9 86.0 86.9   MCH 29.2 29.4 29.3   MCHC 34.4 34.2 33.8   RDW 45.1 45.0 45.2   PLATELETCT 60* 70* 77*   MPV 10.5 10.4 9.8     Recent Labs     10/03/22  1045  10/04/22  0417 10/04/22  1401   SODIUM 128* 128* 130*   POTASSIUM 4.3 4.0 3.9   CHLORIDE 95* 96 95*   CO2 27 27 28   GLUCOSE 117* 98 117*   BUN 36* 22 11   CREATININE 3.13* 2.40* 1.90*   CALCIUM 7.0* 7.0* 7.1*                   Imaging  IR-MELARA,GROSHONG PLACEMENT >5   Final Result      1. Ultrasound and fluoroscopic guided placement of a right internal jugular 14.5 [place]altese HemoSplit tunneled dialysis catheter.      2. The hemodialysis catheter may be used immediately as clinically indicated. Flushes per protocol.      CT-NEEDLE BX-LYMPH NODE   Final Result      CT-guided retroperitoneal lymph node biopsy.         CT-NEEDLE BX-RENAL   Final Result      1.  CT GUIDED RIGHT RENAL BIOPSY.           Assessment/Plan  * Uterine mass- (present on admission)  Assessment & Plan   was 301. Patient underwent CT-guided right renal biopsy on 9/26/2022.  She underwent CT-guided retroperitoneal lymph node biopsy on 9/27/2022.  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  Treatments option included robotic hysterectomy, bilateral salpingo-oophorectomy, possible pelvic and periaortic lymph node dissection.  Patient has opted to not undergo any surgery.  She wishes to continue hemodialysis.    Acute renal failure superimposed on stage 4 chronic kidney disease (HCC)- (present on admission)  Assessment & Plan  Patient underwent hemodialysis today.  Creatinine is 1.9 today, baseline is about 1.7.  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 Dialysis was started 10/1/22    Hyperphosphatemia  Assessment & Plan  Undergoing hemodialysis.  Nephrology following    Normocytic anemia  Assessment & Plan  Hgb stable 7-8. No signs of bleeding. Iron studies indicate anemia of chronic disease.  Continue oral iron  supplementation    Hypocalcemia- (present on admission)  Assessment & Plan  Corrected calcium is normal.     Hyponatremia- (present on admission)  Assessment & Plan  Nephrology following.  Unclear if hypovolemia versus SIADH.  Urine osmolality has been low.  On 1 L fluid per day fluid restriction.  Nephrology following.  On 80 mg IV Lasix daily    Bilateral leg swelling- (present on admission)  Assessment & Plan  Continue Lasix 80 mg daily.    Thrombocytopenia (HCC)- (present on admission)  Assessment & Plan  Unknown history or etiology of thrombocytopenia.  Platelets are stable since August 2022.  To stop anticoagulation if platelets drop below 50.     Hypertension- (present on admission)  Assessment & Plan  Stable.  Continue current medication regimen    Paroxysmal atrial fibrillation (HCC)- (present on admission)  Assessment & Plan  On carvedilol.  Rate is controlled. Eliquis was held for tunneled dialysis catheter placement       VTE prophylaxis: SCDs/TEDs

## 2022-10-04 NOTE — PROCEDURES
Nephrology/Hemodialysis note    Patient with SAY due to MN secondary to metastatic endometrial cancer -on RRT  Seem and examined during dialysis -tolerates well  VS stable  UF 2-3 L  Lab results reviewed  Please see dialysis flow sheet for details  HD schedule TTS

## 2022-10-04 NOTE — CARE PLAN
The patient is Watcher - Medium risk of patient condition declining or worsening    Shift Goals  Clinical Goals: Monitor VS, orthostatic blood pressures  Patient Goals: Rest  Family Goals: N/A    Progress made toward(s) clinical / shift goals:    Problem: Knowledge Deficit - Standard  Goal: Patient and family/care givers will demonstrate understanding of plan of care, disease process/condition, diagnostic tests and medications  Outcome: Progressing  Note: Pt updated on plan of care this morning, pt stated a change in decision for surgery. Pt stated she no longer wanted to go through with the surgery, Dr. Ames was notified and surgery was cancelled.      Problem: Skin Integrity  Goal: Skin integrity is maintained or improved  Outcome: Progressing  Note: Pt able to turn self from side to side, pillows in use for support/positioning, bed linens changed to keep pt dry.      Problem: Fall Risk  Goal: Patient will remain free from falls  Outcome: Progressing  Note: Call light within reach, bed locked and in lowest position, non-slip socks on, personal belongings within reach, room close to nursing station, family at bedside.      Problem: Pain - Standard  Goal: Alleviation of pain or a reduction in pain to the patient’s comfort goal  Outcome: Progressing  Note: Pain assessed using 0-10 verbal pain scale, pt denies any pain at this time.        Patient is not progressing towards the following goals: N/A

## 2022-10-04 NOTE — PROGRESS NOTES
HOSPITALIST CROSS COVER    Rapid response called by RN due to patient experiencing a syncopal event. The event was witnessed by the patient's . RN reports that the patient was sitting up at bedside with family in room, and appeared to pass out. Her  laid her back down onto the bed.  The patient quickly regained consciousness. On my arrival, the patient is alert and oriented. She does not recall passing out, denies dizziness, lightheadedness, chest pain or palpitations. She reports feeling nauseated, but denies other symptoms.  Blood pressure on my arrival was 101/64, but dropped to 93/61 in a short period.  The patient underwent dialysis today with 2 L removed. She is also currently taking in GoLytely and has had quite a bit of diarrhea over the last hour. She is not currently on fluids due to her poor kidney function. She is able to eat and drink, but has not done much of either today.   Syncope is likely due to hypotension from rapid fluid loss from diarrhea poor oral intake.    During the rapid, Dr. Ames, the general surgeon, came and spoke with the patient regarding hysterectomy that she will undergo tomorrow.  Patient stated understanding of the conversation. I updated the patient's  on the plan of care, that we will monitor her BP while she is NPO overnight and taking the GoLytely, and will provide some gentle hydration for now.    VS: HR 72, RR 18, BP 93/61/ SpO2 90% on RA    A/P  #Syncope   #Hypotension  - 500 mL LR bolus  - 50 ml/hr continuous IV fluids overnight  - Orthostatic blood pressures every shift  - Zofran as needed

## 2022-10-04 NOTE — PROGRESS NOTES
This RN received in report that the pt had changed her mind about wanting to undergo surgery. This RN notified Dr. Ames on the pt's decision. This is an ongoing discussion with pt and her .

## 2022-10-04 NOTE — PROGRESS NOTES
Rapid response called at 1905. Bedside report received from KEVIN De La Cruz. Dorothy and this RN present at bedside. Patient had a syncopal episode while waiting at the edge of bed for the restroom. New orders received for continuous fluids, a 500mL bolus of LR, and NPO at 0400. Patient medicated for nausea per MAR.

## 2022-10-04 NOTE — CONSULTS
Reason for Palliative Care Consult: Advance Care Planning    Consulted by: Ashley VICKERS    HPI: Aimee Mauro is a 76-year-old female with past medical history of CKD stage IV, paroxysmal atrial fibrillation transferred to [facility] from [facility] 9/25/2022 for shortness of breath and peripheral edema.  Found to be hyponatremic.  Patient was hospitalized 9/18/2022 for weakness and shortness of breath with hyponatremia.  Nephrology consulted at that time and patient underwent fluid resuscitation however patient left AMA.  She did follow-up with outpatient urology and due to hyponatremia was instructed to return to the emergency department.  Patient presented back to [facility] 9/23.  CT of abdomen and pelvis demonstrated large uterine mass with lymphadenopathy highly concerning for metastatic disease.  Nephrology again consulted for hyponatremia.  SIADH consideration for low sodium however etiology is unclear.  Negative for hydronephrosis.  GYN oncology Dr. Jed Ames was consulted.  IR directed biopsy of lymph node completed 9/26/2022 indicating metastatic adenocarcinoma consistent with metastatic endometrial cancer.  Patient's renal function has continued to deteriorate requiring hemodialysis.  She is elected to pursue robotic hysterectomy with Dr. Ames scheduled for tomorrow.    Additional consults:   Nephrology  GYN oncology    Assessment:  Neuro: Awake and oriented x4.   Dyspnea: Yes  Last BM: 10/03/22  Pain: No    Depression: No   Dementia: No      Living situation & psychosocial: Patient lives with her  Stephen in [facility].  There are 2 adult sons and grandson are at bedside.  Patient reports having a large family and social support.  She does not smoke.    Spiritual:  Is Lutheran or spirituality important for coping with this illness? No    Has a  or spiritual provider visit been requested? No    Palliative Performance Scale: 40%    Healthcare Directive Information:  Advance  "Directive: None    DPOA: None   POLST: None    Code Status: DNAR/I OK per nephrology discussion with patient/family 9/30/2022    Discussion: Discussed patient with Dr. Ames who is okay with palliative care consultation.    Met with patient and patient's family at patient's bedside. Introduced myself and explained the role of palliative care.  Patient denies having uncontrolled symptoms at this time.  Patient's  expressed \"she has been in the hospital for 10 days.\"  Son inquired about potential recovery considering \"this is an outpatient procedure usually, but we understand this more complicated.\"  Discussed that given the patient's advanced age and renal failure unsure of estimated recovery period.  Discussed that the surgeon will be able to explain further about recovery prior to and even more so following surgery based on what is found.  Discussed that renal function will be monitored daily.  Family confirmed that nephrologist Dr. Hernandez spent approximately an hour discussing implications and goals of care moving forward.    Patient is having a difficult time taking bowel prep.  Message left for kitchen to see if they have any flavoring packets.  Discussed that family can obtain crystallite or other flavoring packets to make bowel prep more palatable.  Spoke with nurse and confirm they are inquiring when patient has to complete prep.  Discussed that I would follow patient's clinical course and I am available to discuss goals of care at any time or assist with healthcare directives.  Patient family denied having other questions at this time.    Provided therapeutic communication including open-ended questions, therapeutic presence/silence, and reflective listening throughout encounter. Provided palliative care contact information and encouraged patient/family to call with any questions or needs.     Outcome: Supportive visit.    Plan: Patient pursuing robotic hysterectomy and continued hemodialysis.  Continue " DNAR, I OK.      Thank you for allowing Palliative Care to participate in this patient's care. Please call our team with questions and/or additional needs.    Total visit time was 20 minutes discussing advance care planning.     LEOLA Ugarte.  Palliative Care Nurse Practitioner  [phone]

## 2022-10-04 NOTE — PROGRESS NOTES
3hr HD started @ 1817 and completed @ 1118,tx well tolerated,VSS,net UF = 2200ml.RIJ TDC dressing CDI,report given to Deana Gordon RN.

## 2022-10-04 NOTE — DISCHARGE PLANNING
"TCN following. HTH/SCP chart review completed.     Mbr is S/P CT-guided Right Renal Biopsy on 9/26/2022;   S/p TDC placement 10/01/2022 ;   Mbr on Hemodialysis. Nephrology Medicine following mbr.     PT noted on 9/28/2022 mbr mobilizing at PLOF baseline.     Per chart review, Gynecology Oncology  Medicine following mbr secondary to \" Endometrial cancer: Hypoechoic uterine mass measuring about 5.3 x 4.5 x 4.5 cm on US and CT with retroperitoneal lymphadenopathy. Biopsy of periaortic lymph node positive for endometrioid endometrial adenocarcinoma..\"  Plans for possible \"robotic hysterectomy with bilateral salpingo-oophorectomy\" per Dr Ames notes 10/03/2022.       Mbr is currently not medically cleared for DC with plans for surgery  And DC to home with possible HH services, outpatient Dialysis with [facility] .    [facility] Accepted on 9/30/2022.    Mbr currently has Low LACE score of 12 as of 10/4/2022. Therefore, [facility] referral will not be sent at this time.   TCN will continue to follow and collaborate with discharge planning team as additional post acute needs arise. Thank you.     Completed today:  Request and  obtained order for HH   Previously completed:  - Choice forms: HH   - [facility] referral ( not sent)  "

## 2022-10-05 PROBLEM — Z71.89 GOALS OF CARE, COUNSELING/DISCUSSION: Status: ACTIVE | Noted: 2022-01-01

## 2022-10-05 NOTE — DISCHARGE PLANNING
TCN following. HTH/SCP chart review completed.   Collaborated with Primary JAYESH Jean and EMRE Garcias.   Mbr is S/P CT-guided Right Renal Biopsy on 9/26/2022;   S/p TDC placement 10/01/2022 ;   Mbr on Hemodialysis. Nephrology Medicine following mbr  Gynecology Oncology Medicine following mbr . Dr Ames noted on 10/4/2022 that mbr elected not to proceed with proposed surgery. Gynecology Oncology signed off 10/4/2022.       PT previously noted on 9/28/2022  mbr is mobilizing at [facility] accepted mbr on 9/30/2022      Mbr is currently not medically cleared for DC 10/5/2022  anticipated  DC to home when medically cleared with possible HH services, [facility] and outpatient Dialysis with [facility] .    TCN will continue to follow and collaborate with discharge planning team as additional post acute needs arise. Thank you.    Previously completed:  -Request and  obtained order for HH   - Choice forms: HH   - [facility] referral ( not sent) LOW LACE of 12  today 10/5

## 2022-10-05 NOTE — DIETARY
Nutrition Services Brief Update:    Day 10 of admit.  Aimee Mauro is a 76 y.o. female with admitting DX of Endometrial mass [N94.89]  Uterine mass [N85.8]    Current Diet: Renal with supplements     Problem: Nutritional:  Goal: Advance diet beyond NPO/CLD and achieve adequate nutritional intake  Description: Patient will consume >50% of meals  Outcome: Progressing     Diet was progressed to a renal diet yesterday. No meals recorded yet. Vanilla Boost added yesterday per nutrition rep per pt request. Please document meals to assess PO intake.     RD following.

## 2022-10-05 NOTE — CARE PLAN
The patient is Watcher - Medium risk of patient condition declining or worsening    Shift Goals  Clinical Goals: Monitor BP, rest  Patient Goals: Rest  Family Goals: N/A    Progress made toward(s) clinical / shift goals:    Problem: Knowledge Deficit - Standard  Goal: Patient and family/care givers will demonstrate understanding of plan of care, disease process/condition, diagnostic tests and medications  Outcome: Progressing  Note: Pt and family updated on plan of care, stated understanding.  and son expressed importance of family meeting that took place.      Problem: Skin Integrity  Goal: Skin integrity is maintained or improved  Outcome: Progressing  Note: Pt able to turn self from side to side. Skin assessed.  Pt showered and sheets were changed to keep pt dry.      Problem: Fall Risk  Goal: Patient will remain free from falls  Outcome: Progressing  Note: Call light within reach, bed locked an in lowest position, non-slip socks on, room close to nursing station, personal belongings within reach. Family at bedside.      Problem: Pain - Standard  Goal: Alleviation of pain or a reduction in pain to the patient’s comfort goal  Outcome: Progressing  Note: Pain assessed using 0-10 verbal pain scale, pt denies any pain at this time.        Patient is not progressing towards the following goals:N/A

## 2022-10-05 NOTE — ASSESSMENT & PLAN NOTE
Patient has stage IV endometrial adenocarcinoma.  She was offered robotic hysterectomy, bilateral salpingo-oophorectomy, possible pelvic and periaortic lymph node dissection.  Patient decided not to proceed with surgery.  She has renal failure requiring hemodialysis.  There will be a family meeting today to decide upon goals of care.

## 2022-10-05 NOTE — PROGRESS NOTES
[facility] Medicine Daily Progress Note    Date of Service  10/5/2022    Chief Complaint  Aimee Mauro is a 76 y.o. female admitted 9/25/2022 with hyponatremia.  She was seen at [facility] on 9/18/2022 with peripheral edema, and found to have sodium of 117.  She left AMA on 9/20/2022. She was sent back to [facility] on 9/23/2022 by her PCP due to low sodium and acute on chronic kidney disease.  Work-up showed a large uterine mass with lymphadenopathy, concerning for metastatic disease.  Nephrology were consulted for progressive renal failure. She was sent to [facility] for further work-up.  She has stage IV CKD, and paroxysmal atrial fibrillation (on Apixaban), hypertension, CVA, hearing deficit.  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        Hospital Course  Dr. Ames was consulted, and recommended retroperitoneal lymph node biopsy to rule out metastatic disease.   was 301. Patient underwent CT-guided right renal biopsy on 9/26/2022.  She underwent CT-guided retroperitoneal lymph node biopsy on 9/27/2022.  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  Treatments option included robotic hysterectomy, bilateral salpingo-oophorectomy, possible pelvic and periaortic lymph node dissection.    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  Due to progressive renal failure and low urine output, dialysis was started.    On 10/4/2022, patient decided not to undergo any abdominal surgery (hysterectomy, bilateral salpingo-oophorectomy, or possible pelvic and periaortic lymph node dissection).  She wishes to continue hemodialysis.    Interval Problem Update  Denies pain.  Afebrile, hemodynamically stable.  Creatinine is 2.31.  There will be a family meeting today to discuss goals of care, and if to proceed with hemodialysis.    I have discussed this patient's plan of care and discharge plan at IDT rounds today with Case Management, Nursing, Nursing leadership, and other members of the IDT  team.    Consultants/Specialty  nephrology and Gyne/Onc    Code Status  DNAR, I OK    Disposition  Patient is not medically cleared for discharge.   Anticipate discharge to to home with close outpatient follow-up.  I have placed the appropriate orders for post-discharge needs.    Review of Systems  Review of Systems   Constitutional:  Positive for malaise/fatigue.   HENT: Negative.     Eyes: Negative.    Respiratory: Negative.     Cardiovascular: Negative.    Gastrointestinal: Negative.    Genitourinary: Negative.    Musculoskeletal: Negative.    Skin: Negative.    Neurological: Negative.    Endo/Heme/Allergies: Negative.    Psychiatric/Behavioral: Negative.        Physical Exam  Temp:  [36.8 °C (98.3 °F)-37.4 °C (99.3 °F)] 36.8 °C (98.3 °F)  Pulse:  [69-88] 70  Resp:  [16] 16  BP: ()/(60-69) 114/65  SpO2:  [92 %-95 %] 95 %    Physical Exam  Constitutional:       Appearance: She is ill-appearing.   HENT:      Head: Normocephalic.      Nose: Nose normal.      Mouth/Throat:      Mouth: Mucous membranes are moist.   Eyes:      Pupils: Pupils are equal, round, and reactive to light.   Cardiovascular:      Rate and Rhythm: Normal rate.   Pulmonary:      Effort: Pulmonary effort is normal.   Abdominal:      Palpations: Abdomen is soft.   Musculoskeletal:      Cervical back: Normal range of motion.      Right lower leg: No edema.      Left lower leg: No edema.   Skin:     General: Skin is warm.   Neurological:      General: No focal deficit present.      Mental Status: She is alert.   Psychiatric:         Mood and Affect: Mood normal.       Fluids    Intake/Output Summary (Last 24 hours) at 10/5/2022 0945  Last data filed at 10/5/2022 0600  Gross per 24 hour   Intake 500 ml   Output 3900 ml   Net -3400 ml       Laboratory  Recent Labs     10/03/22  0236 10/04/22  0417   WBC 5.1 5.3   RBC 2.72* 2.59*   HEMOGLOBIN 8.0* 7.6*   HEMATOCRIT 23.4* 22.5*   MCV 86.0 86.9   MCH 29.4 29.3   MCHC 34.2 33.8   RDW 45.0 45.2    PLATELETCT 70* 77*   MPV 10.4 9.8     Recent Labs     10/04/22  0417 10/04/22  1401 10/05/22  0038   SODIUM 128* 130* 132*   POTASSIUM 4.0 3.9 4.2   CHLORIDE 96 95* 96   CO2 27 28 31   GLUCOSE 98 117* 95   BUN 22 11 14   CREATININE 2.40* 1.90* 2.31*   CALCIUM 7.0* 7.1* 7.2*                   Imaging  IR-MELARA,GROSHONG PLACEMENT >5   Final Result      1. Ultrasound and fluoroscopic guided placement of a right internal jugular 14.5 Citizen of [place] HemoSplit tunneled dialysis catheter.      2. The hemodialysis catheter may be used immediately as clinically indicated. Flushes per protocol.      CT-NEEDLE BX-LYMPH NODE   Final Result      CT-guided retroperitoneal lymph node biopsy.         CT-NEEDLE BX-RENAL   Final Result      1.  CT GUIDED RIGHT RENAL BIOPSY.           Assessment/Plan  * Uterine mass- (present on admission)  Assessment & Plan   was 301. Patient underwent CT-guided right renal biopsy on 9/26/2022.  She underwent CT-guided retroperitoneal lymph node biopsy on 9/27/2022.  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  Treatments option included robotic hysterectomy, bilateral salpingo-oophorectomy, possible pelvic and periaortic lymph node dissection.  Patient has opted to not undergo any surgery.  She wishes to continue hemodialysis.    Acute renal failure superimposed on stage 4 chronic kidney disease (HCC)- (present on admission)  Assessment & Plan  Patient underwent hemodialysis today.  Creatinine is 1.9 today, baseline is about 1.7.  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 Dialysis was started 10/1/22    Goals of care, counseling/discussion  Assessment & Plan  Patient has stage IV endometrial adenocarcinoma.  She was offered robotic hysterectomy, bilateral  salpingo-oophorectomy, possible pelvic and periaortic lymph node dissection.  Patient decided not to proceed with surgery.  She has renal failure requiring hemodialysis.  There will be a family meeting today to decide upon goals of care.    Hyperphosphatemia  Assessment & Plan  Undergoing hemodialysis.  Nephrology following    Normocytic anemia  Assessment & Plan  Hgb stable 7-8. No signs of bleeding. Iron studies indicate anemia of chronic disease.  Continue oral iron supplementation    Hypocalcemia- (present on admission)  Assessment & Plan  Corrected calcium is normal.     Hyponatremia- (present on admission)  Assessment & Plan  Nephrology following.  Unclear if hypovolemia versus SIADH.  Urine osmolality has been low.  On 1 L fluid per day fluid restriction.  Nephrology following.  On 80 mg IV Lasix daily    Bilateral leg swelling- (present on admission)  Assessment & Plan  Continue Lasix 80 mg daily.    Thrombocytopenia (HCC)- (present on admission)  Assessment & Plan  Unknown history or etiology of thrombocytopenia.  Platelets are stable since August 2022.  To stop anticoagulation if platelets drop below 50.     Hypertension- (present on admission)  Assessment & Plan  Stable.  Continue current medication regimen    Paroxysmal atrial fibrillation (HCC)- (present on admission)  Assessment & Plan  On carvedilol.  Rate is controlled. Eliquis was held for tunneled dialysis catheter placement       VTE prophylaxis: SCDs/TEDs

## 2022-10-05 NOTE — PROGRESS NOTES
Palliative Care Advance Care Planning Progress Note    General: Gab Mauro is a 76-year-old female with past medical history of CKD stage IV paroxysmal atrial fibrillation transferred from [facility] to [facility] 9/25/2022 for shortness of breath and peripheral edema.  Work-up revealed acute kidney injury without obstruction and IR biopsy confirmed adenocarcinoma consistent with metastatic endometrial cancer.  Nephrology consulted as well as GYN oncology.  Patient originally was going to pursue robotic hysterectomy scheduled for 10/4 however she refused surgery.  Patient was originally seen by palliative care 10/3/2022 for advance care planning.    Discussion: Notified by TATIANA Allison that family wish to speak with me.  Met patient's  and one of their sons in the family room.  Patient's son had all other pertinent family on Zoom.  Introduced myself and discussed role of palliative care.  They are aware and in support of their mother not pursuing surgery or oncologic treatment for her uterine mass.  They confirmed that patient wishes to pursue hemodialysis at this point.  They inquired what that might look like outside the hospital.  I provided an overview to the best of my ability of what care will look like/be provided via [facility] and [facility] outpatient dialysis.    I inquired if they would like information regarding hospice care for the future and they were in agreement.  Provided overview of hospice care including what is, and does not provided.  Discussed when to consider hospice if burden of transportation and monitoring for hemodialysis becomes too burdensome for patient.  Confirmed I would clarify her goals of care and assist with POLST form.  All questions answered.  Directly following meeting patient was having ADL needs met by CNA.    Circled back with patient and  shortly and discuss goals of care with patient.  She confirms she does not wish to pursue surgery or oncologic treatment  however does wish to pursue outpatient hemodialysis for a time.  She inquired about the right diet.  Encouraged them to speak with Jose regarding renal diet, however encourage patient to enjoy food when she is hungry considering she may have limited time.  If food is something she enjoys I encouraged her to eat what she wants.  Patient is asking for toilet riser and shower chair.  She does not feel she needs a hospital bed at this time.  Confirmed I would update physician for appropriate orders.  Discussed that patient can take toilet riser and bathroom home with her.    Patient confirmed wishes for DNR/DNI.  Reviewed POLST form in detail.  Patient confirms she would not want CPR and would want a natural death.  She had first expressed she would want comfort focused treatment but with dialysis.  After some clarification with her and her  she confirms she would want selective treatment.  Discussed that form would only be used if patient were unable to make decisions.  Discussed that if patient wishes to transition to hospice care she can discuss this with her home health team and her outpatient physician.  Discussed that they can always call for assistance as well.  Patient would be okay with IV fluids.    Provided therapeutic communication including open-ended questions, therapeutic presence/silence, reflective listening, and validation of thoughts/feelings of both patient and family throughout encounter. Provided palliative care contact information and encouraged patient/family to call with any questions or needs.     Outcome: CODE STATUS changed to DNR/DNI. POLST form complete and scanned into EMR.  Original placed above patient's bed.  Please ensure patient is discharged with POLST form.  Instructions given to place on refrigerator once home. To locate POLST in EMR, please hover cursor over the patient's code status to find all linked ACP documents.    Plan: Discharge home with [facility]  and outpatient dialysis with [facility] scheduled for 5 AM starting Friday and then Monday, Wednesday, and Friday at 5:30 AM.    Updated: Dr. Quinones.     Please call our team with questions and/or additional needs.    Total visit time was 50 minutes discussing advance care planning.    LEOLA Ugarte.  Palliative Care Nurse Practitioner  [phone]

## 2022-10-05 NOTE — PROGRESS NOTES
Patient appears to be in good spirit  at bedside.  Patient has elected not to proceed with surgery.  Detailed discussion with the patient and her  and the son regarding her decision.  Certainly I respect the patient's decision of not proceeding with surgery.  Liver patient still wants to continue on with dialysis.  I would defer this to the nephrology team.  It is of my opinion that the patient does not wish to proceed with surgery and treatment for her cancer hospice and stopping dialysis.  I did inform them that continuing on with dialysis without a hysterectomy will lead to patient's progression of disease from her endometrial cancer that ultimately will result most likely vaginal bleeding possible vesicle vaginal fistula and hygienic issues.  At this point in time given the fact the patient does not wish treatment for her endometrial cancer we will sign off certainly be happy to reconsult with her if need be.

## 2022-10-05 NOTE — PROGRESS NOTES
Nephrology Daily Progress Note    Date of Service  10/5/2022    Chief Complaint  76 y.o. female with no known medical history other than shortness of breath and lower extremity swelling that started in August 2022 with multiple admissions in August and September 2022 admitted 9/25/2022 with shortness of breath and hyponatremia.    The patient was admitted 3 times in September 2022, all with shortness of breath.  She was found to have normal cardiac function, but found to have elevated serum creatinine, and presumed to have CKD.  As part of her nephrology work-up, the patient was noted to have nephrotic range proteinuria, and a mildly elevated ASHKAN.  On imaging, the patient was found to have an endometrial mass, further imaging revealed retroperitoneal lymphadenopathy and possible adrenal mass.  The patient was transferred to [facility] on 9/25/2022 for gynecology oncology consultation and consideration of endometrial biopsy.      Interval Problem Update  9/26 -on my evaluation today, the patient complains of ongoing leg swelling, shortness of breath, and poor appetite.  She denies headache, nausea, vomiting.  The patient says she is leaning toward comfort focused measures, especially if she is diagnosed with cancer.  9/27 -patient complains of ongoing weakness and shortness of breath.  She says she is not bothered much by her lower extremity edema.  She denies chest pain, headache, nausea, vomiting, but still has a poor appetite.  She continues to say that she would not want any aggressive treatment if she is diagnosed with metastatic cancer.  9/28-patient denies uremic symptoms, chest pain, shortness of breath.  Only 300 mL of urine output documented in the last 24 hours.  9/29-3 urine voids recorded in the last 24 hours, amount not recorded.  Patient thinks her appetite is a little bit better.  Denies chest pain, shortness of breath, but does still feel fatigued and weak.  I had a very long conversation,  together with primary hospitalist nurse practitioner, and Dr. Ames with the patient and the patient's family about options moving forward.  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  The patient was presented with options of laparoscopy, hysterectomy, dialysis, chemotherapy, radiation, and different combinations of those options pending further progress and evaluations.  The patient was also presented with the option of hospice.  10/5 -doing better, completed daily dialysis x 4 -tolerated well  On TTS schedule  Pedal edema improving    Review of Systems  Review of Systems   Constitutional:  Negative for chills, fever, malaise/fatigue and weight loss.   HENT:  Negative for congestion, hearing loss and sinus pain.    Eyes: Negative.    Respiratory:  Negative for cough, hemoptysis, shortness of breath and wheezing.    Cardiovascular:  Positive for leg swelling. Negative for chest pain, palpitations and orthopnea.   Gastrointestinal:  Negative for abdominal pain, nausea and vomiting.   Skin: Negative.    All other systems reviewed and are negative.     Physical Exam  Temp:  [36.8 °C (98.3 °F)-37.4 °C (99.3 °F)] 36.8 °C (98.3 °F)  Pulse:  [69-88] 70  Resp:  [16] 16  BP: ()/(60-69) 114/65  SpO2:  [92 %-95 %] 95 %    Physical Exam  Vitals and nursing note reviewed.   Constitutional:       General: She is not in acute distress.     Appearance: She is well-developed. She is ill-appearing.   HENT:      Head: Normocephalic and atraumatic.      Nose: Nose normal.      Mouth/Throat:      Mouth: Mucous membranes are moist.      Pharynx: Oropharynx is clear.   Eyes:      Conjunctiva/sclera: Conjunctivae normal.      Pupils: Pupils are equal, round, and reactive to light.   Neck:      Thyroid: No thyromegaly.   Cardiovascular:      Rate and Rhythm: Normal rate and regular rhythm.      Pulses: Normal pulses.      Heart sounds: Normal heart sounds.     No friction rub. No gallop.    Pulmonary:      Effort: Pulmonary effort is normal. No respiratory distress.      Breath sounds: Normal breath sounds. No wheezing, rhonchi or rales.   Abdominal:      General: Bowel sounds are normal. There is no distension.      Palpations: Abdomen is soft. There is no mass.      Tenderness: There is no abdominal tenderness. There is no guarding.   Musculoskeletal:      Cervical back: Normal range of motion and neck supple.      Right lower leg: Edema present.      Left lower leg: Edema present.   Skin:     General: Skin is warm.      Findings: No erythema or rash.   Neurological:      General: No focal deficit present.      Mental Status: She is alert and oriented to person, place, and time.   Psychiatric:         Mood and Affect: Mood normal.         Behavior: Behavior normal.         Thought Content: Thought content normal.         Judgment: Judgment normal.       Fluids    Intake/Output Summary (Last 24 hours) at 10/5/2022 1247  Last data filed at 10/5/2022 0600  Gross per 24 hour   Intake --   Output 1200 ml   Net -1200 ml         Laboratory  Labs reviewed, pertinent labs below.  Recent Labs     10/03/22  0236 10/04/22  0417   WBC 5.1 5.3   RBC 2.72* 2.59*   HEMOGLOBIN 8.0* 7.6*   HEMATOCRIT 23.4* 22.5*   MCV 86.0 86.9   MCH 29.4 29.3   MCHC 34.2 33.8   RDW 45.0 45.2   PLATELETCT 70* 77*   MPV 10.4 9.8       Recent Labs     10/04/22  0417 10/04/22  1401 10/05/22  0038   SODIUM 128* 130* 132*   POTASSIUM 4.0 3.9 4.2   CHLORIDE 96 95* 96   CO2 27 28 31   GLUCOSE 98 117* 95   BUN 22 11 14   CREATININE 2.40* 1.90* 2.31*   CALCIUM 7.0* 7.1* 7.2*                   URINALYSIS:  Lab Results   Component Value Date/Time    COLORURINE Yellow 09/25/2022 1439    CLARITY Clear 09/25/2022 1439    SPECGRAVITY 1.025 09/25/2022 1439    PHURINE 6.5 09/25/2022 1439    KETONES Negative 09/25/2022 1439    PROTEINURIN >=300 (A) 09/25/2022 1439    BILIRUBINUR Negative 09/25/2022 1439    UROBILU 0.2 09/25/2022 1439    NITRITE  Negative 09/25/2022 1439    LEUKESTERAS Trace (A) 09/25/2022 1439    OCCULTBLOOD Large (A) 09/25/2022 1439     [facility]  Lab Results   Component Value Date/Time    TOTPROTUR >600.0 (H) 09/24/2022 1509      Lab Results   Component Value Date/Time    CREATININEU 124.83 09/25/2022 1439         Imaging interpreted by radiologist. Imaging reports reviewed with pertinent findings below  IR-NORA MELARA PLACEMENT >5   Final Result      1. Ultrasound and fluoroscopic guided placement of a right internal jugular 14.5 Mauritian HemoSplit tunneled dialysis catheter.      2. The hemodialysis catheter may be used immediately as clinically indicated. Flushes per protocol.      CT-NEEDLE BX-LYMPH NODE   Final Result      CT-guided retroperitoneal lymph node biopsy.         CT-NEEDLE BX-RENAL   Final Result      1.  CT GUIDED RIGHT RENAL BIOPSY.            Current Facility-Administered Medications   Medication Dose Route Frequency Provider Last Rate Last Admin    metoprolol tartrate (LOPRESSOR) tablet 25 mg  25 mg Oral TWICE DAILY Jeremias Munoz D.O.   25 mg at 10/04/22 1752    heparin injection 3,200 Units  3,200 Units Intracatheter DIALYSIS PRN Anton Hernandez M.D.   3,200 Units at 10/04/22 1121    NS (BOLUS) infusion 250 mL  250 mL Intravenous DIALYSIS PRN Anton Hernandez M.D.        furosemide (LASIX) injection 80 mg  80 mg Intravenous Q DAY Ashley Cook A.P.R.N.   80 mg at 10/05/22 0607    calcium acetate (PHOS-LO) 667 MG tablet 667 mg  667 mg Oral TID  Ashley Cook A.P.R.N.   667 mg at 10/05/22 1158    ferrous sulfate tablet 325 mg  325 mg Oral QDAY with Breakfast Ashley Cook A.P.R.N.   325 mg at 10/05/22 0901    apixaban (ELIQUIS) tablet 5 mg  5 mg Oral BID Ashley Cook A.P.R.N.   5 mg at 09/30/22 0606    acetaminophen (Tylenol) tablet 650 mg  650 mg Oral Q6HRS PRN Pauline Torres M.D.        ondansetron (ZOFRAN ODT) dispertab 4 mg  4 mg Oral Q4HRS PRN Pauline Torres M.D.   4 mg at 09/25/22 2144     ondansetron (ZOFRAN) syringe/vial injection 4 mg  4 mg Intravenous Q4HRS PRN Pauline Torres M.D.   4 mg at 10/03/22 1919    traZODone (DESYREL) tablet 100 mg  100 mg Oral Nightly KWABENA Moncada.D.   100 mg at 10/04/22 2115    vitamin D3 (cholecalciferol) tablet 1,000 Units  1,000 Units Oral DAILY Pauline Torres M.D.   1,000 Units at 10/05/22 0608     Kidney biopsy [facility]26/22 - [facility] results as below.  Diagnosis membranous glomerulopathy.  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  Mild chronicity (10% IFTA)  Immunostaining for primary membranous negative  There is segmental vascular congestion which raises possibility of underlying renal vein thrombosis      Assessment/Plan  76 y.o. female with no known medical history other than shortness of breath and lower extremity swelling that started in August 2022 with multiple admissions in August and September 2022, large uterine mass with retroperitoneal lymphadenopathy admitted 9/25/2022 with shortness of breath and hyponatremia.    1.  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    On HD -on TTS schedule    2.  Hyponatremia: improving -to monitor    3.  Hypertension, controlled,     4.  Metabolic acidosis -corrected    5.  Anemia of chronic disease, worsening Hb level -Epogen with HD      6.  Goals of care. Patient, family, team meeting today    Recs:  HD TTS  All meds to renal doses  Epogen with HD  Labs on dialysis days  Will follow  D/w

## 2022-10-05 NOTE — CARE PLAN
The patient is Watcher - Medium risk of patient condition declining or worsening    Shift Goals  Clinical Goals: Monitor VS, Monitor I/O  Patient Goals: Rest  Family Goals: N/A    Patient was updated on plan of care. Patient used call light to use the bathroom, patient ambulated to the bathroom with RN present. Perineal pads changed. Patient has nonskid socks on and has the bed locked in the lowest position.    Progress made toward(s) clinical / shift goals:      Problem: Knowledge Deficit - Standard  Goal: Patient and family/care givers will demonstrate understanding of plan of care, disease process/condition, diagnostic tests and medications  Outcome: Progressing     Problem: Skin Integrity  Goal: Skin integrity is maintained or improved  Outcome: Progressing     Problem: Fall Risk  Goal: Patient will remain free from falls  Outcome: Progressing       Patient is not progressing towards the following goals:

## 2022-10-06 NOTE — DISCHARGE PLANNING
[facility] representative notified on 10/5/22 -- Pt is approved for Dialysis w/ [facility] @ the [facility] [place] [place].  Pt's scheduled chair time is for  M, W, F  @ 0530  --  Pt to [place] the [facility] [place]  10[place]7/22.   [facility] representative notified Pt & family.  RN updated on 10/5/22 by JAYESH.

## 2022-10-06 NOTE — DISCHARGE SUMMARY
Discharge Summary    CHIEF COMPLAINT ON ADMISSION  No chief complaint on file.      Reason for Admission  Metastatic uterine cancer, acute r*     Admission Date  9/25/2022    CODE STATUS  DNAR/DNI    HPI & HOSPITAL COURSE  Aimee Mauro is a 76 y.o. female admitted 9/25/2022 with hyponatremia.  She was seen at [facility] on 9/18/2022 with peripheral edema, and found to have sodium of 117.  She left AMA on 9/20/2022. She was sent back to [facility] on 9/23/2022 by her PCP due to low sodium and acute on chronic kidney disease.  Work-up showed a large uterine mass with lymphadenopathy, concerning for metastatic disease.  Nephrology were consulted for progressive renal failure. She was sent to [facility] for further work-up.  She has stage IV CKD, and paroxysmal atrial fibrillation (on Apixaban), hypertension, CVA, hearing deficit.  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       Dr. Ames was consulted, and recommended retroperitoneal lymph node biopsy to rule out metastatic disease.   was 301. Patient underwent CT-guided right renal biopsy on 9/26/2022.  She underwent CT-guided retroperitoneal lymph node biopsy on 9/27/2022.  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  Treatments option included robotic hysterectomy, bilateral salpingo-oophorectomy, possible pelvic and periaortic lymph node dissection.     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  Due to progressive renal failure and low urine output, dialysis was started.     On 10/4/2022, patient decided not to undergo any abdominal surgery (hysterectomy, bilateral salpingo-oophorectomy, or possible pelvic and periaortic lymph node dissection).  She wishes to continue hemodialysis.     Patient denies pain. She is afebrile and hemodynamically stable.  Creatinine is 3.34.  Patient will be discharged home today.  [facility] has been set up.  Outpatient dialysis with [facility] has been scheduled to start 10/7/2022, and then on  Mondays, Wednesdays, and Fridays thereafter.    Discharge Date  10/6/2022    FOLLOW UP ITEMS POST DISCHARGE  Nephrology, hemodialysis,, primary care physician    DISCHARGE DIAGNOSES  Principal Problem:    Uterine mass POA: Yes      Overview: Patient had any incidental finding for uterine mass measuring 4.8 x 3.7 x       4.3 cm found on renal US. No un-intentional weight loss, no night sweats       or fevers   Active Problems:    Acute renal failure superimposed on stage 4 chronic kidney disease (HCC) POA: Yes      Overview: Labs from 9/7/22      -Creatine 2.8      -GFR 18            Patient had an echo ruling out heart failure. Retroperionteal US did not       mention hydronephrosis.     Goals of care, counseling/discussion POA: Unknown    Bilateral leg swelling POA: Yes    Hyponatremia POA: Yes    Hypocalcemia POA: Yes    Normocytic anemia POA: Unknown    Hyperphosphatemia POA: Unknown    Paroxysmal atrial fibrillation (HCC) POA: Yes      Overview: Patient had an episode of afib on telemetry. She had an echo that did not       report MR. She is on coreg. She not on blood thinners currently. She has       an appointment with cardiology on 9/20.    Hypertension POA: Yes    Thrombocytopenia (HCC) POA: Yes  Resolved Problems:    * No resolved hospital problems. *      FOLLOW UP  Future Appointments   Date Time Provider Department Center   10/18/2022 10:40 AM José Tian D.O. [facility] [place]   11/15/2022  2:30 PM Anton Hernandez M.D. [facility]   12/20/2022  3:30 PM Howie Buckley M.D. [facility] None   1/13/2023  2:00 PM Amy Stevenson M.D. [facility] None     No follow-up provider specified.    MEDICATIONS ON DISCHARGE     Medication List        START taking these medications        Instructions   calcium acetate 667 MG Tabs tablet  Commonly known as: PHOS-LO   Take 1 Tablet by mouth 3 times a day before meals.  Dose: 667 mg     ferrous sulfate 325 (65 Fe) MG tablet   Take 1 Tablet by mouth every morning with  breakfast.  Dose: 325 mg     furosemide 80 MG Tabs  Commonly known as: LASIX   Take 1 Tablet by mouth 2 times a day.  Dose: 80 mg            CONTINUE taking these medications        Instructions   apixaban 5mg Tabs  Commonly known as: Eliquis   Take 1 Tablet by mouth 2 times a day.  Dose: 5 mg     carvedilol 3.125 MG Tabs  Commonly known as: COREG   Take 1 Tablet by mouth 2 times a day with meals for 30 days.  Dose: 3.125 mg     traZODone 100 MG Tabs  Commonly known as: DESYREL   Take 1 Tablet by mouth every evening.  Dose: 100 mg     vitamin D 1000 UNIT Tabs  Commonly known as: VITAMIND D3   Take 1 Tablet by mouth every day.  Dose: 1,000 Units            STOP taking these medications      sodium bicarbonate 325 MG Tabs     sulfamethoxazole-trimethoprim 800-160 MG tablet  Commonly known as: BACTRIM DS              Allergies  No Known Allergies    DIET  Orders Placed This Encounter   Procedures    Diet Order Diet: Renal     Standing Status:   Standing     Number of Occurrences:   1     Order Specific Question:   Diet:     Answer:   Renal [8]       ACTIVITY  As tolerated.  Weight bearing as tolerated    CONSULTATIONS  Nephrology  Gyne/Onc    PROCEDURES  HD    LABORATORY  Lab Results   Component Value Date    SODIUM 129 (L) 10/06/2022    POTASSIUM 4.2 10/06/2022    CHLORIDE 96 10/06/2022    CO2 28 10/06/2022    GLUCOSE 96 10/06/2022    BUN 18 10/06/2022    CREATININE 3.34 (H) 10/06/2022        Lab Results   Component Value Date    WBC 5.1 10/06/2022    HEMOGLOBIN 7.5 (L) 10/06/2022    HEMATOCRIT 22.3 (L) 10/06/2022    PLATELETCT 113 (L) 10/06/2022        Total time of the discharge process exceeds 38 minutes.

## 2022-10-06 NOTE — FACE TO FACE
Face to Face Supporting Documentation - Home Health    The encounter with this patient was in whole or in part the primary reason for home health admission.    Date of encounter:   Patient:                    MRN:                       YOB: 2024  Aimee Mauro  [id number]  1945     Home health to see patient for:  Skilled Nursing care for assessment, interventions & education, Physical Therapy evaluation and treatment, and Occupational therapy evaluation and treatment    Skilled need for:  New Onset Medical Diagnosis malignancy    Skilled nursing interventions to include:  Comment: Medication management    Homebound status evidenced by:  Need the aid of supportive devices such as crutches, canes, wheelchairs or walkers, Require the use of special transportation, Needs the assistance of another person in order to leave the home, or Have a condition such that leaving his or her home is medically contraindicated. Leaving home requires a considerable and taxing effort. There is a normal inability to leave the home.    Community Physician to provide follow up care: José Tian D.O.     Optional Interventions? No      I certify the face to face encounter for this home health care referral meets the CMS requirements and the encounter/clinical assessment with the patient was, in whole, or in part, for the medical condition(s) listed above, which is the primary reason for home health care. Based on my clinical findings: the service(s) are medically necessary, support the need for home health care, and the homebound criteria are met.  I certify that this patient has had a face to face encounter by myself.  Kourtney Quinones M.D. - NPI: [id number]

## 2022-10-06 NOTE — DISCHARGE INSTRUCTIONS
Follow-up with nephrology within the next few days.     Follow-up with your primary care physician within the next few days    Next dialysis session is on 10/7/2022.      Uterine Cancer    Uterine cancer is an abnormal growth of cancer tissue (malignant tumor) in the uterus. Unlike noncancerous (benign) tumors, malignant tumors can spread to other parts of the body. Uterine cancer usually occurs after menopause. However, it may also occur around the time that menopause begins.  The wall of the uterus has an inner layer of tissue (endometrium) and an outer layer of muscle tissue (myometrium). The most common type of uterine cancer begins in the endometrium (endometrial cancer). Cancer that begins in the myometrium (uterine sarcoma) is very rare.  What are the causes?  The exact cause of this condition is not known.  What increases the risk?  You are more likely to develop this condition if you:  Are older than 50.  Have an enlarged endometrium (endometrial hyperplasia).  Use hormone therapy.  Are severely overweight (obese).  Use the medicine tamoxifen.  You are white ().  Cannot bear children (are infertile).  Have never been pregnant.  Started menstruating at an age younger than 12 years.  Are older than 52 and are still having menstrual periods.  Have a history of cancer of the ovaries, intestines, or colon or rectum (colorectal cancer).  Have a history of enlarged ovaries with small cysts (polycystic ovarian syndrome).  Have a family history of:  Uterine cancer.  Hereditary nonpolyposis colon cancer (HNPCC).  Have diabetes, high blood pressure, thyroid disease, or gallbladder disease.  Use long-term, high-dose birth control pills.  Have been exposed to radiation.  Smoke.  What are the signs or symptoms?  Symptoms of this condition include:  Abnormal vaginal bleeding or discharge. Bleeding may start as a watery, blood-streaked flow that gradually contains more blood. This is the most common symptom. If  you experience abnormal vaginal bleeding, do not assume that it is part of menopause.  Vaginal bleeding after menopause.  Unexplained weight loss.  Bleeding between periods.  Urination that is difficult, painful, or more frequent than usual.  A lump (mass) in the vagina.  Pain, bloating, or fullness in the abdomen.  Pain in the pelvic area.  Pain during sex.  How is this diagnosed?  This condition may be diagnosed based on:  Your medical history and your symptoms.  A physical and pelvic exam. Your health care provider will feel your pelvis for any growths or enlarged lymph nodes.  Blood and urine tests.  Imaging tests, such as X-rays, CT scans, ultrasound, or MRIs.  A procedure in which a thin, flexible tube with a light and camera on the end is inserted through the vagina and used to look inside the uterus (hysteroscopy).  A Pap test to check for abnormal cells in the lower part of the uterus (cervix) and the upper vagina.  Removing a tissue sample (biopsy) from the uterine lining to check for cancer cells.  Dilation and curettage (D&C). This is a procedure that involves stretching (dilation) the cervix and scraping (curettage) the inside lining of the uterus to get a biopsy and check for cancer cells.  Your cancer will be staged to determine its severity and extent. Staging is an assessment of:  The size of the tumor.  Whether the cancer has spread.  Where the cancer has spread.  The stages of uterine cancer are as follows:  Stage I. The cancer is only found in the uterus.  Stage II. The cancer has spread to the cervix.  Stage III. The cancer has spread outside the uterus, but not outside the pelvis. The cancer may have spread to the lymph nodes in the pelvis. Lymph nodes are part of your body's disease-fighting (immune) system. Lymph nodes are found in many locations in your body, including the neck, underarm, and groin.  Stage IV. The cancer has spread to other parts of the body, such as the bladder or  rectum.  How is this treated?  This condition is often treated with surgery to remove:  The uterus, cervix, fallopian tubes, and ovaries (total hysterectomy).  The uterus and cervix (simple hysterectomy).  The type of hysterectomy you will have depends on the extent of your cancer. Lymph nodes near the uterus may also be removed in some cases. Treatment may also include one or more of the following:  Chemotherapy. This uses medicines to kill the cancer cells and prevent their spread.  Radiation therapy. This uses high-energy rays to kill the cancer cells and prevent the spread of cancer.  Chemoradiation. This is a combination treatment that alternates chemotherapy with radiation treatments to enhance the way radiation works.  Brachytherapy. This involves placing radioactive materials inside the body where the cancer was removed.  Hormone therapy. This includes taking medicines that lower the levels of estrogen in the body.  Follow these instructions at home:  Activity  Return to your normal activities as told by your health care provider. Ask your health care provider what activities are safe for you.  Exercise regularly as told by your health care provider.  Do not drive or use heavy machinery while taking prescription pain medicine.  General instructions  Take over-the-counter and prescription medicines only as told by your health care provider.  Maintain a healthy diet.  Work with your health care provider to:  Manage any long-term (chronic) conditions you have, such as diabetes, high blood pressure, thyroid disease, or gallbladder disease.  Manage any side effects of your treatment.  Do not use any products that contain nicotine or tobacco, such as cigarettes and e-cigarettes. If you need help quitting, ask your health care provider.  Consider joining a support group to help you cope with stress. Your health care provider may be able to recommend a local or online support group.  Keep all follow-up visits as  told by your health care provider. This is important.  Where to find more information  American Cancer Society: https://www.cancer.org  National [employer] (NCI): https://www.cancer.gov  Contact a health care provider if:  You have pain in your pelvis or abdomen that gets worse.  You cannot urinate.  You have abnormal bleeding.  You have a fever.  Get help right away if:  You develop sudden or new severe symptoms, such as:  Heavy bleeding.  Severe weakness.  Pain that is severe or does not get better with medicine.  Summary  Uterine cancer is an abnormal growth of cancer tissue (malignant tumor) in the uterus. The most common type of uterine cancer begins in the endometrium (endometrial cancer).  This condition is often treated with surgery to remove the uterus, cervix, fallopian tubes, and ovaries (total hysterectomy) or the uterus and cervix (simple hysterectomy).  Work with your health care provider to manage any long-term (chronic) conditions you have, such as diabetes, high blood pressure, thyroid disease, or gallbladder disease.  Consider joining a support group to help you cope with stress. Your health care provider may be able to recommend a local or online support group.  This information is not intended to replace advice given to you by your health care provider. Make sure you discuss any questions you have with your health care provider.  Document Released: 12/18/2006 Document Revised: 11/30/2018 Document Reviewed: 12/15/2017  Elsevier Patient Education © 2020 [employer].  Hyponatremia  Hyponatremia is when the amount of salt (sodium) in your blood is too low. When salt levels are low, your body may take in extra water. This can cause swelling throughout the body. The swelling often affects the brain.  What are the causes?  This condition may be caused by:  Certain medical problems or conditions.  Vomiting a lot.  Having watery poop (diarrhea) often.  Certain medicines or illegal drugs.  Not  having enough water in the body (dehydration).  Drinking too much water.  Eating a diet that is low in salt.  Large burns on your body.  Too much sweating.  What increases the risk?  You are more likely to get this condition if you:  Have long-term (chronic) kidney disease.  Have heart failure.  Have a medical condition that causes you to have watery poop often.  Do very hard exercises.  Take medicines that affect the amount of salt is in your blood.  What are the signs or symptoms?  Symptoms of this condition include:  Headache.  Feeling like you may vomit (nausea).  Vomiting.  Being very tired (lethargic).  Muscle weakness and cramps.  Not wanting to eat as much as normal (loss of appetite).  Feeling weak or light-headed.  Severe symptoms of this condition include:  Confusion.  Feeling restless (agitation).  Having a fast heart rate.  Passing out (fainting).  Seizures.  Coma.  How is this treated?  Treatment for this condition depends on the cause. Treatment may include:  Getting fluids through an IV tube that is put into one of your veins.  Taking medicines to fix the salt levels in your blood. If medicines are causing the problem, your medicines will need to be changed.  Limiting how much water or fluid you take in.  Monitoring in the hospital to watch your symptoms.  Follow these instructions at home:    Take over-the-counter and prescription medicines only as told by your doctor. Many medicines can make this condition worse. Talk with your doctor about any medicines that you are taking.  Eat and drink exactly as you are told by your doctor.  Eat only the foods you are told to eat.  Limit how much fluid you take.  Do not drink alcohol.  Keep all follow-up visits as told by your doctor. This is important.  Contact a doctor if:  You feel more like you may vomit.  You feel more tired.  Your headache gets worse.  You feel more confused.  You feel weaker.  Your symptoms go away and then they come back.  You have  trouble following the diet instructions.  Get help right away if:  You have a seizure.  You pass out.  You keep having watery poop.  You keep vomiting.  Summary  Hyponatremia is when the amount of salt in your blood is too low.  When salt levels are low, you can have swelling throughout the body. The swelling mostly affects the brain.  Treatment depends on the cause. Treatment may include getting IV fluids, medicines, or not drinking as much fluid.  This information is not intended to replace advice given to you by your health care provider. Make sure you discuss any questions you have with your health care provider.  Document Released: 08/29/2012 Document Revised: 03/05/2020 Document Reviewed: 11/21/2019  [employer] Patient Education © 2020 [employer]  Furosemide tablets  What is this medicine?  FUROSEMIDE (fyoor [place] se mide) is a diuretic. It helps you make more urine and to lose salt and excess water from your body. This medicine is used to treat high blood pressure, and edema or swelling from heart, kidney, or liver disease.  This medicine may be used for other purposes; ask your health care provider or pharmacist if you have questions.  COMMON BRAND NAME(S): Active-Medicated Specimen Kit, Delone, Diuscreen, Lasix, RX Specimen Collection Kit, Specimen Collection Kit, URINX Medicated Specimen Collection  What should I tell my health care provider before I take this medicine?  They need to know if you have any of these conditions:  abnormal blood electrolytes  diarrhea or vomiting  gout  heart disease  kidney disease, small amounts of urine, or difficulty passing urine  liver disease  thyroid disease  an unusual or allergic reaction to furosemide, sulfa drugs, other medicines, foods, dyes, or preservatives  pregnant or trying to get pregnant  breast-feeding  How should I use this medicine?  Take this medicine by mouth with a glass of water. Follow the directions on the prescription label. You may take this  medicine with or without food. If it upsets your stomach, take it with food or milk. Do not take your medicine more often than directed. Remember that you will need to pass more urine after taking this medicine. Do not take your medicine at a time of day that will cause you problems. Do not take at bedtime.  Talk to your pediatrician regarding the use of this medicine in children. While this drug may be prescribed for selected conditions, precautions do apply.  Overdosage: If you think you have taken too much of this medicine contact a poison control center or emergency room at once.  NOTE: This medicine is only for you. Do not share this medicine with others.  What if I miss a dose?  If you miss a dose, take it as soon as you can. If it is almost time for your next dose, take only that dose. Do not take double or extra doses.  What may interact with this medicine?  aspirin and aspirin-like medicines  certain antibiotics  chloral hydrate  cisplatin  cyclosporine  digoxin  diuretics  laxatives  lithium  medicines for blood pressure  medicines that relax muscles for surgery  methotrexate  NSAIDs, medicines for pain and inflammation like ibuprofen, naproxen, or indomethacin  phenytoin  steroid medicines like prednisone or cortisone  sucralfate  thyroid hormones  This list may not describe all possible interactions. Give your health care provider a list of all the medicines, herbs, non-prescription drugs, or dietary supplements you use. Also tell them if you smoke, drink alcohol, or use illegal drugs. Some items may interact with your medicine.  What should I watch for while using this medicine?  Visit your doctor or health care provider for regular checks on your progress. Check your blood pressure regularly. Ask your doctor or health care provider what your blood pressure should be, and when you should contact him or her. If you are a diabetic, check your blood sugar as directed.  This medicine may cause serious skin  reactions. They can happen weeks to months after starting the medicine. Contact your health care provider right away if you notice fevers or flu-like symptoms with a rash. The rash may be red or purple and then turn into blisters or peeling of the skin. Or, you might notice a red rash with swelling of the face, lips or lymph nodes in your neck or under your arms.  You may need to be on a special diet while taking this medicine. Check with your doctor. Also, ask how many glasses of fluid you need to drink a day. You must not get dehydrated.  You may get drowsy or dizzy. Do not drive, use machinery, or do anything that needs mental alertness until you know how this drug affects you. Do not stand or sit up quickly, especially if you are an older patient. This reduces the risk of dizzy or fainting spells. Alcohol can make you more drowsy and dizzy. Avoid alcoholic drinks.  This medicine can make you more sensitive to the sun. Keep out of the sun. If you cannot avoid being in the sun, wear protective clothing and use sunscreen. Do not use sun lamps or tanning beds/booths.  What side effects may I notice from receiving this medicine?  Side effects that you should report to your doctor or health care professional as soon as possible:  blood in urine or stools  dry mouth  fever or chills  hearing loss or ringing in the ears  irregular heartbeat  muscle pain or weakness, cramps  rash, fever, and swollen lymph nodes  redness, blistering, peeling or loosening of the skin, including inside the mouth  skin rash  stomach upset, pain, or nausea  tingling or numbness in the hands or feet  unusually weak or tired  vomiting or diarrhea  yellowing of the eyes or skin  Side effects that usually do not require medical attention (report to your doctor or health care professional if they continue or are bothersome):  headache  loss of appetite  unusual bleeding or bruising  This list may not describe all possible side effects. Call your  doctor for medical advice about side effects. You may report side effects to FDA at 5-039-AHA-9780.  Where should I keep my medicine?  Keep out of the reach of children.  Store at room temperature between 15 and 30 degrees C (59 and 86 degrees F). Protect from light. Throw away any unused medicine after the expiration date.  NOTE: This sheet is a summary. It may not cover all possible information. If you have questions about this medicine, talk to your doctor, pharmacist, or health care provider.  © 2020 Elsevier/Gold Standard (2020-03-20 14:04:13)  Hemodialysis  Hemodialysis is a way of removing wastes, salt, and extra water from your blood. It also helps keep healthy mineral levels in your blood. It is done when your kidneys no longer work properly and cannot keep your blood clean. During hemodialysis, your blood travels outside of your body through a tube to a machine (dialyzer). A filter in the machine cleans the blood and the blood returns to your body through a tube. It is usually done 3 times a week. Visits last 3-5 hours.  What happens before the procedure?              You will need a procedure to make an opening (vascular access). This is where blood is removed and returned to the body. There are three types:  Arteriovenous fistula. An artery and a vein are connected. This is usually in the arm.  Arteriovenous graft. An artery and a vein in the arm are connected with a tube.  Venous catheter. A tube (catheter) is placed in a vein in your neck, chest, or groin.  What happens during the procedure?    Your weight, blood pressure, pulse, and temperature will be taken.  The skin around your access will be cleaned.  Your access will be connected to the machine.  If you have a fistula or graft, two needles will be placed through it. They will be connected to a tube. The tube will be connected to the machine. The needles will be taped to your skin. This keeps them from moving.  If you have a tube (catheter), it  will be connected to a plastic tube. That plastic tube will then be connected to the machine.  Your blood will go through the tube to the machine. The machine will clean your blood. Then your blood will go back to your body through a tube. Your blood pressure and pulse will be checked a few times while the machine is running.  When dialysis is done, you will be disconnected from the machine. If you have a fistula or graft, the needles will be removed and a bandage (dressing) will be put on the access. If you have a tube, the tube connected to the machine will be removed.  Hemodialysis is done while you are sitting or reclining. You may sleep, read, or do other tasks that can be done in this position. If you have side effects, tell your doctor.  The procedure may vary with doctors or hospitals.  What happens after the procedure?  You will be weighed.  Your blood will be tested. This is usually done once a month.  You may have side effects. These may include:  Dizziness.  Muscle cramps.  Feeling sick to your stomach (nausea).  Headaches.  Allergic reaction.  Summary  Hemodialysis is a way of removing wastes, salt, and extra water from your blood.  Before the procedure, you will need an opening where blood is removed from the body and returned to the body. This is done with a procedure.  During hemodialysis, your blood travels outside of your body through a tube to a machine. A filter in the machine cleans the blood.  This information is not intended to replace advice given to you by your health care provider. Make sure you discuss any questions you have with your health care provider.  Document Released: 11/30/2009 Document Revised: 11/30/2018 Document Reviewed: 01/23/2018  Elsevier Patient Education © 2020 Elsevier Inc.

## 2022-10-06 NOTE — CARE PLAN
The patient is Stable - Low risk of patient condition declining or worsening    Shift Goals  Clinical Goals: Monitor BP  Patient Goals: Sleep      Progress made toward(s) clinical / shift goals:     Problem: Knowledge Deficit - Standard  Goal: Patient and family/care givers will demonstrate understanding of plan of care, disease process/condition, diagnostic tests and medications  Outcome: Progressing     Problem: Skin Integrity  Goal: Skin integrity is maintained or improved  Outcome: Progressing     Problem: Fall Risk  Goal: Patient will remain free from falls  Outcome: Progressing     Problem: Pain - Standard  Goal: Alleviation of pain or a reduction in pain to the patient’s comfort goal  Outcome: Progressing

## 2022-10-06 NOTE — PROGRESS NOTES
[facility] Progress Note       HD ordered by Dr. Urbina. Treatment started at 0733 and ended at 1033.    Net UF removed: 2000mL.     Pt tolerated treatment well with no issues. See flow sheet for details. CVC patent, locked with Heparin 1:1000 units; 1.6 mL to RED port and 1.6 mL to BLUE port post dialysis. No s/s of infection present. Dressing in place, CDI. Report given to OPAL Gordon RN.

## 2022-10-06 NOTE — PROCEDURES
Nephrology/Hemodialysis note    Patient with SAY due to MN secondary to metastatic endometrial cancer initiated RRT  Seen and examined during dialysis treatment  Tolerates well  VS stable  Lab results reviewed  UF 2-3 L  Please see dialysis flow sheet for details

## 2022-10-07 NOTE — DISCHARGE PLANNING
Case Management Discharge Planning    Admission Date: 9/25/2022  GMLOS: 3.3  ALOS: 11    6-Clicks ADL Score: 20  6-Clicks Mobility Score: 21      Anticipated Discharge Dispo: Discharge Disposition: D/T to home under HHA care in anticipation of covered skilled care (06)  Discharge Address: [facility] [place] [address] ( 2 story, 14 steps to second floor and 2 steps into the home)  Discharge Contact Phone Number: [phone], [phone]    DME Needed: No    Action(s) Taken: Family Conference.  CM met w/ Pt. & family to answer HH questions (per family request).  CM answered all questions to Pt & family's satisfaction.  No other concerns voiced.     Escalations Completed: None    Medically Clear: Yes    Barriers to Discharge: None    Is the patient up for discharge tomorrow: No, Pt is medically cleared to D/C to Home w/ [facility] -- D/C orders written -- Family to Transport.   Pt D/C'd to home on 10/6/22.

## 2022-10-10 NOTE — DISCHARGE PLANNING
Outpatient Dialysis Note:    Patient has been placed and confirmed at:    [facility]  [address]  [place] [address]    Phone: [phone]    Schedule: Monday, Wednesday, Friday   Time: 5:30 AM    Nephrologist, Dr. Urbina and JAYESH Jean notified.  Provided patient with dialysis schedule welcome letter.     Xitlaly Reyes - Dialysis Coordinator   [facility]   [phone]

## 2022-10-10 NOTE — PROGRESS NOTES
Medication chart review for [facility] services    Received referral from [facility].   Medications reviewed  compared with discharge summary if available.    Current medication list per [facility]     Current Outpatient Medications:     calcium acetate, 667 mg, Oral, TID AC (Patient taking differently: 667 mg, Oral, 3 TIMES DAILY BEFORE MEALS, Other, Indications: Hyperphosphatemia D/T Renal Insufficiency)    ferrous sulfate, 325 mg, Oral, QDAY with Breakfast (Patient taking differently: 325 mg, Oral, EVERY MORNING WITH BREAKFAST, Other, Indications: Iron Deficiency Anemia)    furosemide, 80 mg, Oral, BID (Patient taking differently: 80 mg, Oral, 2 TIMES DAILY, Other, Indications: Edema)    carvedilol, 3.125 mg, Oral, BID WITH MEALS (Patient taking differently: 3.125 mg, Oral, 2 TIMES DAILY WITH MEALS, Other, Indications: Hypertension)    vitamin D, 1,000 Units, Oral, DAILY (Patient taking differently: 1,000 Units, Oral, DAILY, Other, Indications: Vitamin D Deficiency)    apixaban, 5 mg, Oral, BID (Patient taking differently: 5 mg, Oral, 2 TIMES DAILY, Other, Indications: Pulmonary Embolism)    traZODone, 100 mg, Oral, Nightly (Patient taking differently: 100 mg, Oral, EVERY BEDTIME PRN, Sleep, Other, Indications: Insomnia)    Location of hospital, and discharge summary date, if applicable:   N/a    No Known Allergies    Labs     Lab Results   Component Value Date/Time    SODIUM 131 (L) 10/06/2022 11:19 AM    POTASSIUM 3.6 10/06/2022 11:19 AM    CHLORIDE 94 (L) 10/06/2022 11:19 AM    CO2 31 10/06/2022 11:19 AM    GLUCOSE 90 10/06/2022 11:19 AM    BUN 9 10/06/2022 11:19 AM    CREATININE 1.77 (H) 10/06/2022 11:19 AM     Lab Results   Component Value Date/Time    ALKPHOSPHAT 63 10/04/2022 04:17 AM    ASTSGOT 22 10/04/2022 04:17 AM    ALTSGPT 12 10/04/2022 04:17 AM    TBILIRUBIN 0.3 10/04/2022 04:17 AM    INR 1.19 (H) 09/25/2022 05:01 PM    ALBUMIN 1.5 (L) 10/04/2022 04:17 AM    ALBUMIN 2.16 (L) 09/19/2022  12:31 PM        Assessment for clinically significant drug interactions, drug omissions/additions, duplicative therapies.            CC   José Tian D.O.  [address]  [place] [address]  Fax: [phone]    [facility]  Phone [phone] fax [phone]    This note was created using voice recognition software (Dragon). The accuracy of the dictation is limited by the abilities of the software. I have reviewed the note prior to signing, however some errors in grammar and context are still possible. If you have any questions related to this note please do not hesitate to contact our office.

## 2022-10-14 NOTE — ED PROVIDER NOTES
"ED Provider Note    CHIEF COMPLAINT  Chief Complaint   Patient presents with    Dizziness     Pt arrives via EMS with c/o dizziness today. States that she went to get up to restroom, but could not urinate but did have a soft BM. States that she felt dizzy when she was trying to use the restroom. States that she has been having black/grey vaginal discharge. Has hx of uterine cancer with mets to kidneys and recently started on dialysis, MWF. Last received dialysis yesterday.        HPI  Aimee Mauro is a 76 y.o. female who presents to the emergency department chief complaint of dizziness feeling as if she was going to pass out.  Patient was recently hospitalized and just discharged a week ago after having been diagnosed with metastatic uterine cancer with acute renal failure now on dialysis.  She was consulted on by Dr. Ames who after giving the patient lots of options has decided to not continue with any surgical nor chemotherapy options for her cancer.  He recommended that they start on hospice and that she stopped dialysis because if they discontinue this way then her cancer will progress leading to vaginal bleeding and hygiene issues and possibly sepsis.    Dr. Urbina saw her for nephrology she actually was dialyzed Monday and Wednesday after being discharged from the hospital.  She is here today because she states she went to use the restroom and was not able to urinate.  Even though chart review reveals the patient had very limited urine output while she was hospitalized and receiving inpatient dialysis.  She is not a great historian still confused her  states that they have been really busy since she was discharged from the hospital with a lot of visitors and today was their \"down day\" and she just stated that she felt like she was going to pass out regardless of whether she was lying down or sitting up.  No vomiting no fever she states she has chronic vaginal discharge and a little bit of " "discomfort    REVIEW OF SYSTEMS  Positives as above. Pertinent negatives include nausea vomiting chest pain headache new worsening weakness numbness tingling dysuria hematuria flank pain bloody vaginal discharge fevers chills  All other review of systems are negative    PAST MEDICAL HISTORY   has a past medical history of CKD (chronic kidney disease), stage IV (HCC), Congestive heart failure (HCC), Insomnia disorder, and Stroke (HCC) (01/01/1978).    SOCIAL HISTORY  Social History     Tobacco Use    Smoking status: Never    Smokeless tobacco: Never   Vaping Use    Vaping Use: Never used   Substance and Sexual Activity    Alcohol use: Yes     Comment: 1-2 glasses of wine per week.    Drug use: No    Sexual activity: Not on file       SURGICAL HISTORY   has a past surgical history that includes tubal coagulation laparoscopic bilateral.    CURRENT MEDICATIONS  Home Medications    **Home medications have not yet been reviewed for this encounter**         ALLERGIES  No Known Allergies    PHYSICAL EXAM  VITAL SIGNS: BP (!) 144/83   Pulse 80   Temp 36.1 °C (97 °F) (Temporal)   Resp 16   Ht 1.626 m (5' 4\")   Wt 73.5 kg (162 lb)   SpO2 95%   BMI 27.81 kg/m²    Pulse ox interpretation: I interpret this pulse ox as normal.  Constitutional: Alert in no apparent distress mildly confused.  HENT: Normocephalic atraumatic, MMM  Eyes: PER, Conjunctiva normal, Non-icteric.   Neck: Normal range of motion, No tenderness, Supple, No stridor.   Cardiovascular: Regular rate and rhythm, no murmurs.  Dialysis cath in the right chest clean dry and intact  Thorax & Lungs: Normal breath sounds, No respiratory distress, No wheezing, No chest tenderness.   Abdomen: Bowel sounds normal, Soft, suprapubic area mildly full mildly tender no rebound or guarding no pulsatile masses  Skin: Warm, Dry, No erythema, No rash.   Back: No bony tenderness, No CVA tenderness.   Extremities/MSK: Intact equal distal pulses, No edema, No tenderness, No " cyanosis, no major deformities noted  Neurologic: Alert and oriented x3, No focal deficits noted.       DIFFERENTIAL DIAGNOSIS AND WORK UP PLAN    This is a 76 y.o. female who presents with dizziness lightheadedness in the setting of newly diagnosed metastatic cancer and new  dialysis.  We will do a bladder scan evaluate if she has any urine in the bladder laboratory Alysis evaluate for severe anemia or electrolyte abnormality she is mildly pale.  EKG due to the dizziness to ensure its not an arrhythmia and then discussed with him the options.    DIAGNOSTIC STUDIES / PROCEDURES    EKG  Results for orders placed or performed during the hospital encounter of 10/13/22   EKG (NOW)   Result Value Ref Range    Report       [facility] Emergency Dept.    Test Date:  2022-10-13  Pt Name:    INEZ FARMER               Department: ER  MRN:        [id number]                      Room:       [facility]  Gender:     Female                       Technician: [id number]  :        [id number]                   Requested By:AMY RUIZ  Order #:    [id number]                    Reading MD: Amy Ruiz MD    Measurements  Intervals                                Axis  Rate:       73                           P:          4  CO:         136                          QRS:        55  QRSD:       87                           T:          17  QT:         374  QTc:        413    Interpretive Statements  Sinus rhythm at a rate of 73 no ST elevations or ST depressions no abnormal T  wave inversions no pathologic Q waves normal intervals normal axis  Compared to ECG 2022 15:38:26  No significant changes  Electronically Signed On 10- 18:44:23 PDT by Amy Ruiz MD         LABS  Pertinent Lab Findings    Labs Reviewed   CBC WITH DIFFERENTIAL - Abnormal; Notable for the following components:       Result Value    RBC 2.65 (*)     Hemoglobin 7.8 (*)     Hematocrit 23.2 (*)     Monocytes 14.10  (*)     All other components within normal limits   COMP METABOLIC PANEL - Abnormal; Notable for the following components:    Sodium 129 (*)     Chloride 94 (*)     Anion Gap 5.0 (*)     Creatinine 2.33 (*)     Calcium 8.1 (*)     Albumin 2.1 (*)     Total Protein 5.0 (*)     All other components within normal limits   ESTIMATED GFR - Abnormal; Notable for the following components:    GFR (CKD-EPI) 21 (*)     All other components within normal limits   URINALYSIS,CULTURE IF INDICATED - Abnormal; Notable for the following components:    Character Cloudy (*)     Protein >=1000 (*)     Leukocyte Esterase Small (*)     Occult Blood Large (*)     All other components within normal limits    Narrative:     Indication for culture:->Patient WITHOUT an indwelling Pastrana  catheter in place with new onset of Dysuria, Frequency,  Urgency, and/or Suprapubic pain   URINE MICROSCOPIC (W/UA) - Abnormal; Notable for the following components:    WBC  (*)     RBC >150 (*)     Bacteria Few (*)     Hyaline Cast 11-20 (*)     All other components within normal limits    Narrative:     Indication for culture:->Patient WITHOUT an indwelling Pastrana  catheter in place with new onset of Dysuria, Frequency,  Urgency, and/or Suprapubic pain   URINE CULTURE(NEW)    Narrative:     Indication for culture:->Patient WITHOUT an indwelling Pastrana  catheter in place with new onset of Dysuria, Frequency,  Urgency, and/or Suprapubic pain       RADIOLOGY  No orders to display     The radiologist's interpretation of all radiological studies have been reviewed by me.      COURSE & MEDICAL DECISION MAKING  Pertinent Labs & Imaging studies reviewed. (See chart for details)    I reassessed the patient at the bedside with her .  She has chronic renal disease without severe electrolyte abnormality urinalysis really is more contaminated mostly with RBC secondary to her cancer and her white blood cell count is normal with a chronic anemia that is  "unchanged.  She is resting comfortably her vital signs have been within normal limits she was given a half morphine for her pain which I will write her for some at home we had a long discussion about hospice but  states she still wants to do dialysis and so she can start hospice until she stops dialysis and they are not ready to do that quite yet.  Interventions 1 have some chronic pain from her cancer if her comfortable going home to follow with dialysis in the morning return to the ED for any new or worsening issues.    BP (!) 145/65   Pulse 68   Temp 36.1 °C (97 °F) (Temporal)   Resp 16   Ht 1.626 m (5' 4\")   Wt 73.5 kg (162 lb)   SpO2 95%   BMI 27.81 kg/m²       I verified that the patient was wearing a mask and I was wearing appropriate PPE every time I entered the room. The patient's mask was on the patient at all times during my encounter except for a brief view of the oropharynx.    The patient will return for new or worsening symptoms and is stable at the time of discharge.    The patient is referred to a primary physician for blood pressure management, diabetic screening, and for all other preventative health concerns.    DISPOSITION:  Patient will be discharged home in stable condition.    FOLLOW UP:  José Tian D.O.  [address]  [place] [address] [phone]    Schedule an appointment as soon as possible for a visit       [facility], Emergency Dept  [address] [address] [phone]    If symptoms worsen      OUTPATIENT MEDICATIONS:  New Prescriptions    MORPHINE (MS IR) 15 MG TABLET    Take 0.5 Tablets by mouth every 8 hours as needed for Severe Pain for up to 5 days.           FINAL IMPRESSION  1. Lightheadedness        2. Metastatic malignant neoplasm, unspecified site (HCC)  morphine (MS IR) 15 MG tablet      3. Anemia of chronic disease                Electronically signed by: Amy Pennington M.D., 10/13/2022 6:23 " PM    This dictation has been created using voice recognition software and/or scribes. The accuracy of the dictation is limited by the abilities of the software and the expertise of the scribes. I expect there may be some errors of grammar and possibly content. I made every attempt to manually correct the errors within my dictation. However, errors related to voice recognition software and/or scribes may still exist and should be interpreted within the appropriate context.

## 2022-10-14 NOTE — ED TRIAGE NOTES
Pt arrives via EMS with c/o dizziness today. States that she went to get up to restroom, but could not urinate but did have a soft BM. States that she felt dizzy when she was trying to use the restroom. States that she has been having black/grey vaginal discharge. Has hx of uterine cancer with mets to kidneys and recently started on dialysis, MWF. Last received dialysis yesterday.

## 2022-10-17 NOTE — CASE COMMUNICATION
Quality Review Completed for 10/8 [facility] by MARIAM Jonas RN on 10/17/2022:  Edits completed by MARIAM Jonas RN:  1.  added #3 and 4 to risks for readmits per EMR reports  2.  changed to yes, the vascular access device and  is newly epithelialized per guidelines  3.  changed to #2 per cardiovascular dyspnea on exertion  4.  changed to #5 per narrative  5.  changed to 2 and  changed to 5 per narrative Sup ervision/Min level of assist  6.  changed to 3 per guidelines when ambulation is supervised  7. [id number] Performance changed to #4 on D, I, J, K, #3 on E, F. Goals changed to supervision #4 on L and #9 on 0  8. Added ambulate only w/assist to safety measures, and F2F on 485 forms  9.  changed to 1 per therapy

## 2022-10-18 PROBLEM — N18.6 ESRD (END STAGE RENAL DISEASE) (HCC): Status: ACTIVE | Noted: 2022-01-01

## 2022-10-18 PROBLEM — C54.9 MALIGNANT NEOPLASM OF BODY OF UTERUS (HCC): Status: ACTIVE | Noted: 2022-01-01

## 2022-10-25 NOTE — CASE COMMUNICATION
I agree with these changes  ----- Message -----  From: Franchesca Jonas R.N.  Sent: 10/17/2022  10:57 AM PDT  To: Silvana Chase R.N.      Quality Review Completed for 10/8 [facility] OASIS by MARIAM Jnoas, RN on 10/17/2022:  Edits completed by MARIAM Jonas, RN:  1.  added #3 and 4 to risks for readmits per EMR reports  2.  changed to yes, the vascular access device and  is newly epithelialized per guidelines  3.  changed to  #2 per cardiovascular dyspnea on exertion  4.  changed to #5 per narrative  5.  changed to 2 and  changed to 5 per narrative Supervision/Min level of assist  6.  changed to 3 per guidelines when ambulation is supervised  7. [id number] Performance changed to #4 on D, I, J, K, #3 on E, F. Goals changed to supervision #4 on L and #9 on 0  8. Added ambulate only w/assist to safety measures, and F2F on 485 forms  9.  gordy nged to 1 per therapy

## 2022-11-03 NOTE — CASE COMMUNICATION
Quality Review Completed for [facility] OASIS by MARIAM Jonas, RN on 11/3/2022:  Edits completed by MARIAM Jonas RN:  1.  is NA to Pressure ulcer and depression per care plan interventions  2.  is yes to ED w/o admit on 10/13 and  is other reasons  3.  changed to 0 per assessments going back the last 5 visits

## 2022-11-09 NOTE — CASE COMMUNICATION
I agree with changes  ----- Message -----  From: Franchesca Jonas R.N.  Sent: 11/3/2022  12:16 PM PST  To: Albina Crawford R.N.      Quality Review Completed for DC OASIS by MARIAM Jonas, RN on 11/3/2022:  Edits completed by MARIAM Jonas RN:  1.  is NA to Pressure ulcer and depression per care plan interventions  2.  is yes to ED w/o admit on 10/13 and  is other reasons  3.  changed to 0 per assessments going ba ck the last 5 visits

## 2022-11-10 NOTE — PROGRESS NOTES
Patient who has uterine cancer along with end-stage renal disease, no longer wants to continue dialysis and would like to pursue hospice.  I myself spoke with the patient today at roughly 1450.  Patient confirmed name and date of birth and voiced understanding of her decision and what hospice means.

## 2022-11-10 NOTE — PROGRESS NOTES
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 She has decided not to do any cancer directed therapy, but has been getting dialysis at [facility], where I see her. See [facility] records for full notes. She called in to [facility] requesting a referral to hospice. Order placed. I believe that the patient likely has a life expectancy of six months or less if she quits dialysis and her disease runs its normal course.    Anton Hernandez MD  Nephrology  [facility]

## 2022-11-10 NOTE — TELEPHONE ENCOUNTER
VOICEMAIL  1. Caller Name: Stephen ()                      Call Back Number: [phone] (home)     2. Message: Patient's  left a voicemail stating that Aimee has decided to not do dialysis and would like a referral to hospice.    3. Patient approves office to leave a detailed voicemail/MyChart message: no

## 2022-11-13 NOTE — HOSPICE
24hr. follow up visit made. Patient sitting on her bed, denies any discomfort. Reviewed hospice philosophy and expectations. Patient and family verbalized understanding. However, patient has been contemplating on changing her mind and continuing with her dialysis. She would notify this RN in case she will have a change of heart and revoke off of hospice services and continue with her treatments.

## 2022-12-16 PROBLEM — I62.9 INTRACRANIAL HEMORRHAGE (HCC): Status: ACTIVE | Noted: 2022-01-01

## 2022-12-16 NOTE — ED NOTES
Assumed care of pt, report received. Pt asleep on gurney. Respirations even, equal and unlabored. Family at bedside. Updated on plan of care. VS stable.

## 2022-12-16 NOTE — DISCHARGE PLANNING
Care Transition Team Assessment    Spoke with spouse in Count includes the [facility]. Patient was on service with [facility] until this AM. PCP Dr. Tian. Anticipate Hospice/Comfort Care.    Information Source  Information Given By: Other (Comments) (Chart review)    Readmission Evaluation  Is this a readmission?: No    Interdisciplinary Discharge Planning  Primary Care Physician: Dr. Tian  Lives with - Patient's Self Care Capacity: Spouse  Support Systems: Children, Spouse / Significant Other  Able to Return to Previous ADL's: No  Mobility Issues: Yes  Prior Services: Other (Comments) (Hospice)  Assistance Needed: Yes    Discharge Preparedness  What are your discharge supports?: Child, Spouse    Finances  Prescription Coverage: Yes    Anticipated Discharge Information  Discharge Disposition: D/T to hospice home (50)

## 2022-12-16 NOTE — HOSPICE
[facility] referral/consult response    Is this patient accepted to [facility]?: Yes  What hospice level of care?: routine home hospice  Approved by provider: Dr. Baez    Anticipated DC date: Monday 12/19  DC Barriers: Family agreement  Additional Information: Family given the decision to take her home today, this weekend, or Monday & they chose Monday.  Discussed signs of pain, they are now agreeable to her receiving pain medication.  They want the oxygen to stay on for now.  No DME is needed for d/c home.  Family does request a robison catheter on Monday before transport.

## 2022-12-16 NOTE — DISCHARGE SUMMARY
ED Observation Discharge Summary     Patient:Aimee Mauro  Patient : 1945  Patient MRN: [id number]  Patient PCP: José Tian D.O.     Admit Date: 2022  Discharge Date and Time: 22 11:31 AM  Discharge Diagnosis:   1. Intracranial hemorrhage (HCC)        2. Encephalopathy acute        3. Hyponatremia        4. Cystitis        5. Malignant neoplasm of body of uterus, unspecified site (HCC)  Referral to Hospice          Discharge Attending: Colleen Bonilla M.D.  Discharge Service: ED Observation     ED Course  Aimee is a 77 y.o. female who was evaluated at [facility] for head bleed and altered level of consciousness. Patient was signed out to me today, I consulted with family due to questions and concerns about patient being in hospice services.    Patient's son, daughter in law and  in room. Family was waiting for hospice/ intervention this morning. Her  received phone call from a  today.  She's been on hospice, with DNR/DNI. - She is now off of hospice to be able to receive evaluation in the hospital.  describes she needs medical coverage for treatment.  He is unsure and concerned about the services that will be provided to her, especially when she's at her worst in the middle of the night at home. Family's main focus is for her to be comfortable, together we can figure out home services. Hospice recommended that care was cancelled so she could be transferred and evaluated in the ED in the hopes that we can help fill in gaps in care.  reports that she would like to be home however he is concerned that he does not have the ability to care for her in her current state.  agrees but notes that he requires support in order to care for her effectively. -  is apprehensive to have inpatient hospice care, other family find it acceptable.  does not want to take her home without appropriate supports.  Patient has significant needs at  "this time.  Prior to her evaluation last night she was able to ambulate on her own toilet on her own and be able to go to dialysis.  Given these new intracranial bleeds she is no longer able to do any of that and will need a significant amount of support that is not set up at this time.  Therefore the plan is to admit her for comfort care to then coordinate appropriate hospice supports to include palliative care consult, PT and OT to determine patient's needs.    Discharge Exam:  BP (!) 156/72   Pulse (!) 103   Temp 37.5 °C (99.5 °F) (Rectal)   Resp 19   Ht 1.626 m (5' 4\")   Wt 73.5 kg (162 lb)   SpO2 98%   BMI 27.81 kg/m² .    Constitutional: Nonverbal protecting airway looking around the room intermittently  HENT:  Grossly normal  Eyes: Grossly normal  Neck: Normal range of motion  Thorax & Lungs: No respiratory distress  Skin:  No pathologic rash.   Extremities: Well perfused     Labs        Results for orders placed or performed during the hospital encounter of 12/16/22   CBC WITH DIFFERENTIAL   Result Value Ref Range     WBC 11.0 (H) 4.8 - 10.8 K/uL     RBC 3.75 (L) 4.20 - 5.40 M/uL     Hemoglobin 11.1 (L) 12.0 - 16.0 g/dL     Hematocrit 32.7 (L) 37.0 - 47.0 %     MCV 87.2 81.4 - 97.8 fL     MCH 29.6 27.0 - 33.0 pg     MCHC 33.9 33.6 - 35.0 g/dL     RDW 44.5 35.9 - 50.0 fL     Platelet Count 156 (L) 164 - 446 K/uL     MPV 8.8 (L) 9.0 - 12.9 fL     Neutrophils-Polys 87.80 (H) 44.00 - 72.00 %     Lymphocytes 6.40 (L) 22.00 - 41.00 %     Monocytes 4.90 0.00 - 13.40 %     Eosinophils 0.00 0.00 - 6.90 %     Basophils 0.40 0.00 - 1.80 %     Immature Granulocytes 0.50 0.00 - 0.90 %     Nucleated RBC 0.00 /100 WBC     Neutrophils (Absolute) 9.66 (H) 2.00 - 7.15 K/uL     Lymphs (Absolute) 0.70 (L) 1.00 - 4.80 K/uL     Monos (Absolute) 0.54 0.00 - 0.85 K/uL     Eos (Absolute) 0.00 0.00 - 0.51 K/uL     Baso (Absolute) 0.04 0.00 - 0.12 K/uL     Immature Granulocytes (abs) 0.06 0.00 - 0.11 K/uL     NRBC (Absolute) " 0.00 K/uL   COMP METABOLIC PANEL   Result Value Ref Range     Sodium 126 (L) 135 - 145 mmol/L     Potassium 5.2 3.6 - 5.5 mmol/L     Chloride 91 (L) 96 - 112 mmol/L     Co2 23 20 - 33 mmol/L     Anion Gap 12.0 7.0 - 16.0     Glucose 123 (H) 65 - 99 mg/dL     Bun 19 8 - 22 mg/dL     Creatinine 4.16 (H) 0.50 - 1.40 mg/dL     Calcium 8.2 (L) 8.5 - 10.5 mg/dL     AST(SGOT) 24 12 - 45 U/L     ALT(SGPT) 9 2 - 50 U/L     Alkaline Phosphatase 99 30 - 99 U/L     Total Bilirubin 0.2 0.1 - 1.5 mg/dL     Albumin 2.3 (L) 3.2 - 4.9 g/dL     Total Protein 5.9 (L) 6.0 - 8.2 g/dL     Globulin 3.6 (H) 1.9 - 3.5 g/dL     A-G Ratio 0.6 g/dL   TROPONIN   Result Value Ref Range     Troponin T 69 (H) 6 - 19 ng/L   LACTIC ACID   Result Value Ref Range     Lactic Acid 1.6 0.5 - 2.0 mmol/L   URINALYSIS (UA)     Specimen: Urine, Cath   Result Value Ref Range     Color DK Yellow       Character Clear       Specific Gravity >=1.045 (A) <1.035     Ph 6.5 5.0 - 8.0     Glucose Negative Negative mg/dL     Ketones Trace (A) Negative mg/dL     Protein >=1000 (A) Negative mg/dL     Bilirubin Negative Negative     Urobilinogen, Urine 0.2 Negative     Nitrite Negative Negative     Leukocyte Esterase Small (A) Negative     Occult Blood Large (A) Negative     Micro Urine Req Microscopic     MAGNESIUM   Result Value Ref Range     Magnesium 2.0 1.5 - 2.5 mg/dL   PHOSPHORUS   Result Value Ref Range     Phosphorus 4.3 2.5 - 4.5 mg/dL   ESTIMATED GFR   Result Value Ref Range     GFR (CKD-EPI) 10 (A) >60 mL/min/1.73 m 2   CORRECTED CALCIUM   Result Value Ref Range     Correct Calcium 9.6 8.5 - 10.5 mg/dL   URINE MICROSCOPIC (W/UA)   Result Value Ref Range     WBC 20-50 (A) /hpf     RBC >150 (A) /hpf     Bacteria Negative None /hpf     Epithelial Cells Many (A) /hpf     Epithelial Cells Renal Few /hpf     Hyaline Cast 11-20 (A) /lpf   POCT glucose device results   Result Value Ref Range     POC Glucose, Blood 105 (H) 65 - 99 mg/dL          Radiology  DX-CHEST-PORTABLE (1 VIEW)   Final Result           1.  Pulmonary edema and/or infiltrates   2.  Moderate left pleural effusion   3.  Cardiomegaly   4.  Atherosclerosis       CT-HEAD W/O   Final Result           1.  Left temporal parenchymal hemorrhage with surrounding vasogenic edema. Could represent hemorrhagic neoplastic lesion or parenchymal bleed.   2.  Left frontal, parietal, temporal, and occipital subarachnoid hemorrhages.   3.  Partial effacement left ventricle without significant midline shift appreciated.   4.  Atherosclerosis.       These findings were discussed with the patient's clinician, Asher Mendoza, on 12/16/2022 7:06 AM.                Medications:       New Prescriptions     No medications on file         Discharge Condition: Stable     Electronically signed by: Colleen Bonilla M.D., 12/16/2022 11:31 AM

## 2022-12-16 NOTE — PROGRESS NOTES
ED Provider Progress Note    Son, daughter in law and  in room. Family was waiting for hospice/ intervention this morning.     received phone call from a .     She's been on hospice, with DNR/DNI. - She is now off of hospice.  describes she needs medical coverage for treatment.      is confused as to what further plans can be made for her. He is unsure and concerned about the services that will be provided to her, especially when she's at her worst in the middle of the night at home.    Family's main focus is for her to be comfortable, together we can figure out home services to assist through those episodes.     Hospice recommended that care was cancelled so she could be transferred and evaluated in the ED in the hopes that we can help fill in gaps in care.      reports that she would like to be home.  agrees but notes that he requires support in order to care for her effectively. -  is apprehensive to have inpatient hospice care, other family find it acceptable. does not want to take her home without appropriate supports.    Son says we are ready for comfort care. She's had something like a stroke situation. He says it's unlikely that she recovers, and that we have to wait to see what comes of it.    The plan is to admit her for comfort care to then coordinate appropriate hospice supports to include palliative care consult, PT and OT to determine patient's needs.

## 2022-12-16 NOTE — ED NOTES
Checked pt to see if she was soiled, no incontinence at this time.   Family remains at bedside. Will cont to monitor.

## 2022-12-16 NOTE — ED PROVIDER NOTES
"ED Provider Note    CHIEF COMPLAINT  Chief Complaint   Patient presents with    ALOC       HPI  Aimee Jeannine Mauro is a 77 y.o. 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  Patient was apparently in her normal state of health when she went to the bathroom sometime last night and was noted to be taking an extra long time.  Additionally, her  notes that he heard her \"banging around\" and decided to check on her.  He found her in slumped up against the wall with activity that he described as seizure-like.    REVIEW OF SYSTEMS  Unable to obtain    PAST FAM HISTORY  Family History   Problem Relation Age of Onset    Cancer Mother        PAST MEDICAL HISTORY   has a past medical history of CKD (chronic kidney disease), stage IV (HCC), Congestive heart failure (HCC), Insomnia disorder, and Stroke (HCC) (01/01/1978).    SOCIAL HISTORY  Social History     Tobacco Use    Smoking status: Never    Smokeless tobacco: Never   Vaping Use    Vaping Use: Never used   Substance and Sexual Activity    Alcohol use: Yes     Comment: 1-2 glasses of wine per week.    Drug use: No    Sexual activity: Not on file       SURGICAL HISTORY   has a past surgical history that includes tubal coagulation laparoscopic bilateral.    CURRENT MEDICATIONS  Home Medications    **Home medications have not yet been reviewed for this encounter**         ALLERGIES  No Known Allergies    PHYSICAL EXAM  VITAL SIGNS: BP (!) 165/86   Pulse 92   Temp 37.5 °C (99.5 °F) (Rectal)   Resp 16   Ht 1.626 m (5' 4\")   Wt 73.5 kg (162 lb)   SpO2 93%   BMI 27.81 kg/m²    Gen: Stuporous, generally unresponsive  HEENT: No signs of trauma, Bilateral external ears normal, Nose normal. Conjunctiva normal, Non-icteric.  Pupils equal bilaterally  Neck: JVD or tracheal deviation  Cardiovascular: Cardia with regular rhythm.  Capillary refill less than 3 seconds to all extremities, " 2+ distal pulses.  Thorax & Lungs: Normal breath sounds, No respiratory distress, No wheezing bilateral chest rise  Abdomen: Normal bowel sounds, soft, no masses.  No apparent guarding.    Skin: Warm, Dry, No erythema, No rash noted to exposed areas.   Extremities: Intact distal pulses, pitting edema noted to lower extremities.  Brawny changes which suggest chronic  Neurologic: GCS 8        LABS  Results for orders placed or performed during the hospital encounter of 12/16/22   CBC WITH DIFFERENTIAL   Result Value Ref Range    WBC 11.0 (H) 4.8 - 10.8 K/uL    RBC 3.75 (L) 4.20 - 5.40 M/uL    Hemoglobin 11.1 (L) 12.0 - 16.0 g/dL    Hematocrit 32.7 (L) 37.0 - 47.0 %    MCV 87.2 81.4 - 97.8 fL    MCH 29.6 27.0 - 33.0 pg    MCHC 33.9 33.6 - 35.0 g/dL    RDW 44.5 35.9 - 50.0 fL    Platelet Count 156 (L) 164 - 446 K/uL    MPV 8.8 (L) 9.0 - 12.9 fL    Neutrophils-Polys 87.80 (H) 44.00 - 72.00 %    Lymphocytes 6.40 (L) 22.00 - 41.00 %    Monocytes 4.90 0.00 - 13.40 %    Eosinophils 0.00 0.00 - 6.90 %    Basophils 0.40 0.00 - 1.80 %    Immature Granulocytes 0.50 0.00 - 0.90 %    Nucleated RBC 0.00 /100 WBC    Neutrophils (Absolute) 9.66 (H) 2.00 - 7.15 K/uL    Lymphs (Absolute) 0.70 (L) 1.00 - 4.80 K/uL    Monos (Absolute) 0.54 0.00 - 0.85 K/uL    Eos (Absolute) 0.00 0.00 - 0.51 K/uL    Baso (Absolute) 0.04 0.00 - 0.12 K/uL    Immature Granulocytes (abs) 0.06 0.00 - 0.11 K/uL    NRBC (Absolute) 0.00 K/uL   COMP METABOLIC PANEL   Result Value Ref Range    Sodium 126 (L) 135 - 145 mmol/L    Potassium 5.2 3.6 - 5.5 mmol/L    Chloride 91 (L) 96 - 112 mmol/L    Co2 23 20 - 33 mmol/L    Anion Gap 12.0 7.0 - 16.0    Glucose 123 (H) 65 - 99 mg/dL    Bun 19 8 - 22 mg/dL    Creatinine 4.16 (H) 0.50 - 1.40 mg/dL    Calcium 8.2 (L) 8.5 - 10.5 mg/dL    AST(SGOT) 24 12 - 45 U/L    ALT(SGPT) 9 2 - 50 U/L    Alkaline Phosphatase 99 30 - 99 U/L    Total Bilirubin 0.2 0.1 - 1.5 mg/dL    Albumin 2.3 (L) 3.2 - 4.9 g/dL    Total Protein 5.9 (L)  6.0 - 8.2 g/dL    Globulin 3.6 (H) 1.9 - 3.5 g/dL    A-G Ratio 0.6 g/dL   TROPONIN   Result Value Ref Range    Troponin T 69 (H) 6 - 19 ng/L   LACTIC ACID   Result Value Ref Range    Lactic Acid 1.6 0.5 - 2.0 mmol/L   URINALYSIS (UA)    Specimen: Urine, Cath   Result Value Ref Range    Color DK Yellow     Character Clear     Specific Gravity >=1.045 (A) <1.035    Ph 6.5 5.0 - 8.0    Glucose Negative Negative mg/dL    Ketones Trace (A) Negative mg/dL    Protein >=1000 (A) Negative mg/dL    Bilirubin Negative Negative    Urobilinogen, Urine 0.2 Negative    Nitrite Negative Negative    Leukocyte Esterase Small (A) Negative    Occult Blood Large (A) Negative    Micro Urine Req Microscopic    MAGNESIUM   Result Value Ref Range    Magnesium 2.0 1.5 - 2.5 mg/dL   PHOSPHORUS   Result Value Ref Range    Phosphorus 4.3 2.5 - 4.5 mg/dL   ESTIMATED GFR   Result Value Ref Range    GFR (CKD-EPI) 10 (A) >60 mL/min/1.73 m 2   CORRECTED CALCIUM   Result Value Ref Range    Correct Calcium 9.6 8.5 - 10.5 mg/dL   URINE MICROSCOPIC (W/UA)   Result Value Ref Range    WBC 20-50 (A) /hpf    RBC >150 (A) /hpf    Bacteria Negative None /hpf    Epithelial Cells Many (A) /hpf    Epithelial Cells Renal Few /hpf    Hyaline Cast 11-20 (A) /lpf   POCT glucose device results   Result Value Ref Range    POC Glucose, Blood 105 (H) 65 - 99 mg/dL       RADIOLOGY  DX-CHEST-PORTABLE (1 VIEW)   Final Result         1.  Pulmonary edema and/or infiltrates   2.  Moderate left pleural effusion   3.  Cardiomegaly   4.  Atherosclerosis      CT-HEAD W/O   Final Result         1.  Left temporal parenchymal hemorrhage with surrounding vasogenic edema. Could represent hemorrhagic neoplastic lesion or parenchymal bleed.   2.  Left frontal, parietal, temporal, and occipital subarachnoid hemorrhages.   3.  Partial effacement left ventricle without significant midline shift appreciated.   4.  Atherosclerosis.      These findings were discussed with the patient's  clinician, Asher Mendoza, on 12/16/2022 7:06 AM.          Critical Care Note  Upon my evaluation, this patient had high probability of imminent and life-threatening deterioration due to intracranial hemorrhage, encephalopathy, which required my direct attention, intervention, and personal management. I personally provided 45 minutes of critical care time exclusive of time spent on separately billable procedures. Time includes review of laboratory data, radiology results, discussion with consultants, and monitoring for potential decompensation.      COURSE & MEDICAL DECISION MAKING  Patient arrives for evaluation of what appears to be altered level of consciousness which is concerning considering her recent diagnosis of endometrial adenocarcinoma.  Notable patient is on hospice but has a limited interventions advanced directive.  Notable she is DNR and DNI.  After discussion with the patient's , will perform diagnostics to determine the cause of her acute decompensation as it will have prognostic value.  And particular concern for head bleed.  It is notable that she does not have any significant findings to suggest trauma to the head.   6:58 AM  Discussed CT findings with the patient's  and daughter who are at bedside.  They seem very understanding of the situation and the fact that this is a devastating injury.  We will keep the patient comfortable as possible while awaiting the hospice MD and  to return to help evaluate situation and determine whether the patient is reasonable for discharge to the family or whether she should be admitted for expectant / comfort care.      7:06 AM  Discussed CT findings with the radiologist.  Unclear if this is from metastases and a spontaneous bleed or from trauma.  Again, it is not necessarily important to determine what caused it but it is important to determine what needs to happen now.  Will await hospice physician and  to present to the  emergency department to help with disposition.  At this point the patient's daughter and  state clear understanding that this is likely a devastating injury and that comfort care is very reasonable.  At this point we will not attempt any other interventions aside from comfort measures in the emergency department.  I do not feel neurosurgical consultation will benefit the patient or  in any way.  Patient will be turned over to oncoming physician, Dr. Bonilla, pending disposition.    Patient placed on ED observation at 7:10 AM pending hospice MD and  evaluation to determine disposition.  Unclear whether the patient will need to be admitted to the hospital for comfort care will be able to be taken home.    FINAL IMPRESSION  1. Intracranial hemorrhage (HCC)    2. Encephalopathy acute    3. Hyponatremia    4. Cystitis        Electronically signed by: Asher Mendoza M.D., 12/16/2022 5:22 AM

## 2022-12-16 NOTE — ASSESSMENT & PLAN NOTE
CT head showed left temporal parenchymal hemorrhage with surrounding vasogenic edema (unclear if hemorrhagic neoplastic lesion or parenchymal bleed), left frontal, parietal, temporal and occipital subarachnoid hemorrhages.  Poor prognosis.  Transition to comfort care.  Hospice consulted.

## 2022-12-16 NOTE — DISCHARGE PLANNING
Received request for Hospice. Spoke with spouse at bedside and per spouse patient was on Hospice until this AM.  was under impression that patient would remain in hospital on comfort care. Spoke with Joseph BROWN and again spoke with spouse with [facility]. Joseph BROWN will contact Dr Brumfield for Hospice order clarification. Updated by Joseph BROWN that Dr. Brumfield wants both comfort care and Hospice.

## 2022-12-16 NOTE — ED NOTES
Per MD, ok to place EJ due to lack of peripheral IV access. Right EJ placed. Labs sent. Patient straight cathed per ERP. UA sent to Lab.     Patient now to CT.

## 2022-12-16 NOTE — DISCHARGE PLANNING
Spoke with spouse with [facility] BROWN and dicussed Hospice and that MD did want Hospice referral. Received Verbal consent from spouse to go ahead and send Hospice referral. Message sent to Marcos ANDREA to send out Hospice referral.

## 2022-12-16 NOTE — H&P
Hospital Medicine History & Physical Note    Date of Service  12/16/2022    Primary Care Physician  José Tian D.O.    Consultants  Hospice    Code Status  Comfort Care/DNR    Chief Complaint  Chief Complaint   Patient presents with    ALOC       History of Presenting Illness  Aimee Mauro is a 77 y.o. female who presented 12/16/2022 with decreased level of consciousness.  Mrs. Mauro has a past medical history of stage IV uterine cancer that has been on hospice though also undergoing outpatient dialysis.  She was in her usual state of health yesterday and then this morning was brought in by her  due to decreased level of consciousness.  A CAT scan of the head was done revealing a left temporal parenchymal hemorrhage with surrounding vasogenic edema which could represent hemorrhagic neoplastic lesion or bleed.  She was also found to have a left frontal, parietal temporal, and occipital subarachnoid hemorrhages.  I met with her  and son as well as daughter-in-law at bedside and showed them the images of her CAT scan.  She will be placed under observation for comfort care measures and hospice will be reconsulted.    I discussed the plan of care with Dr. Bonilla in person.     Review of Systems  Review of Systems   Unable to perform ROS: Mental acuity     Past Medical History   has a past medical history of CKD (chronic kidney disease), stage IV (HCC), Congestive heart failure (HCC), Insomnia disorder, and Stroke (HCC) (01/01/1980).    Surgical History   has a past surgical history that includes tubal coagulation laparoscopic bilateral.     Family History  family history includes Cancer in her mother.     Social History   reports that she has never smoked. She has never used smokeless tobacco. She reports current alcohol use. She reports that she does not use drugs.lives with her     Allergies  No Known Allergies    Medications  Prior to Admission Medications   Prescriptions Last Dose  Informant Patient Reported? Taking?   LORazepam (ATIVAN) 2 MG/ML Conc UNK at [facility] Home Pharm[facility] No No   Sig: Take 0.25 mL by mouth every four hours as needed (mild to moderate agiation/anxiety/shortness of breath; call hospice if inadequate [phone]).   acetaminophen (TYLENOL) 500 MG Tab UNK at [facility] Home Pharm[facility] No No   Sig: Take 1 Tablet by mouth every 4 hours as needed for mild pain/fever   acetaminophen (TYLENOL) 650 MG Suppos UNK at [facility] Home Pharm[facility] No No   Sig: Unwrap and insert 1 Suppository into the rectum every 6 hours as needed for mild pain/fever if unable to swallow.   bisacodyl (DULCOLAX) 10 MG Suppos UNK at [facility] Home Pharm[facility] No No   Sig: Unwrap and insert 1 suppository daily as needed if no bowel movements for 3 days.   calcium acetate (PHOS-LO) 667 MG Tab tablet UNK at [facility] Pharm[facility] No No   Sig: Take 1 Tablet by mouth 3 times a day before meals. Indications: Hyperphosphatemia D/T Renal Insufficiency   carvedilol (COREG) 3.125 MG Tab UNK at [facility] Home Pharm[facility] No No   Sig: Take 1 Tablet by mouth 2 times a day with meals for hypertension   furosemide (LASIX) 80 MG Tab UNK at [facility] No No   Sig: Take 1 Tablet by mouth 2 times a day for edema.   ondansetron (ZOFRAN ODT) 4 MG TABLET DISPERSIBLE UNK at [facility] Home Pharm[facility] No No   Sig: Take 1 Tablet by mouth every 4 hours as needed for nausea/vomiting.   oxyCODONE 20 MG/ML Conc UNK at [facility] Home Pharm[facility] No No   Sig: Take 0.25 mL by mouth every 2 hours as needed (mild to moderate pain/anxiety/shortness of breath; please call hospice if inadequate--[phone]).   senna-docusate (PERICOLACE OR SENOKOT S) 8.6-50 MG Tab UNK at [facility] No No   Sig: Take 2 Tablets by mouth 2 times a day as needed for constipation. Hold for loose stool.   traZODone (DESYREL) 100 MG Tab UNK at [facility] Pharm[facility] No No   Sig: Take 1 Tablet by mouth at bedtime  as needed for sleep.      Facility-Administered Medications: None       Physical Exam  Temp:  [37.5 °C (99.5 °F)] 37.5 °C (99.5 °F)  Pulse:  [] 79  Resp:  [15-24] 15  BP: (144-165)/(65-86) 144/65  SpO2:  [90 %-99 %] 95 %  Blood Pressure : (!) 144/65   Temperature: 37.5 °C (99.5 °F)   Pulse: 79   Respiration: 15   Pulse Oximetry: 95 %       Physical Exam  Vitals and nursing note reviewed.   Constitutional:       General: She is in acute distress.      Appearance: She is ill-appearing and toxic-appearing.   HENT:      Head: Normocephalic and atraumatic.      Mouth/Throat:      Mouth: Mucous membranes are dry.      Pharynx: Oropharynx is clear.   Eyes:      General: No scleral icterus.     Conjunctiva/sclera: Conjunctivae normal.      Comments: She had corneal reflexes   Neck:      Comments: Right chest temporary dialysis catheter  Cardiovascular:      Rate and Rhythm: Normal rate and regular rhythm.   Pulmonary:      Comments: tachypnea  Abdominal:      General: There is no distension.      Tenderness: There is no abdominal tenderness.   Musculoskeletal:      Right lower leg: No edema.      Left lower leg: No edema.   Skin:     General: Skin is warm and dry.      Coloration: Skin is pale.   Neurological:      Comments: She does not open her eyes to stimulation nor voice  She withdraws to pain of the extremities   Psychiatric:      Comments: Non-verbal       Laboratory:  Recent Labs     12/16/22  0610   WBC 11.0*   RBC 3.75*   HEMOGLOBIN 11.1*   HEMATOCRIT 32.7*   MCV 87.2   MCH 29.6   MCHC 33.9   RDW 44.5   PLATELETCT 156*   MPV 8.8*     Recent Labs     12/16/22  0610   SODIUM 126*   POTASSIUM 5.2   CHLORIDE 91*   CO2 23   GLUCOSE 123*   BUN 19   CREATININE 4.16*   CALCIUM 8.2*     Recent Labs     12/16/22  0610   ALTSGPT 9   ASTSGOT 24   ALKPHOSPHAT 99   TBILIRUBIN 0.2   GLUCOSE 123*         No results for input(s): NTPROBNP in the last 72 hours.      Recent Labs     12/16/22  0610   TROPONINT 69*        Imaging:  DX-CHEST-PORTABLE (1 VIEW)   Final Result         1.  Pulmonary edema and/or infiltrates   2.  Moderate left pleural effusion   3.  Cardiomegaly   4.  Atherosclerosis      CT-HEAD W/O   Final Result         1.  Left temporal parenchymal hemorrhage with surrounding vasogenic edema. Could represent hemorrhagic neoplastic lesion or parenchymal bleed.   2.  Left frontal, parietal, temporal, and occipital subarachnoid hemorrhages.   3.  Partial effacement left ventricle without significant midline shift appreciated.   4.  Atherosclerosis.      These findings were discussed with the patient's clinician, Asher Mendoza, on 12/16/2022 7:06 AM.              Assessment/Plan:  Justification for Admission Status  I anticipate this patient is appropriate for observation status at this time because to arrange for home on Hospice.    Patient will need a Med/Surg bed on MEDICAL service .  The need is secondary to as above.    * Intracranial hemorrhage (HCC)- (present on admission)  Assessment & Plan  Consistent with brain metastasis and intracranial hemorrhage  She is severely encephalopathic   Prognosis is poor  Comfort care measures. Hospice will be re-consulted to see if she improves enough to go home.  IV morphine for pain or air-hunger.    Malignant neoplasm of body of uterus (HCC)- (present on admission)  Assessment & Plan  Followed by Dr. Ames gyn/onc.  Stage IV    ESRD (end stage renal disease) (HCC)- (present on admission)  Assessment & Plan  She is on comfort care thus refrain from dialysis      VTE prophylaxis: not indicated

## 2022-12-16 NOTE — DISCHARGE PLANNING
Received Choice form at [id number]  Agency/Facility Name: [facility]   Referral sent per Choice form @ [phone]

## 2022-12-16 NOTE — PROGRESS NOTES
Patient arrived to unit with  with emergency department. Patient transferred to hospital bed. Patient has unlabored breathing, responds to painful stimuli but is nonvocal, pupils equal and reactive.

## 2022-12-16 NOTE — ED NOTES
Ordered a hospital bed but then was told we don't have any more for the ER.    Pt linen and gown changed. New clean pads applied under pt. Pt able to follow minimal commands. Non-verbal.     Turned onto RT side.    Report to Luli BROWN.

## 2022-12-16 NOTE — ED TRIAGE NOTES
"Chief Complaint   Patient presents with    ALOC       BIB EMS to [facility], pt on monitor and in gown. Patient coming in from home, pt's  called, reports pt is altered. Last known well 5-6 hours, unknown baseline. Initially patient had L sided deficits, but pt is moving all extremities on arrival. Pt is on hospice and is a DNR. Pt is a dialysis patient M/W/Fri. Patient is hypoxic on room air 87% , placed pt on 2L of oxygen NC. Pt has a history of uterine cancer. Pt's  report. pt locked herself in the bathroom, found her on the floor altered about 5 hours ago. Dialysis cath on R upper chest. FSBS 143. GCS 7.         BP (!) 152/79   Pulse (!) 103   Resp 20   Ht 1.626 m (5' 4\")   Wt 73.5 kg (162 lb)   SpO2 90%   BMI 27.81 kg/m²    "

## 2022-12-17 NOTE — PROGRESS NOTES
Report received from KEVIN Min.  Assumed care of patient.  Assessment complete.  Plan of care gone over with the patient's family and all concerns addressed.  Patient resting in bed.  Will reposition q 2 hrs. Pt will arouse to her name and painful stimuli, but is nonverbal.  Family at bedside.  No apparent signs of distress.  Safety precautions in place.  Family educated to call for assistance.  Hourly rounding in place.

## 2022-12-17 NOTE — CARE PLAN
The patient is Stable - Low risk of patient condition declining or worsening    Shift Goals  Clinical Goals: comfort care, hospice  Patient Goals: BRIJESH (pt currently non-verbal)  Family Goals: pt comfort    Progress made toward(s) clinical / shift goals:    Problem: Knowledge Deficit - Standard  Goal: Patient and family/care givers will demonstrate understanding of plan of care, disease process/condition, diagnostic tests and medications  Outcome: Not Progressing     Problem: Pain - Standard  Goal: Alleviation of pain or a reduction in pain to the patient’s comfort goal  Outcome: Not Progressing       Patient is not progressing towards the following goals:      Problem: Knowledge Deficit - Standard  Goal: Patient and family/care givers will demonstrate understanding of plan of care, disease process/condition, diagnostic tests and medications  Outcome: Not Progressing     Problem: Pain - Standard  Goal: Alleviation of pain or a reduction in pain to the patient’s comfort goal  Outcome: Not Progressing

## 2022-12-17 NOTE — PROGRESS NOTES
Hospital Medicine Daily Progress Note    Date of Service  12/17/2022    Chief Complaint  Aimee Mauro is a 77 y.o. female admitted 12/16/2022 with change of mental status following fall/seizure-like activity and acute intracranial parenchymal hemorrhage.    Hospital Course  Aimee Mauro is a 77 y.o. female who presented 12/16/2022 with decreased level of consciousness.  Mrs. Mauro has a past medical history of stage IV uterine cancer that has been on hospice though also undergoing outpatient dialysis.  She was in her usual state of health yesterday and then this morning was brought in by her  due to decreased level of consciousness.  A CAT scan of the head was done revealing a left temporal parenchymal hemorrhage with surrounding vasogenic edema which could represent hemorrhagic neoplastic lesion or bleed.  She was also found to have a left frontal, parietal temporal, and occipital subarachnoid hemorrhages.  I met with her  and son as well as daughter-in-law at bedside and showed them the images of her CAT scan.  She will be placed under observation for comfort care measures and hospice will be reconsulted.    Interval Problem Update  Morphine changed from as needed to every 3 hours standing doses  Family at bedside    I have discussed this patient's plan of care and discharge plan at IDT rounds today with Case Management, Nursing, Nursing leadership, and other members of the IDT team.    Consultants/Specialty  Hospice    Code Status  Comfort Care/DNR    Disposition  Patient is not medically cleared for discharge.   Anticipate discharge to to hospice.  I have placed the appropriate orders for post-discharge needs.    Review of Systems  Review of Systems   Unable to perform ROS: Mental status change      Physical Exam  Temp:  [36.3 °C (97.4 °F)-36.5 °C (97.7 °F)] 36.5 °C (97.7 °F)  Pulse:  [79-95] 90  Resp:  [15-20] 18  BP: (144-174)/(65-80) 163/80  SpO2:  [93 %-96 %] 93 %    Physical  Exam  Constitutional:       General: She is not in acute distress.     Appearance: She is not toxic-appearing.      Comments: Unresponsive   HENT:      Mouth/Throat:      Mouth: Mucous membranes are dry.   Eyes:      Comments: Positive corneal reflexes   Cardiovascular:      Rate and Rhythm: Normal rate and regular rhythm.   Pulmonary:      Comments: Diaphragmatic breathing  Chest:      Comments: HD cath in place      Fluids  No intake or output data in the 24 hours ending 12/17/22 1111    Laboratory  Recent Labs     12/16/22  0610   WBC 11.0*   RBC 3.75*   HEMOGLOBIN 11.1*   HEMATOCRIT 32.7*   MCV 87.2   MCH 29.6   MCHC 33.9   RDW 44.5   PLATELETCT 156*   MPV 8.8*     Recent Labs     12/16/22  0610   SODIUM 126*   POTASSIUM 5.2   CHLORIDE 91*   CO2 23   GLUCOSE 123*   BUN 19   CREATININE 4.16*   CALCIUM 8.2*                   Imaging  DX-CHEST-PORTABLE (1 VIEW)   Final Result         1.  Pulmonary edema and/or infiltrates   2.  Moderate left pleural effusion   3.  Cardiomegaly   4.  Atherosclerosis      CT-HEAD W/O   Final Result         1.  Left temporal parenchymal hemorrhage with surrounding vasogenic edema. Could represent hemorrhagic neoplastic lesion or parenchymal bleed.   2.  Left frontal, parietal, temporal, and occipital subarachnoid hemorrhages.   3.  Partial effacement left ventricle without significant midline shift appreciated.   4.  Atherosclerosis.      These findings were discussed with the patient's clinician, Asher Mendoza, on 12/16/2022 7:06 AM.           Assessment/Plan  * Intracranial hemorrhage (HCC)- (present on admission)  Assessment & Plan  Consistent with brain metastasis and intracranial hemorrhage  She is severely encephalopathic   Prognosis is poor  Comfort care measures. Hospice will be re-consulted to see if she improves enough to go home.  IV morphine for pain or air-hunger.    IV morphine ordered 2 mg Q3H standing    Malignant neoplasm of body of uterus (HCC)- (present on  admission)  Assessment & Plan  Followed by Dr. Ames gyn/onc.  Stage IV    ESRD (end stage renal disease) (HCC)- (present on admission)  Assessment & Plan  She is on comfort care thus refrain from dialysis         VTE prophylaxis: pharmacologic prophylaxis contraindicated due to comfort care only    I have performed a physical exam and reviewed and updated ROS and Plan today (12/17/2022). In review of yesterday's note (12/16/2022), there are no changes except as documented above.

## 2022-12-17 NOTE — PROGRESS NOTES
4 Eyes Skin Assessment Completed by Netta, KEVIN and KEVIN Ruiz.    Head WDL  Ears WDL  Nose WDL  Mouth WDL  Neck WDL  Breast/Chest WDL  Shoulder Blades WDL  Spine WDL  (R) Arm/Elbow/Hand WDL  (L) Arm/Elbow/Hand WDL  Abdomen WDL  Groin WDL  Scrotum/Coccyx/Buttocks Redness and Blanching  (R) Leg Redness  (L) Leg WDL  (R) Heel/Foot/Toe 4th toe over big toe with dry callous.   (L) Heel/Foot/Toe WDL          Devices In Places Pulse Ox      Interventions In Place Q2 Turns and Pressure Redistribution Mattress    Possible Skin Injury No    Pictures Uploaded Into Epic N/A  Wound Consult Placed N/A  RN Wound Prevention Protocol Ordered No

## 2022-12-18 NOTE — PROGRESS NOTES
Hospital Medicine Daily Progress Note    Date of Service  12/18/2022    Chief Complaint  Aimee Mauro is a 77 y.o. female admitted 12/16/2022 with altered level of consciousness after a fall with seizure-like activity.  She has stage IV uterine cancer, ESRD and is undergoing outpatient dialysis. She has previously been on hospice.    Hospital Course  CT head showed left temporal parenchymal hemorrhage with surrounding vasogenic edema (unclear if hemorrhagic neoplastic lesion or parenchymal bleed), left frontal, parietal, temporal and occipital subarachnoid hemorrhages.     After an extensive discussion with patient's , son and daughter-in-law, patient was transitioned to comfort care and hospice was reconsulted.    Interval Problem Update  Stable. Slightly opens eyes, does not communicate or follow commands.    I have discussed this patient's plan of care and discharge plan at IDT rounds today with Case Management, Nursing, Nursing leadership, and other members of the IDT team.    Consultants/Specialty  Hospice    Code Status  Comfort Care/DNR    Disposition  Patient is medically cleared for discharge.   Anticipate discharge to to hospice.  I have placed the appropriate orders for post-discharge needs.    Review of Systems  Review of Systems   Unable to perform ROS: Acuity of condition      Physical Exam  Temp:  [36.9 °C (98.5 °F)] 36.9 °C (98.5 °F)  Pulse:  [74] 74  Resp:  [18] 18  BP: (139)/(64) 139/64  SpO2:  [99 %] 99 %    Physical Exam  Constitutional:       Appearance: She is ill-appearing.   HENT:      Head: Normocephalic.      Nose: Nose normal.      Mouth/Throat:      Mouth: Mucous membranes are dry.   Eyes:      Comments: Unable to assess, does not follow commands   Cardiovascular:      Rate and Rhythm: Normal rate.   Pulmonary:      Effort: Pulmonary effort is normal.   Abdominal:      Palpations: Abdomen is soft.   Musculoskeletal:      Right lower leg: Edema present.      Left lower leg:  Edema present.   Skin:     General: Skin is warm.   Neurological:      Mental Status: She is disoriented.      Comments: Does not follow commands   Psychiatric:      Comments: Calm       Fluids  No intake or output data in the 24 hours ending 12/19/22 1054    Laboratory                            Imaging  DX-CHEST-PORTABLE (1 VIEW)   Final Result         1.  Pulmonary edema and/or infiltrates   2.  Moderate left pleural effusion   3.  Cardiomegaly   4.  Atherosclerosis      CT-HEAD W/O   Final Result         1.  Left temporal parenchymal hemorrhage with surrounding vasogenic edema. Could represent hemorrhagic neoplastic lesion or parenchymal bleed.   2.  Left frontal, parietal, temporal, and occipital subarachnoid hemorrhages.   3.  Partial effacement left ventricle without significant midline shift appreciated.   4.  Atherosclerosis.      These findings were discussed with the patient's clinician, Asher Mendoaz, on 12/16/2022 7:06 AM.           Assessment/Plan  * Intracranial hemorrhage (HCC)- (present on admission)  Assessment & Plan  CT head showed left temporal parenchymal hemorrhage with surrounding vasogenic edema (unclear if hemorrhagic neoplastic lesion or parenchymal bleed), left frontal, parietal, temporal and occipital subarachnoid hemorrhages.  Poor prognosis.  Transition to comfort care.  Hospice consulted.    ESRD (end stage renal disease) (HCC)- (present on admission)  Assessment & Plan  Comfort care measures only    Malignant neoplasm of body of uterus (HCC)- (present on admission)  Assessment & Plan  Stage IV uterine cancer.  Has been followed by Dr. Ames.  On comfort care       VTE prophylaxis: Comfort Care

## 2022-12-18 NOTE — PROGRESS NOTES
I called to give report to KEVIN Wade who will be getting this patient in R311.  The RN said she is in the middle of wound care, and she will call me back.

## 2022-12-18 NOTE — CARE PLAN
The patient is Stable - Low risk of patient condition declining or worsening    Shift Goals  Q2HR TURN AND REPOSITION, SKIN INTEGRITY  Clinical Goals: comfort care, hospice  Patient Goals: BRIJESH (pt currently non-verbal)  Family Goals: pt comfort    Progress made toward(s) clinical / shift goals: COMFORT MEASURES    Patient is not progressing towards the following goals:

## 2022-12-19 NOTE — DISCHARGE PLANNING
Received Choice form at 1770  Agency/Facility Name: [facility]   Referral sent per Choice form @ [phone]

## 2022-12-19 NOTE — CARE PLAN
The patient is Watcher - Medium risk of patient condition declining or worsening    Shift Goals  Clinical Goals: skin integrity  Patient Goals: pt unable to state  Family Goals: comfort    Progress made toward(s) clinical / shift goals:    Problem: Skin Integrity  Goal: Skin integrity is maintained or improved  Outcome: Progressing  Note: Pt repositioned throughout shift, purewick in use. Pt skin remains intact. Heels floated with pillows.      Problem: Respiratory - Comfort Care  Goal: Patient's respiratory function will be supported  12/18/2022 1932 by Amy Herrmann R.N.  Outcome: Progressing  12/18/2022 1930 by Amy Herrmann R.N.  Note: Family wanting oxygen to remain on for now for patient's comfort.        Patient is not progressing towards the following goals:

## 2022-12-19 NOTE — PROGRESS NOTES
Hospital Medicine Daily Progress Note    Date of Service  12/19/2022    Chief Complaint  Aimee Mauro is a 77 y.o. female admitted 12/16/2022 with altered level of consciousness after a fall with seizure-like activity.  She has stage IV uterine cancer, ESRD and is undergoing outpatient dialysis. She has previously been on hospice.    Hospital Course  CT head showed left temporal parenchymal hemorrhage with surrounding vasogenic edema (unclear if hemorrhagic neoplastic lesion or parenchymal bleed), left frontal, parietal, temporal and occipital subarachnoid hemorrhages.     After an extensive discussion with patient's , son and daughter-in-law, patient was transitioned to comfort care and hospice was reconsulted.    Interval Problem Update  Stable. Had episode of agitation this morning. Slightly opens eyes, does not communicate or follow commands.    I have discussed this patient's plan of care and discharge plan at IDT rounds today with Case Management, Nursing, Nursing leadership, and other members of the IDT team.    Consultants/Specialty  Hospice    Code Status  Comfort Care/DNR    Disposition  Patient is medically cleared for discharge.   Anticipate discharge to to hospice.  I have placed the appropriate orders for post-discharge needs.    Review of Systems  Review of Systems   Unable to perform ROS: Acuity of condition      Physical Exam  Temp:  [36.9 °C (98.5 °F)] 36.9 °C (98.5 °F)  Pulse:  [74] 74  Resp:  [18] 18  BP: (139)/(64) 139/64  SpO2:  [99 %] 99 %    Physical Exam  Constitutional:       Appearance: She is ill-appearing.   HENT:      Head: Normocephalic.      Nose: Nose normal.      Mouth/Throat:      Mouth: Mucous membranes are dry.   Eyes:      Comments: Unable to assess, does not follow commands   Cardiovascular:      Rate and Rhythm: Normal rate.   Pulmonary:      Effort: Pulmonary effort is normal.   Abdominal:      Palpations: Abdomen is soft.   Musculoskeletal:      Right lower leg:  Edema present.      Left lower leg: Edema present.   Skin:     General: Skin is warm.   Neurological:      Mental Status: She is disoriented.      Comments: Does not follow commands   Psychiatric:      Comments: Calm       Fluids  No intake or output data in the 24 hours ending 12/19/22 1300    Laboratory                            Imaging  DX-CHEST-PORTABLE (1 VIEW)   Final Result         1.  Pulmonary edema and/or infiltrates   2.  Moderate left pleural effusion   3.  Cardiomegaly   4.  Atherosclerosis      CT-HEAD W/O   Final Result         1.  Left temporal parenchymal hemorrhage with surrounding vasogenic edema. Could represent hemorrhagic neoplastic lesion or parenchymal bleed.   2.  Left frontal, parietal, temporal, and occipital subarachnoid hemorrhages.   3.  Partial effacement left ventricle without significant midline shift appreciated.   4.  Atherosclerosis.      These findings were discussed with the patient's clinician, Asher Mendoza, on 12/16/2022 7:06 AM.           Assessment/Plan  * Intracranial hemorrhage (HCC)- (present on admission)  Assessment & Plan  CT head showed left temporal parenchymal hemorrhage with surrounding vasogenic edema (unclear if hemorrhagic neoplastic lesion or parenchymal bleed), left frontal, parietal, temporal and occipital subarachnoid hemorrhages.  Poor prognosis.  Transition to comfort care.  Hospice consulted.    ESRD (end stage renal disease) (HCC)- (present on admission)  Assessment & Plan  Comfort care measures only    Malignant neoplasm of body of uterus (HCC)- (present on admission)  Assessment & Plan  Stage IV uterine cancer.  Has been followed by Dr. Ames.  On comfort care       VTE prophylaxis: Comfort Care

## 2022-12-19 NOTE — HOSPICE
Met with  and son.   Family still has not decided on taking patient home or evaluation for GIP (in-patient hospice)      Patient is on Morphine 2 MG Q 3 hour routine.   Patient appears comfortable with current pain medication. Unsure how comfortable she would be if current pain control regimen would change. She opens her eyes to voice and goes right back to sleep.     Family asked for me to return after 1 PM for a decision on taking her home.     They notified me if patient is to discharge it would have to be tomorrow.     Dr. Baez and Capri MCCONNELL updated.     1300 Joint visit with Dr. Baez, Spoke to  Stephen.  He agreed for patient to discharge home tomorrow (Tuesday)  Consents signed, NO DME needed as she was previously on service with [facility]     Dodie updated to set up transportation.     Family would like robison catheter prior to discharge Dr. Quinones updated to place order.   KEVIN Sewell updated on plan.

## 2022-12-19 NOTE — DISCHARGE PLANNING
DC Transport Scheduled    Received request at: 12/19/2022 at 1329    Transport Company Scheduled:  [employer]    Scheduled Date: 12/20/2022  Scheduled Time: 1200     Destination: HOME AT [address] [place] [address] [place] [address]    Notified care team of scheduled transport via [employer].     If there are any changes needed to the DC transportation scheduled, please contact [facility] at ext. [phone] between the hours of [phone] Mon-Fri. If outside those hours, contact the ED Case Manager at ext. [phone].

## 2022-12-19 NOTE — CARE PLAN
The patient is Stable - Low risk of patient condition declining or worsening    Shift Goals  Clinical Goals: maintain skin integrity, comfort measures  Patient Goals: BRIJESH  Family Goals: comfort    Progress made toward(s) clinical / shift goals:    Problem: Fall Risk  Goal: Patient will remain free from falls  Outcome: Progressing     Problem: Knowledge Deficit - Comfort Care  Goal: Patient and family/care givers will demonstrate understanding of dying process and grieving  Outcome: Progressing     Problem: Psychosocial - Comfort Care  Goal: Privacy will be maintained for patient and family  Outcome: Progressing       Patient is not progressing towards the following goals:

## 2022-12-19 NOTE — DISCHARGE PLANNING
TCN following. HTH/SCP chart review completed.  Pt is now on comfort care ( orders in Epic). TCN will monitor if change in POC/status though will not actively be involved given comfort care status. Will defer to Hospital Case Management and Discharge Planning Team.Thank You

## 2022-12-19 NOTE — CARE PLAN
Problem: Skin Integrity  Goal: Skin integrity is maintained or improved  Outcome: Progressing     Problem: Fall Risk  Goal: Patient will remain free from falls  Outcome: Progressing     Problem: Knowledge Deficit - Comfort Care  Goal: Patient and family/care givers will demonstrate understanding of dying process and grieving  Outcome: Progressing     Problem: Psychosocial - Comfort Care  Goal: Spiritual and cultural needs incorporated into hospitalization  Outcome: Progressing     Problem: Respiratory - Comfort Care  Goal: Patient's respiratory function will be supported  Outcome: Progressing   The patient is Watcher - Medium risk of patient condition declining or worsening    Shift Goals  Clinical Goals: maintain skin integrity, comfort measures  Patient Goals: BRIJESH  Family Goals: comfort    Progress made toward(s) clinical / shift goals: comfort care    Patient is not progressing towards the following goals:none

## 2022-12-20 NOTE — PROGRESS NOTES
Pt admitted to community hospice at this time. These are repeat orders as previous orders were discontinued with hospital DC

## 2022-12-20 NOTE — PROGRESS NOTES
Patient admitted to home hospice with primary diagnosis of ESRD, uterine cancer IV. Patient is transitioning. Patient taking MS IV 4mg Q 3 hours routine. Patient continues with terminal agitation. Order for oxy liquid 15mg Q 4 hours scheduled and 1 hour PRN, lorazepam 1mg Q 1 hour PRN.

## 2022-12-20 NOTE — CARE PLAN
The patient is Stable - Low risk of patient condition declining or worsening    Shift Goals  Clinical Goals: comfort, skin integrity  Patient Goals: BRIJESH  Family Goals: comfort    Progress made toward(s) clinical / shift goals:    Problem: Fall Risk  Goal: Patient will remain free from falls  Outcome: Progressing     Problem: Skin Integrity  Goal: Skin integrity is maintained or improved  Outcome: Progressing     Problem: Knowledge Deficit - Comfort Care  Goal: Patient and family/care givers will demonstrate understanding of dying process and grieving  Outcome: Progressing       Patient is not progressing towards the following goals:

## 2022-12-20 NOTE — PROGRESS NOTES
Patient IV removed with tip intact after morning rounds. Patient will be going home with robison. Patient is all ready to go home on comfort care.  Patients spouse and son at bedside. Discharge paperwork given to family. Transportation to patients place of residence scheduled for 1200.

## 2022-12-20 NOTE — CARE PLAN
The patient is Unstable - High likelihood or risk of patient condition declining or worsening    Shift Goals  Clinical Goals: comfort, skin integrity  Patient Goals: BRIJESH  Family Goals: comfort    Progress made toward(s) clinical / shift goals:    Problem: Fall Risk  Goal: Patient will remain free from falls  Outcome: Progressing     Problem: Skin Integrity  Goal: Skin integrity is maintained or improved  Outcome: Progressing     Problem: Knowledge Deficit - Comfort Care  Goal: Patient and family/care givers will demonstrate understanding of dying process and grieving  Outcome: Progressing     Problem: Psychosocial - Comfort Care  Goal: Spiritual and cultural needs incorporated into hospitalization  Outcome: Progressing  Goal: Patient's level of anxiety will decrease  Outcome: Progressing  Goal: Patient and family will demonstrate ability to cope with life altering diagnosis and/or procedure  Outcome: Progressing  Goal: Privacy will be maintained for patient and family  Outcome: Progressing     Problem: Discharge Planning - Comfort Care  Goal: Patient's continuum of care needs are met  Outcome: Progressing     Problem: Respiratory - Comfort Care  Goal: Patient's respiratory function will be supported  Outcome: Progressing       Patient is not progressing towards the following goals:

## 2022-12-20 NOTE — DISCHARGE SUMMARY
Discharge Summary    CHIEF COMPLAINT ON ADMISSION  Chief Complaint   Patient presents with    ALOC       Reason for Admission  Intracranial hemorrhage (HCC)     Admission Date  12/16/2022    CODE STATUS  Comfort Care/DNR    HPI & HOSPITAL COURSE  Aimee Mauro is a 77 y.o. female admitted 12/16/2022 with altered level of consciousness after a fall with seizure-like activity.  She has stage IV uterine cancer, ESRD and is undergoing outpatient dialysis. She has previously been on hospice.    CT head showed left temporal parenchymal hemorrhage with surrounding vasogenic edema (unclear if hemorrhagic neoplastic lesion or parenchymal bleed), left frontal, parietal, temporal and occipital subarachnoid hemorrhages.     After an extensive discussion with patient's , son and daughter-in-law, patient was transitioned to comfort care and hospice was reconsulted.    Will discharge home with home hospice today.      Therefore, she is discharged in guarded and stable condition to hospice.    Discharge Date  12/20/2022    FOLLOW UP ITEMS POST DISCHARGE  Home Hospice    DISCHARGE DIAGNOSES  Principal Problem:    Intracranial hemorrhage (HCC) POA: Yes  Active Problems:    ESRD (end stage renal disease) (HCC) POA: Yes      Overview: Patient is follow Dr. Urbina and on dialysis    Malignant neoplasm of body of uterus (HCC) POA: Yes      Overview: Patient is following with Dr. Ames. She was supposed to have surgery       however she decided not to move forward. Hospice was offered but she would       like to continue to dialysis. She has already established that she would       like to be DNR, this is reflected on the chart. She denies any pain       currently  Resolved Problems:    * No resolved hospital problems. *      FOLLOW UP  Future Appointments   Date Time Provider Department Center   12/20/2022 To Be Determined Mike Echols R.N. [facility] None   1/13/2023  2:00 PM Amy Stevenson M.D. [facility] None     No follow-up provider  specified.    MEDICATIONS ON DISCHARGE     Medication List        STOP taking these medications      acetaminophen 650 MG Supp  Commonly known as: TYLENOL     Acetaminophen Extra Strength 500 MG Tabs  Generic drug: acetaminophen     bisacodyl 10 MG Supp  Commonly known as: DULCOLAX     calcium acetate 667 MG Tabs tablet  Commonly known as: PHOS-LO     carvedilol 3.125 MG Tabs  Commonly known as: COREG     furosemide 80 MG Tabs  Commonly known as: LASIX     LORazepam 2 MG/ML Conc  Commonly known as: ATIVAN     ondansetron 4 MG Tbdp  Commonly known as: ZOFRAN ODT     oxyCODONE 20 MG/ML Conc     Stool Softener Plus Laxative 8.6-50 MG Tabs  Generic drug: senna-docusate     traZODone 100 MG Tabs  Commonly known as: DESYREL              Allergies  No Known Allergies    DIET  No orders of the defined types were placed in this encounter.      ACTIVITY  As tolerated.      CONSULTATIONS  Hospice    PROCEDURES  None    LABORATORY  Lab Results   Component Value Date    SODIUM 126 (L) 12/16/2022    POTASSIUM 5.2 12/16/2022    CHLORIDE 91 (L) 12/16/2022    CO2 23 12/16/2022    GLUCOSE 123 (H) 12/16/2022    BUN 19 12/16/2022    CREATININE 4.16 (H) 12/16/2022        Lab Results   Component Value Date    WBC 11.0 (H) 12/16/2022    HEMOGLOBIN 11.1 (L) 12/16/2022    HEMATOCRIT 32.7 (L) 12/16/2022    PLATELETCT 156 (L) 12/16/2022        Total time of the discharge process exceeds 20 minutes.

## 2023-01-13 ENCOUNTER — APPOINTMENT (OUTPATIENT)
Dept: SLEEP MEDICINE | Facility: MEDICAL CENTER | Age: 78
End: 2023-01-13
Payer: MEDICARE

## (undated) DEVICE — TOWEL STOP TIMEOUT SAFETY FLAG (40EA/CA)

## (undated) DEVICE — PAD OR TABLE DA VINCI 2IN X 20IN X 72IN - (12EA/CA)

## (undated) DEVICE — SUTURE QUILL 0 PDO 14X14 - (12/BX)

## (undated) DEVICE — PACK TRENGUARD 450 PROCEDURE (12EA/CA)

## (undated) DEVICE — SET EXTENSION WITH 2 PORTS (48EA/CA) ***PART #2C8610 IS A SUBSTITUTE*****

## (undated) DEVICE — SET SUCTION/IRRIGATION WITH DISPOSABLE TIP (6/CA )PART #0250-070-520 IS A SUB

## (undated) DEVICE — GLOVE SZ 6.5 BIOGEL PI MICRO - PF LF (50PR/BX)

## (undated) DEVICE — GLOVE BIOGEL PI ORTHO SZ 7.5 PF LF (40PR/BX)

## (undated) DEVICE — SUTURE GENERAL

## (undated) DEVICE — SUTURE 3-0 MONOCRYL PLUS PS-1 - 27 INCH (36/BX)

## (undated) DEVICE — DRESSING NON-ADHERING 8 X 3 - (50/BX)

## (undated) DEVICE — SEAL 5MM-8MM UNIVERSAL  BOX OF 10

## (undated) DEVICE — NEEDLE DRIVER MEGA SUTURECUT DA VINCI 15X'S REUSABLE

## (undated) DEVICE — DRAPE ARM  BOX OF 20

## (undated) DEVICE — COVER FOOT UNIVERSAL DISP. - (25EA/CA)

## (undated) DEVICE — BIPOLAR FORCE DA VINCI 12X'S REISABLE

## (undated) DEVICE — SPATULA PERMANENT CAUTERY DA VINCI 10X'S REUSABLE

## (undated) DEVICE — SET TUBING PNEUMOCLEAR HIGH FLOW SMOKE EVACUATION (10EA/BX)

## (undated) DEVICE — DRAPE COLUMN  BOX OF 20

## (undated) DEVICE — GLOVE COTTON STERILE (50/CA)

## (undated) DEVICE — SENSOR OXIMETER ADULT SPO2 RD SET (20EA/BX)

## (undated) DEVICE — GOWN SURGEONS X-LARGE - DISP. (30/CA)

## (undated) DEVICE — PACK GYN DAVINCI (2EA/CA)

## (undated) DEVICE — SET LEADWIRE 5 LEAD BEDSIDE DISPOSABLE ECG (1SET OF 5/EA)

## (undated) DEVICE — FORCEPS PROGRASP DA VINCI 10X'S REUSABLE

## (undated) DEVICE — CANISTER SUCTION 3000ML MECHANICAL FILTER AUTO SHUTOFF MEDI-VAC NONSTERILE LF DISP  (40EA/CA)

## (undated) DEVICE — NEEDLE INSUFFLATION FOR STEP - (12/BX)

## (undated) DEVICE — GOWN WARMING STANDARD FLEX - (30/CA)

## (undated) DEVICE — SLEEVE VASO CALF MED - (10PR/CA)

## (undated) DEVICE — ARMREST CRADLE FOAM - (2PR/PK 12PR/CA)

## (undated) DEVICE — GLOVE BIOGEL PI INDICATOR SZ 6.5 SURGICAL PF LF - (50/BX 4BX/CA)

## (undated) DEVICE — ELECTRODE DUAL RETURN W/ CORD - (50/PK)

## (undated) DEVICE — SYRINGE ASEPTO - (50EA/CA

## (undated) DEVICE — SUCTION INSTRUMENT YANKAUER BULBOUS TIP W/O VENT (50EA/CA)

## (undated) DEVICE — TUBING CLEARLINK DUO-VENT - C-FLO (48EA/CA)

## (undated) DEVICE — LACTATED RINGERS INJ 1000 ML - (14EA/CA 60CA/PF)